# Patient Record
Sex: FEMALE | Race: BLACK OR AFRICAN AMERICAN | NOT HISPANIC OR LATINO | Employment: FULL TIME | URBAN - METROPOLITAN AREA
[De-identification: names, ages, dates, MRNs, and addresses within clinical notes are randomized per-mention and may not be internally consistent; named-entity substitution may affect disease eponyms.]

---

## 2015-07-15 LAB — HPV MRNA E6/E7: NOT DETECTED

## 2020-06-24 ENCOUNTER — LAB VISIT (OUTPATIENT)
Dept: LAB | Facility: OTHER | Age: 45
End: 2020-06-24
Payer: COMMERCIAL

## 2020-06-24 DIAGNOSIS — Z20.822 SUSPECTED COVID-19 VIRUS INFECTION: ICD-10-CM

## 2020-06-24 PROCEDURE — U0003 INFECTIOUS AGENT DETECTION BY NUCLEIC ACID (DNA OR RNA); SEVERE ACUTE RESPIRATORY SYNDROME CORONAVIRUS 2 (SARS-COV-2) (CORONAVIRUS DISEASE [COVID-19]), AMPLIFIED PROBE TECHNIQUE, MAKING USE OF HIGH THROUGHPUT TECHNOLOGIES AS DESCRIBED BY CMS-2020-01-R: HCPCS

## 2020-06-27 LAB — SARS-COV-2 RNA RESP QL NAA+PROBE: NOT DETECTED

## 2020-07-01 ENCOUNTER — LAB VISIT (OUTPATIENT)
Dept: LAB | Facility: OTHER | Age: 45
End: 2020-07-01
Attending: INTERNAL MEDICINE
Payer: COMMERCIAL

## 2020-07-01 DIAGNOSIS — Z20.822 SUSPECTED COVID-19 VIRUS INFECTION: ICD-10-CM

## 2020-07-01 PROCEDURE — U0003 INFECTIOUS AGENT DETECTION BY NUCLEIC ACID (DNA OR RNA); SEVERE ACUTE RESPIRATORY SYNDROME CORONAVIRUS 2 (SARS-COV-2) (CORONAVIRUS DISEASE [COVID-19]), AMPLIFIED PROBE TECHNIQUE, MAKING USE OF HIGH THROUGHPUT TECHNOLOGIES AS DESCRIBED BY CMS-2020-01-R: HCPCS

## 2020-07-05 LAB — SARS-COV-2 RNA RESP QL NAA+PROBE: NOT DETECTED

## 2020-07-08 ENCOUNTER — LAB VISIT (OUTPATIENT)
Dept: LAB | Facility: OTHER | Age: 45
End: 2020-07-08
Payer: COMMERCIAL

## 2020-07-08 DIAGNOSIS — Z20.822 SUSPECTED COVID-19 VIRUS INFECTION: ICD-10-CM

## 2020-07-08 PROCEDURE — U0003 INFECTIOUS AGENT DETECTION BY NUCLEIC ACID (DNA OR RNA); SEVERE ACUTE RESPIRATORY SYNDROME CORONAVIRUS 2 (SARS-COV-2) (CORONAVIRUS DISEASE [COVID-19]), AMPLIFIED PROBE TECHNIQUE, MAKING USE OF HIGH THROUGHPUT TECHNOLOGIES AS DESCRIBED BY CMS-2020-01-R: HCPCS | Mod: ST72

## 2020-07-12 LAB — SARS-COV-2 RNA RESP QL NAA+PROBE: NEGATIVE

## 2020-07-15 ENCOUNTER — LAB VISIT (OUTPATIENT)
Dept: LAB | Facility: OTHER | Age: 45
End: 2020-07-15
Payer: COMMERCIAL

## 2020-07-15 DIAGNOSIS — Z03.818 ENCOUNTER FOR OBSERVATION FOR SUSPECTED EXPOSURE TO OTHER BIOLOGICAL AGENTS RULED OUT: ICD-10-CM

## 2020-07-15 DIAGNOSIS — Z20.822 SUSPECTED COVID-19 VIRUS INFECTION: ICD-10-CM

## 2020-07-15 PROCEDURE — U0003 INFECTIOUS AGENT DETECTION BY NUCLEIC ACID (DNA OR RNA); SEVERE ACUTE RESPIRATORY SYNDROME CORONAVIRUS 2 (SARS-COV-2) (CORONAVIRUS DISEASE [COVID-19]), AMPLIFIED PROBE TECHNIQUE, MAKING USE OF HIGH THROUGHPUT TECHNOLOGIES AS DESCRIBED BY CMS-2020-01-R: HCPCS

## 2020-07-19 LAB — SARS-COV-2 RNA RESP QL NAA+PROBE: NEGATIVE

## 2020-07-22 ENCOUNTER — LAB VISIT (OUTPATIENT)
Dept: LAB | Facility: OTHER | Age: 45
End: 2020-07-22
Payer: COMMERCIAL

## 2020-07-22 DIAGNOSIS — Z20.822 SUSPECTED COVID-19 VIRUS INFECTION: ICD-10-CM

## 2020-07-22 DIAGNOSIS — Z03.818 ENCOUNTER FOR OBSERVATION FOR SUSPECTED EXPOSURE TO OTHER BIOLOGICAL AGENTS RULED OUT: ICD-10-CM

## 2020-07-22 PROCEDURE — U0003 INFECTIOUS AGENT DETECTION BY NUCLEIC ACID (DNA OR RNA); SEVERE ACUTE RESPIRATORY SYNDROME CORONAVIRUS 2 (SARS-COV-2) (CORONAVIRUS DISEASE [COVID-19]), AMPLIFIED PROBE TECHNIQUE, MAKING USE OF HIGH THROUGHPUT TECHNOLOGIES AS DESCRIBED BY CMS-2020-01-R: HCPCS

## 2020-07-22 RX ORDER — OMEPRAZOLE 20 MG/1
TABLET, DELAYED RELEASE ORAL
COMMUNITY
Start: 2019-01-01 | End: 2020-07-27

## 2020-07-22 NOTE — PROGRESS NOTES
[facility] Note    Chief Complaint      Chief Complaint   Patient presents with    [facility]    Annual Exam       History of Present Illness      Ilene Garber is a 44 y.o. AAF with Dysmnenorrhea, RT Rotator cuff tear presents to est PCP and for well visit.    Dysmenorrhea - Takes Ibuprofen prn abd cramping.     RT shoulder pain - She was injured at work. She tore her rotator cuff. Takes Ibuprofen prn.     Mild intermittent Asthma - Allergies are a trigger.  Only needs Proair 5 times/yr.    Obesity - BMI - 30.3 - Rec exercise every other day. Rec low carb diet.    HCM - Flu - ;  Tdap - ?;  PCV 13 - ?;  PVX 23 - ?;  Shingrix - due at 52 y.o.;  MGM - due; gets from OB;  PAP -  - wnl per pt;  HIV Screen - utd - neg per pt; C-scope - none - due at 52 y.o.; Prev PCP - Dr. Rory Cooper; Ob/GYN - Dr. Dey    Past Medical History:  Past Medical History:   Diagnosis Date    Asthma     GERD (gastroesophageal reflux disease)     Hypertension     Ovarian cyst     Rotator cuff tear, right        Past Surgical History:   has a past surgical history that includes  section; LAPAROSCOPY LYSIS OF ADHESIONS (2018); and Bilateral tubal ligation.    Family History:  family history includes COPD in her mother; Coronary artery disease in her mother; Diabetes in her maternal grandmother; Hypertension in her maternal grandmother.     Social History:  Social History     Tobacco Use    Smoking status: Never Smoker    Smokeless tobacco: Never Used   Substance Use Topics    Alcohol use: Yes     Drinks per session: 1 or 2     Comment: on rare occasion    Drug use: Never       Review of Systems   Constitutional: Negative for chills, diaphoresis and fever.   HENT: Positive for sore throat. Negative for hearing loss and tinnitus.    Eyes: Negative for blurred vision and double vision.   Respiratory: Negative for cough and shortness of breath.    Cardiovascular: Negative for chest pain and  palpitations.   Gastrointestinal: Positive for heartburn. Negative for abdominal pain, blood in stool, constipation, diarrhea, nausea and vomiting.   Genitourinary: Negative for dysuria and frequency.   Musculoskeletal: Positive for joint pain.        RT shoulder   Skin: Negative for itching and rash.   Neurological: Positive for headaches. Negative for dizziness.   Endo/Heme/Allergies: Bruises/bleeds easily.   Psychiatric/Behavioral: Negative for depression. The patient is not nervous/anxious.         Medications:  Outpatient Encounter Medications as of 7/23/2020   Medication Sig Dispense Refill    albuterol (PROVENTIL) 5 mg/mL nebulizer solution Inhale 2 puffs into the lungs.      fluticasone propionate (FLONASE) 50 mcg/actuation nasal spray 1 spray by Nasal route.      hydroCHLOROthiazide (HYDRODIURIL) 25 MG tablet Take 1 tablet (25 mg total) by mouth once daily. 90 tablet 3    ibuprofen (ADVIL,MOTRIN) 800 MG tablet Take 800 mg by mouth.      omeprazole (PRILOSEC OTC) 20 MG tablet       [DISCONTINUED] hydroCHLOROthiazide (HYDRODIURIL) 25 MG tablet TK 1 T PO D      [DISCONTINUED] ibuprofen (ADVIL,MOTRIN) 200 MG tablet Take 800 mg by mouth.      albuterol (PROAIR HFA) 90 mcg/actuation inhaler Inhale 2 puffs into the lungs every 6 (six) hours as needed for Wheezing. Rescue 18 g 3     No facility-administered encounter medications on file as of 7/23/2020.        Current Outpatient Medications:     albuterol (PROVENTIL) 5 mg/mL nebulizer solution, Inhale 2 puffs into the lungs., Disp: , Rfl:     fluticasone propionate (FLONASE) 50 mcg/actuation nasal spray, 1 spray by Nasal route., Disp: , Rfl:     hydroCHLOROthiazide (HYDRODIURIL) 25 MG tablet, Take 1 tablet (25 mg total) by mouth once daily., Disp: 90 tablet, Rfl: 3    ibuprofen (ADVIL,MOTRIN) 800 MG tablet, Take 800 mg by mouth., Disp: , Rfl:     omeprazole (PRILOSEC OTC) 20 MG tablet, , Disp: , Rfl:     albuterol (PROAIR HFA) 90 mcg/actuation  "inhaler, Inhale 2 puffs into the lungs every 6 (six) hours as needed for Wheezing. Rescue, Disp: 18 g, Rfl: 3    Allergies:  Review of patient's allergies indicates:  No Known Allergies    Health Maintenance:    There is no immunization history on file for this patient.   Health Maintenance   Topic Date Due    Hepatitis C Screening  1975    Lipid Panel  1975    TETANUS VACCINE  08/22/1995    Mammogram  08/22/2017      Objective:      Vital Signs  Temp: 97.8 °F (36.6 °C)  Pulse: 71  Resp: 19  SpO2: 99 %  BP: 123/74  BP Location: Right arm  Patient Position: Sitting  Pain Score: 0-No pain  Height and Weight  Height: 5' 7" (170.2 cm)  Weight: 87.9 kg (193 lb 12.6 oz)  BSA (Calculated - sq m): 2.04 sq meters  BMI (Calculated): 30.3  Weight in (lb) to have BMI = 25: 159.3]    Laboratory:    Physical Exam  Vitals signs reviewed.   Constitutional:       Appearance: Normal appearance.   HENT:      Head: Normocephalic and atraumatic.      Right Ear: Tympanic membrane normal.      Left Ear: Tympanic membrane normal.   Eyes:      Extraocular Movements: Extraocular movements intact.      Conjunctiva/sclera: Conjunctivae normal.      Pupils: Pupils are equal, round, and reactive to light.   Neck:      Musculoskeletal: Normal range of motion and neck supple.   Cardiovascular:      Rate and Rhythm: Normal rate and regular rhythm.      Pulses: Normal pulses.      Heart sounds: Normal heart sounds.   Pulmonary:      Effort: Pulmonary effort is normal. No respiratory distress.      Breath sounds: Normal breath sounds. No wheezing or rhonchi.   Abdominal:      General: Bowel sounds are normal. There is no distension.      Palpations: Abdomen is soft.      Tenderness: There is no abdominal tenderness. There is no guarding or rebound.   Musculoskeletal:      Comments: Dec ROM of RT shoulder   Skin:     General: Skin is warm and dry.   Neurological:      General: No focal deficit present.      Mental Status: She is alert " and oriented to person, place, and time.   Psychiatric:         Mood and Affect: Mood normal.         Behavior: Behavior normal.             Assessment:       1. Normal physical exam, routine    2. Screening for lipoid disorders    3. Hypertension, unspecified type    4. Gastroesophageal reflux disease, esophagitis presence not specified    5. Mild intermittent asthma without complication    6. Class 1 obesity due to excess calories with serious comorbidity and body mass index (BMI) of 30.0 to 30.9 in adult        Ilene Garber is a 44 y.o.female with:    1. Normal physical exam, routine  - CBC auto differential; Future  - Comprehensive metabolic panel; Future  - T4, free; Future  - TSH; Future  - Performed today.  Will check Basic labs.  RTC in 1 yr for fu or sooner if needed. Will obtain old records for review    2. Screening for lipoid disorders  - Lipid Panel; Future    3. Hypertension, unspecified type  - hydroCHLOROthiazide (HYDRODIURIL) 25 MG tablet; Take 1 tablet (25 mg total) by mouth once daily.  Dispense: 90 tablet; Refill: 3  - Stable.  Cont current regimen.    4. Gastroesophageal reflux disease, esophagitis presence not specified  - Stable.  Cont current regimen.    5. Mild intermittent asthma without complication  - albuterol (PROAIR HFA) 90 mcg/actuation inhaler; Inhale 2 puffs into the lungs every 6 (six) hours as needed for Wheezing. Rescue  Dispense: 18 g; Refill: 3  - Stable.  Cont current regimen.    6. Class 1 obesity due to excess calories with serious comorbidity and body mass index (BMI) of 30.0 to 30.9 in adult  -Rec exercise to goal of 150 min/wk and low carb diet.     Chronic conditions status updated as per HPI.  Other than changes above, cont current medications and maintain follow up with specialists.  Return to clinic in 12 months for well visit or sooner if needed.      Uma Henry MD  [facility]

## 2020-07-23 ENCOUNTER — OFFICE VISIT (OUTPATIENT)
Dept: PRIMARY CARE CLINIC | Facility: CLINIC | Age: 45
End: 2020-07-23
Payer: COMMERCIAL

## 2020-07-23 VITALS
DIASTOLIC BLOOD PRESSURE: 74 MMHG | OXYGEN SATURATION: 99 % | SYSTOLIC BLOOD PRESSURE: 123 MMHG | RESPIRATION RATE: 19 BRPM | TEMPERATURE: 98 F | WEIGHT: 193.81 LBS | BODY MASS INDEX: 30.42 KG/M2 | HEIGHT: 67 IN | HEART RATE: 71 BPM

## 2020-07-23 DIAGNOSIS — K21.9 GASTROESOPHAGEAL REFLUX DISEASE, ESOPHAGITIS PRESENCE NOT SPECIFIED: ICD-10-CM

## 2020-07-23 DIAGNOSIS — E66.09 CLASS 1 OBESITY DUE TO EXCESS CALORIES WITH SERIOUS COMORBIDITY AND BODY MASS INDEX (BMI) OF 30.0 TO 30.9 IN ADULT: ICD-10-CM

## 2020-07-23 DIAGNOSIS — Z13.220 SCREENING FOR LIPOID DISORDERS: ICD-10-CM

## 2020-07-23 DIAGNOSIS — J45.20 MILD INTERMITTENT ASTHMA WITHOUT COMPLICATION: ICD-10-CM

## 2020-07-23 DIAGNOSIS — Z00.00 NORMAL PHYSICAL EXAM, ROUTINE: Primary | ICD-10-CM

## 2020-07-23 DIAGNOSIS — I10 HYPERTENSION, UNSPECIFIED TYPE: ICD-10-CM

## 2020-07-23 PROBLEM — E66.811 CLASS 1 OBESITY DUE TO EXCESS CALORIES WITH SERIOUS COMORBIDITY AND BODY MASS INDEX (BMI) OF 30.0 TO 30.9 IN ADULT: Status: ACTIVE | Noted: 2020-07-23

## 2020-07-23 PROCEDURE — 99999 PR PBB SHADOW E&M-EST. PATIENT-LVL IV: CPT | Mod: PBBFAC,,, | Performed by: INTERNAL MEDICINE

## 2020-07-23 PROCEDURE — 3008F BODY MASS INDEX DOCD: CPT | Mod: CPTII,S$GLB,, | Performed by: INTERNAL MEDICINE

## 2020-07-23 PROCEDURE — 99999 PR PBB SHADOW E&M-EST. PATIENT-LVL IV: ICD-10-PCS | Mod: PBBFAC,,, | Performed by: INTERNAL MEDICINE

## 2020-07-23 PROCEDURE — 99386 PREV VISIT NEW AGE 40-64: CPT | Mod: S$GLB,,, | Performed by: INTERNAL MEDICINE

## 2020-07-23 PROCEDURE — 3008F PR BODY MASS INDEX (BMI) DOCUMENTED: ICD-10-PCS | Mod: CPTII,S$GLB,, | Performed by: INTERNAL MEDICINE

## 2020-07-23 PROCEDURE — 99386 PR PREVENTIVE VISIT,NEW,40-64: ICD-10-PCS | Mod: S$GLB,,, | Performed by: INTERNAL MEDICINE

## 2020-07-23 RX ORDER — FLUTICASONE PROPIONATE 50 MCG
1 SPRAY, SUSPENSION (ML) NASAL
COMMUNITY
End: 2021-11-17 | Stop reason: SDUPTHER

## 2020-07-23 RX ORDER — IBUPROFEN 200 MG
800 TABLET ORAL
COMMUNITY
End: 2020-07-23

## 2020-07-23 RX ORDER — HYDROCHLOROTHIAZIDE 25 MG/1
TABLET ORAL
COMMUNITY
Start: 2020-05-22 | End: 2020-07-23 | Stop reason: SDUPTHER

## 2020-07-23 RX ORDER — HYDROCHLOROTHIAZIDE 25 MG/1
25 TABLET ORAL DAILY
Qty: 90 TABLET | Refills: 3 | Status: SHIPPED | OUTPATIENT
Start: 2020-07-23 | End: 2020-08-24

## 2020-07-23 RX ORDER — ALBUTEROL SULFATE 5 MG/ML
2 SOLUTION RESPIRATORY (INHALATION) EVERY 4 HOURS PRN
COMMUNITY

## 2020-07-23 RX ORDER — ALBUTEROL SULFATE 90 UG/1
2 AEROSOL, METERED RESPIRATORY (INHALATION) EVERY 6 HOURS PRN
Qty: 18 G | Refills: 3 | Status: SHIPPED | OUTPATIENT
Start: 2020-07-23 | End: 2021-07-23

## 2020-07-23 RX ORDER — IBUPROFEN 800 MG/1
800 TABLET ORAL
COMMUNITY
Start: 2018-11-28 | End: 2021-11-05

## 2020-07-24 ENCOUNTER — LAB VISIT (OUTPATIENT)
Dept: LAB | Facility: HOSPITAL | Age: 45
End: 2020-07-24
Attending: INTERNAL MEDICINE
Payer: COMMERCIAL

## 2020-07-24 ENCOUNTER — PATIENT OUTREACH (OUTPATIENT)
Dept: ADMINISTRATIVE | Facility: HOSPITAL | Age: 45
End: 2020-07-24

## 2020-07-24 DIAGNOSIS — Z00.00 NORMAL PHYSICAL EXAM, ROUTINE: ICD-10-CM

## 2020-07-24 DIAGNOSIS — Z13.220 SCREENING FOR LIPOID DISORDERS: ICD-10-CM

## 2020-07-24 LAB
ALBUMIN SERPL BCP-MCNC: 3.8 G/DL (ref 3.5–5.2)
ALP SERPL-CCNC: 55 U/L (ref 55–135)
ALT SERPL W/O P-5'-P-CCNC: 11 U/L (ref 10–44)
ANION GAP SERPL CALC-SCNC: 7 MMOL/L (ref 8–16)
AST SERPL-CCNC: 16 U/L (ref 10–40)
BASOPHILS # BLD AUTO: 0.05 K/UL (ref 0–0.2)
BASOPHILS NFR BLD: 0.9 % (ref 0–1.9)
BILIRUB SERPL-MCNC: 0.4 MG/DL (ref 0.1–1)
BUN SERPL-MCNC: 8 MG/DL (ref 6–20)
CALCIUM SERPL-MCNC: 8.9 MG/DL (ref 8.7–10.5)
CHLORIDE SERPL-SCNC: 107 MMOL/L (ref 95–110)
CHOLEST SERPL-MCNC: 137 MG/DL (ref 120–199)
CHOLEST/HDLC SERPL: 3.5 {RATIO} (ref 2–5)
CO2 SERPL-SCNC: 26 MMOL/L (ref 23–29)
CREAT SERPL-MCNC: 0.9 MG/DL (ref 0.5–1.4)
DIFFERENTIAL METHOD: ABNORMAL
EOSINOPHIL # BLD AUTO: 0.1 K/UL (ref 0–0.5)
EOSINOPHIL NFR BLD: 1.2 % (ref 0–8)
ERYTHROCYTE [DISTWIDTH] IN BLOOD BY AUTOMATED COUNT: 14.6 % (ref 11.5–14.5)
EST. GFR  (AFRICAN AMERICAN): >60 ML/MIN/1.73 M^2
EST. GFR  (NON AFRICAN AMERICAN): >60 ML/MIN/1.73 M^2
GLUCOSE SERPL-MCNC: 86 MG/DL (ref 70–110)
HCT VFR BLD AUTO: 38.8 % (ref 37–48.5)
HDLC SERPL-MCNC: 39 MG/DL (ref 40–75)
HDLC SERPL: 28.5 % (ref 20–50)
HGB BLD-MCNC: 11.7 G/DL (ref 12–16)
IMM GRANULOCYTES # BLD AUTO: 0.01 K/UL (ref 0–0.04)
IMM GRANULOCYTES NFR BLD AUTO: 0.2 % (ref 0–0.5)
LDLC SERPL CALC-MCNC: 81.6 MG/DL (ref 63–159)
LYMPHOCYTES # BLD AUTO: 2.2 K/UL (ref 1–4.8)
LYMPHOCYTES NFR BLD: 39.2 % (ref 18–48)
MCH RBC QN AUTO: 27.8 PG (ref 27–31)
MCHC RBC AUTO-ENTMCNC: 30.2 G/DL (ref 32–36)
MCV RBC AUTO: 92 FL (ref 82–98)
MONOCYTES # BLD AUTO: 0.4 K/UL (ref 0.3–1)
MONOCYTES NFR BLD: 7.7 % (ref 4–15)
NEUTROPHILS # BLD AUTO: 2.9 K/UL (ref 1.8–7.7)
NEUTROPHILS NFR BLD: 50.8 % (ref 38–73)
NONHDLC SERPL-MCNC: 98 MG/DL
NRBC BLD-RTO: 0 /100 WBC
PLATELET # BLD AUTO: 282 K/UL (ref 150–350)
PMV BLD AUTO: 11.6 FL (ref 9.2–12.9)
POTASSIUM SERPL-SCNC: 3.4 MMOL/L (ref 3.5–5.1)
PROT SERPL-MCNC: 7.3 G/DL (ref 6–8.4)
RBC # BLD AUTO: 4.21 M/UL (ref 4–5.4)
SODIUM SERPL-SCNC: 140 MMOL/L (ref 136–145)
T4 FREE SERPL-MCNC: 1.06 NG/DL (ref 0.71–1.51)
TRIGL SERPL-MCNC: 82 MG/DL (ref 30–150)
TSH SERPL DL<=0.005 MIU/L-ACNC: 1.24 UIU/ML (ref 0.4–4)
WBC # BLD AUTO: 5.71 K/UL (ref 3.9–12.7)

## 2020-07-24 PROCEDURE — 85025 COMPLETE CBC W/AUTO DIFF WBC: CPT

## 2020-07-24 PROCEDURE — 84439 ASSAY OF FREE THYROXINE: CPT

## 2020-07-24 PROCEDURE — 84443 ASSAY THYROID STIM HORMONE: CPT

## 2020-07-24 PROCEDURE — 80053 COMPREHEN METABOLIC PANEL: CPT

## 2020-07-24 PROCEDURE — 80061 LIPID PANEL: CPT

## 2020-07-24 PROCEDURE — 36415 COLL VENOUS BLD VENIPUNCTURE: CPT | Mod: PN

## 2020-07-24 NOTE — LETTER
AUTHORIZATION FOR RELEASE OF   CONFIDENTIAL INFORMATION    Dear Dr. Dey,    We are seeing Ilene Garber, date of birth 1975, in the clinic at [facility]. Uma Henry MD is the patient's PCP. Ilene Garber has an outstanding lab/procedure at the time we reviewed her chart. In order to help keep her health information updated, she has authorized us to request the following medical record(s):        ( X )  MAMMOGRAM                                   (  )  COLONOSCOPY      ( X )  PAP SMEAR                                       (  )  OUTSIDE LAB RESULTS     (  )  DEXA SCAN                                          (  )  EYE EXAM            (  )  FOOT EXAM                                          (  )  ENTIRE RECORD     (  )  OUTSIDE IMMUNIZATIONS                 (  )  _______________         Please fax records to [facility], Nicole Giambrone, MD, [phone]     If you have any questions, please contact Ayala at [phone].           Patient Name: Ilene Garber  : 1975  Patient Phone #: [phone]

## 2020-07-27 ENCOUNTER — TELEPHONE (OUTPATIENT)
Dept: PRIMARY CARE CLINIC | Facility: CLINIC | Age: 45
End: 2020-07-27

## 2020-07-27 DIAGNOSIS — E87.6 HYPOKALEMIA: Primary | ICD-10-CM

## 2020-07-27 DIAGNOSIS — K21.9 GASTROESOPHAGEAL REFLUX DISEASE, ESOPHAGITIS PRESENCE NOT SPECIFIED: ICD-10-CM

## 2020-07-27 LAB — SARS-COV-2 RNA RESP QL NAA+PROBE: NEGATIVE

## 2020-07-27 RX ORDER — PANTOPRAZOLE SODIUM 40 MG/1
40 TABLET, DELAYED RELEASE ORAL DAILY
Qty: 30 TABLET | Refills: 11 | Status: SHIPPED | OUTPATIENT
Start: 2020-07-27 | End: 2021-08-16

## 2020-07-27 NOTE — PROGRESS NOTES
I reviewed pt's labs. Kevin inform pt -  Her Thyroid functions are normal.  Her Cholesterol looks ok but her HDL (good Cholesterol) is low.  I recommend exercise to increase this. Her kidney and liver functions look good.  Her potassium is just below normal at 3.4.  I rec she follow a high potassium diet (ie add bananas to diet).  Can repeat in 4 wks.  Delvin eput in order for me to sign. Her blood counts are low normal - is she having heavy cycles?    Dr. CONTRERAS

## 2020-07-27 NOTE — TELEPHONE ENCOUNTER
I went over lab results with pt. She expressed understanding. Pt reports heavy cycles. Please sign pended orders so I can link them to her appt.    Pt c/o Gerd. She is taking Prilosec 40mg  but not helping. She would like to know what you rec she take instead

## 2020-07-27 NOTE — TELEPHONE ENCOUNTER
----- Message from Uma Henry MD sent at 7/27/2020  8:06 AM CDT -----  I reviewed pt's labs. Kevin inform pt -  Her Thyroid functions are normal.  Her Cholesterol looks ok but her HDL (good Cholesterol) is low.  I recommend exercise to increase this. Her kidney and liver functions look good.  Her potassium is just below normal at 3.4.  I rec she follow a high potassium diet (ie add bananas to diet).  Can repeat in 4 wks.  Delvin mix in order for me to sign. Her blood counts are low normal - is she having heavy cycles?    Dr. CONTRERAS

## 2020-07-29 ENCOUNTER — LAB VISIT (OUTPATIENT)
Dept: LAB | Facility: OTHER | Age: 45
End: 2020-07-29
Payer: COMMERCIAL

## 2020-07-29 DIAGNOSIS — Z20.822 SUSPECTED COVID-19 VIRUS INFECTION: ICD-10-CM

## 2020-07-29 DIAGNOSIS — Z03.818 ENCOUNTER FOR OBSERVATION FOR SUSPECTED EXPOSURE TO OTHER BIOLOGICAL AGENTS RULED OUT: ICD-10-CM

## 2020-07-29 PROCEDURE — U0003 INFECTIOUS AGENT DETECTION BY NUCLEIC ACID (DNA OR RNA); SEVERE ACUTE RESPIRATORY SYNDROME CORONAVIRUS 2 (SARS-COV-2) (CORONAVIRUS DISEASE [COVID-19]), AMPLIFIED PROBE TECHNIQUE, MAKING USE OF HIGH THROUGHPUT TECHNOLOGIES AS DESCRIBED BY CMS-2020-01-R: HCPCS

## 2020-07-31 ENCOUNTER — PATIENT OUTREACH (OUTPATIENT)
Dept: PRIMARY CARE CLINIC | Facility: CLINIC | Age: 45
End: 2020-07-31

## 2020-08-03 LAB — SARS-COV-2 RNA RESP QL NAA+PROBE: NEGATIVE

## 2020-08-05 ENCOUNTER — LAB VISIT (OUTPATIENT)
Dept: LAB | Facility: OTHER | Age: 45
End: 2020-08-05
Payer: COMMERCIAL

## 2020-08-05 DIAGNOSIS — Z03.818 ENCOUNTER FOR OBSERVATION FOR SUSPECTED EXPOSURE TO OTHER BIOLOGICAL AGENTS RULED OUT: ICD-10-CM

## 2020-08-05 DIAGNOSIS — Z20.822 SUSPECTED COVID-19 VIRUS INFECTION: ICD-10-CM

## 2020-08-05 PROCEDURE — U0003 INFECTIOUS AGENT DETECTION BY NUCLEIC ACID (DNA OR RNA); SEVERE ACUTE RESPIRATORY SYNDROME CORONAVIRUS 2 (SARS-COV-2) (CORONAVIRUS DISEASE [COVID-19]), AMPLIFIED PROBE TECHNIQUE, MAKING USE OF HIGH THROUGHPUT TECHNOLOGIES AS DESCRIBED BY CMS-2020-01-R: HCPCS

## 2020-08-08 LAB — SARS-COV-2 RNA RESP QL NAA+PROBE: NORMAL

## 2020-08-12 ENCOUNTER — LAB VISIT (OUTPATIENT)
Dept: LAB | Facility: OTHER | Age: 45
End: 2020-08-12
Attending: INTERNAL MEDICINE
Payer: COMMERCIAL

## 2020-08-12 DIAGNOSIS — Z03.818 ENCOUNTER FOR OBSERVATION FOR SUSPECTED EXPOSURE TO OTHER BIOLOGICAL AGENTS RULED OUT: ICD-10-CM

## 2020-08-12 PROCEDURE — U0003 INFECTIOUS AGENT DETECTION BY NUCLEIC ACID (DNA OR RNA); SEVERE ACUTE RESPIRATORY SYNDROME CORONAVIRUS 2 (SARS-COV-2) (CORONAVIRUS DISEASE [COVID-19]), AMPLIFIED PROBE TECHNIQUE, MAKING USE OF HIGH THROUGHPUT TECHNOLOGIES AS DESCRIBED BY CMS-2020-01-R: HCPCS

## 2020-08-13 LAB — SARS-COV-2 RNA RESP QL NAA+PROBE: NOT DETECTED

## 2020-08-19 ENCOUNTER — LAB VISIT (OUTPATIENT)
Dept: LAB | Facility: OTHER | Age: 45
End: 2020-08-19
Payer: COMMERCIAL

## 2020-08-19 DIAGNOSIS — Z03.818 ENCOUNTER FOR OBSERVATION FOR SUSPECTED EXPOSURE TO OTHER BIOLOGICAL AGENTS RULED OUT: ICD-10-CM

## 2020-08-19 PROCEDURE — U0003 INFECTIOUS AGENT DETECTION BY NUCLEIC ACID (DNA OR RNA); SEVERE ACUTE RESPIRATORY SYNDROME CORONAVIRUS 2 (SARS-COV-2) (CORONAVIRUS DISEASE [COVID-19]), AMPLIFIED PROBE TECHNIQUE, MAKING USE OF HIGH THROUGHPUT TECHNOLOGIES AS DESCRIBED BY CMS-2020-01-R: HCPCS

## 2020-08-20 LAB — SARS-COV-2 RNA RESP QL NAA+PROBE: NOT DETECTED

## 2020-08-24 ENCOUNTER — LAB VISIT (OUTPATIENT)
Dept: LAB | Facility: HOSPITAL | Age: 45
End: 2020-08-24
Attending: INTERNAL MEDICINE
Payer: COMMERCIAL

## 2020-08-24 DIAGNOSIS — A60.00 GENITAL HERPES SIMPLEX, UNSPECIFIED SITE: ICD-10-CM

## 2020-08-24 DIAGNOSIS — I10 HYPERTENSION, UNSPECIFIED TYPE: Primary | ICD-10-CM

## 2020-08-24 DIAGNOSIS — E87.6 HYPOKALEMIA: ICD-10-CM

## 2020-08-24 LAB — POTASSIUM SERPL-SCNC: 2.9 MMOL/L (ref 3.5–5.1)

## 2020-08-24 PROCEDURE — 84132 ASSAY OF SERUM POTASSIUM: CPT

## 2020-08-24 PROCEDURE — 36415 COLL VENOUS BLD VENIPUNCTURE: CPT | Mod: PN

## 2020-08-24 RX ORDER — SPIRONOLACTONE 25 MG/1
25 TABLET ORAL DAILY
Qty: 30 TABLET | Refills: 0 | Status: SHIPPED | OUTPATIENT
Start: 2020-08-24 | End: 2020-09-26 | Stop reason: SDUPTHER

## 2020-08-24 RX ORDER — VALACYCLOVIR HYDROCHLORIDE 500 MG/1
TABLET, FILM COATED ORAL
Qty: 60 TABLET | Refills: 0 | Status: SHIPPED | OUTPATIENT
Start: 2020-08-24 | End: 2021-11-05

## 2020-08-24 RX ORDER — POTASSIUM CHLORIDE 20 MEQ/1
TABLET, EXTENDED RELEASE ORAL
Qty: 2 TABLET | Refills: 0 | Status: SHIPPED | OUTPATIENT
Start: 2020-08-24 | End: 2021-11-05

## 2020-08-24 NOTE — PROGRESS NOTES
Id/w pt-  I reviewed your labs and you potassium was low at 2.9.  I will send you Potassium to take x 2 doses.  I would like to change your HCTZ 25 mg/d to spironolactone 25 mg/d. She does reports BLE edema. RTC in 4 wks. Repeat BMP in 2 wks. She also asked for a Valtrex Rx     Dr. CONTRERAS

## 2020-08-25 ENCOUNTER — TELEPHONE (OUTPATIENT)
Dept: PRIMARY CARE CLINIC | Facility: CLINIC | Age: 45
End: 2020-08-25

## 2020-08-25 NOTE — TELEPHONE ENCOUNTER
----- Message from Uma Henry MD sent at 8/24/2020  5:07 PM CDT -----  Id/w pt-  I reviewed your labs and you potassium was low at 2.9.  I will send you Potassium to take x 2 doses.  I would like to change your HCTZ 25 mg/d to spironolactone 25 mg/d. She does reports BLE edema. RTC in 4 wks. Repeat BMP in 2 wks. She also asked for a Valtrex Rx     Dr. CONTRERAS

## 2020-08-26 ENCOUNTER — LAB VISIT (OUTPATIENT)
Dept: LAB | Facility: OTHER | Age: 45
End: 2020-08-26
Payer: COMMERCIAL

## 2020-08-26 DIAGNOSIS — Z03.818 ENCOUNTER FOR OBSERVATION FOR SUSPECTED EXPOSURE TO OTHER BIOLOGICAL AGENTS RULED OUT: ICD-10-CM

## 2020-08-26 LAB — SARS-COV-2 RNA RESP QL NAA+PROBE: NOT DETECTED

## 2020-08-26 PROCEDURE — U0003 INFECTIOUS AGENT DETECTION BY NUCLEIC ACID (DNA OR RNA); SEVERE ACUTE RESPIRATORY SYNDROME CORONAVIRUS 2 (SARS-COV-2) (CORONAVIRUS DISEASE [COVID-19]), AMPLIFIED PROBE TECHNIQUE, MAKING USE OF HIGH THROUGHPUT TECHNOLOGIES AS DESCRIBED BY CMS-2020-01-R: HCPCS

## 2020-09-02 ENCOUNTER — LAB VISIT (OUTPATIENT)
Dept: LAB | Facility: OTHER | Age: 45
End: 2020-09-02
Payer: COMMERCIAL

## 2020-09-02 DIAGNOSIS — Z03.818 ENCOUNTER FOR OBSERVATION FOR SUSPECTED EXPOSURE TO OTHER BIOLOGICAL AGENTS RULED OUT: ICD-10-CM

## 2020-09-02 PROCEDURE — U0003 INFECTIOUS AGENT DETECTION BY NUCLEIC ACID (DNA OR RNA); SEVERE ACUTE RESPIRATORY SYNDROME CORONAVIRUS 2 (SARS-COV-2) (CORONAVIRUS DISEASE [COVID-19]), AMPLIFIED PROBE TECHNIQUE, MAKING USE OF HIGH THROUGHPUT TECHNOLOGIES AS DESCRIBED BY CMS-2020-01-R: HCPCS

## 2020-09-03 LAB — SARS-COV-2 RNA RESP QL NAA+PROBE: NOT DETECTED

## 2020-09-09 ENCOUNTER — PATIENT OUTREACH (OUTPATIENT)
Dept: ADMINISTRATIVE | Facility: HOSPITAL | Age: 45
End: 2020-09-09

## 2020-09-09 NOTE — LETTER
AUTHORIZATION FOR RELEASE OF   CONFIDENTIAL INFORMATION    Dear Dr. Dey,    We are seeing Ilene Garber, date of birth 1975, in the clinic at [facility]. Uma Henry MD is the patient's PCP. Ilene Garber has an outstanding lab/procedure at the time we reviewed her chart. In order to help keep her health information updated, she has authorized us to request the following medical record(s):        ( X )  MAMMOGRAM                                      (  )  COLONOSCOPY      ( X )  PAP SMEAR                                          (  )  OUTSIDE LAB RESULTS     (  )  DEXA SCAN                                          (  )  EYE EXAM            (  )  FOOT EXAM                                          (  )  ENTIRE RECORD     (  )  OUTSIDE IMMUNIZATIONS                 (  )  _______________         Please fax records to [facility], Nicole Giambrone, MD, [phone]     If you have any questions, please contact Ayala at [phone].           Patient Name: Ilene Garber  : 1975  Patient Phone #: [phone]

## 2020-09-09 NOTE — PROGRESS NOTES
Pre-visit chart review completed.  No records in LINKS    Patient due for the following    Hepatitis C Screening     HIV Screening     TETANUS VACCINE     Mammogram     Cervical Cancer Screening     Influenza Vaccine (1)      E-faxed Dr. Dye for most recent mammogram and pap results.

## 2020-09-11 ENCOUNTER — LAB VISIT (OUTPATIENT)
Dept: LAB | Facility: HOSPITAL | Age: 45
End: 2020-09-11
Attending: INTERNAL MEDICINE
Payer: COMMERCIAL

## 2020-09-11 DIAGNOSIS — E87.6 HYPOKALEMIA: ICD-10-CM

## 2020-09-11 DIAGNOSIS — I10 HYPERTENSION, UNSPECIFIED TYPE: ICD-10-CM

## 2020-09-11 LAB
ANION GAP SERPL CALC-SCNC: 12 MMOL/L (ref 8–16)
BUN SERPL-MCNC: 9 MG/DL (ref 6–20)
CALCIUM SERPL-MCNC: 8.5 MG/DL (ref 8.7–10.5)
CHLORIDE SERPL-SCNC: 106 MMOL/L (ref 95–110)
CO2 SERPL-SCNC: 22 MMOL/L (ref 23–29)
CREAT SERPL-MCNC: 0.9 MG/DL (ref 0.5–1.4)
EST. GFR  (AFRICAN AMERICAN): >60 ML/MIN/1.73 M^2
EST. GFR  (NON AFRICAN AMERICAN): >60 ML/MIN/1.73 M^2
GLUCOSE SERPL-MCNC: 73 MG/DL (ref 70–110)
POTASSIUM SERPL-SCNC: 3.4 MMOL/L (ref 3.5–5.1)
SODIUM SERPL-SCNC: 140 MMOL/L (ref 136–145)

## 2020-09-11 PROCEDURE — 80048 BASIC METABOLIC PNL TOTAL CA: CPT

## 2020-09-11 PROCEDURE — 36415 COLL VENOUS BLD VENIPUNCTURE: CPT | Mod: PN

## 2020-09-14 NOTE — PROGRESS NOTES
I sent pt a my chart message -  I reviewed your labs. Your potassium improved from 2.9 to 3.4 which is just below normal. Your calcium is just below normal. Continue a high potassium diet. How's the swelling since we changed you to spironolactone?  I will repeat your potassium and calcium after next visit.     Dr. CONTRERAS

## 2020-09-24 ENCOUNTER — LAB VISIT (OUTPATIENT)
Dept: LAB | Facility: OTHER | Age: 45
End: 2020-09-24
Payer: COMMERCIAL

## 2020-09-24 DIAGNOSIS — Z03.818 ENCOUNTER FOR OBSERVATION FOR SUSPECTED EXPOSURE TO OTHER BIOLOGICAL AGENTS RULED OUT: ICD-10-CM

## 2020-09-24 LAB — SARS-COV-2 RNA RESP QL NAA+PROBE: NOT DETECTED

## 2020-09-24 PROCEDURE — U0003 INFECTIOUS AGENT DETECTION BY NUCLEIC ACID (DNA OR RNA); SEVERE ACUTE RESPIRATORY SYNDROME CORONAVIRUS 2 (SARS-COV-2) (CORONAVIRUS DISEASE [COVID-19]), AMPLIFIED PROBE TECHNIQUE, MAKING USE OF HIGH THROUGHPUT TECHNOLOGIES AS DESCRIBED BY CMS-2020-01-R: HCPCS

## 2020-09-26 ENCOUNTER — PATIENT MESSAGE (OUTPATIENT)
Dept: PRIMARY CARE CLINIC | Facility: CLINIC | Age: 45
End: 2020-09-26

## 2020-09-26 ENCOUNTER — TELEPHONE (OUTPATIENT)
Dept: PRIMARY CARE CLINIC | Facility: CLINIC | Age: 45
End: 2020-09-26

## 2020-09-26 DIAGNOSIS — I10 HYPERTENSION, UNSPECIFIED TYPE: ICD-10-CM

## 2020-09-26 RX ORDER — SPIRONOLACTONE 25 MG/1
25 TABLET ORAL DAILY
Qty: 30 TABLET | Refills: 0 | Status: SHIPPED | OUTPATIENT
Start: 2020-09-26 | End: 2020-11-01

## 2020-09-26 NOTE — TELEPHONE ENCOUNTER
" "Pt is [place] so I was unable to see her virtually - she'll be back on Sun. and will send a message to staff to reschedule. She needs a refill on her Aldactone. I sent it to [facility] at her request.    Dr. CONTRERAS"

## 2020-10-01 ENCOUNTER — LAB VISIT (OUTPATIENT)
Dept: LAB | Facility: OTHER | Age: 45
End: 2020-10-01
Payer: COMMERCIAL

## 2020-10-01 ENCOUNTER — OFFICE VISIT (OUTPATIENT)
Dept: PRIMARY CARE CLINIC | Facility: CLINIC | Age: 45
End: 2020-10-01
Payer: COMMERCIAL

## 2020-10-01 DIAGNOSIS — E83.51 HYPOCALCEMIA: ICD-10-CM

## 2020-10-01 DIAGNOSIS — J45.20 MILD INTERMITTENT ASTHMA WITHOUT COMPLICATION: ICD-10-CM

## 2020-10-01 DIAGNOSIS — Z03.818 ENCOUNTER FOR OBSERVATION FOR SUSPECTED EXPOSURE TO OTHER BIOLOGICAL AGENTS RULED OUT: ICD-10-CM

## 2020-10-01 DIAGNOSIS — D64.9 NORMOCYTIC ANEMIA: ICD-10-CM

## 2020-10-01 DIAGNOSIS — I10 HYPERTENSION, UNSPECIFIED TYPE: Primary | ICD-10-CM

## 2020-10-01 DIAGNOSIS — E87.6 HYPOKALEMIA: ICD-10-CM

## 2020-10-01 PROCEDURE — 99213 PR OFFICE/OUTPT VISIT, EST, LEVL III, 20-29 MIN: ICD-10-PCS | Mod: 95,,, | Performed by: INTERNAL MEDICINE

## 2020-10-01 PROCEDURE — U0003 INFECTIOUS AGENT DETECTION BY NUCLEIC ACID (DNA OR RNA); SEVERE ACUTE RESPIRATORY SYNDROME CORONAVIRUS 2 (SARS-COV-2) (CORONAVIRUS DISEASE [COVID-19]), AMPLIFIED PROBE TECHNIQUE, MAKING USE OF HIGH THROUGHPUT TECHNOLOGIES AS DESCRIBED BY CMS-2020-01-R: HCPCS

## 2020-10-01 PROCEDURE — 99213 OFFICE O/P EST LOW 20 MIN: CPT | Mod: 95,,, | Performed by: INTERNAL MEDICINE

## 2020-10-01 NOTE — PROGRESS NOTES
"[facility] Note    Chief Complaint    No chief complaint on file.      History of Present Illness      Ilene Garber is a 45 y.o. AAF with HTN, Hypokalemia, GERD, Mild Intermittent Asthma, Dysmnenorrhea, RT Rotator cuff tear presents to fu. Last visit - 7/23/20    The patient location is: "work"  The chief complaint leading to consultation is: "fu chronic issues"    Visit type: audiovisual    Face to Face time with patient: 11 minutes  11 minutes of total time spent on the encounter, which includes face to face time and non-face to face time preparing to see the patient (eg, review of tests), Obtaining and/or reviewing separately obtained history, Documenting clinical information in the electronic or other health record, Independently interpreting results (not separately reported) and communicating results to the patient/family/caregiver, or Care coordination (not separately reported).         Each patient to whom he or she provides medical services by telemedicine is:  (1) informed of the relationship between the physician and patient and the respective role of any other health care provider with respect to management of the patient; and (2) notified that he or she may decline to receive medical services by telemedicine and may withdraw from such care at any time.    Notes:     Low HDL - Her Cholesterol looks ok but her HDL (good Cholesterol) is low.  I recommend exercise to increase this.       Hypocalcemia - rec MVI.  Will reccheck level.     HSV genital - Pt n Valtrex prn.     HTN - We recently switched her from HCTZ to Spironolactone.  She is doing well. Her potassium went up from 2.9 to 3.4.  -130/70-71. Edema same and minimal. Cont high potassium diet.     GERD- Pt on Protonix.     Dysmenorrhea - Takes Ibuprofen prn abd cramping. + heavy cycles.  Improved s/p D&C. She takes OTC iron x 2 days around her cycle. Will check iron studies.      RT shoulder pain - She was injured at work. She " "tore her rotator cuff. Takes Ibuprofen prn. "It hasn't bothered me lately".      Mild intermittent Asthma - Allergies are a trigger.  Only needs Proair 5 times/yr. Wearing her K -95 mask all day bothers her. Has had to use it the past couple days. Allergies - trigger.      Obesity - BMI - 30.3 - Rec exercise every other day. Rec low carb diet.     HCM - Flu - 20 - at work;  Tdap - ?;  PCV 13 - ?;  PVX 23 - ?;  Shingrix - due at 52 y.o.;  MGM - due; gets from OB;  PAP -  - wnl per pt;  HIV Screen - utd - neg per pt; C-scope - none - due at 52 y.o.; Prev PCP - Dr. Rory Cooper; Ob/GYN - Dr. Dey    Past Medical History:  Past Medical History:   Diagnosis Date    Asthma     GERD (gastroesophageal reflux disease)     Hypertension     Ovarian cyst     Rotator cuff tear, right        Past Surgical History:   has a past surgical history that includes  section; LAPAROSCOPY LYSIS OF ADHESIONS (2018); and Bilateral tubal ligation.    Family History:  family history includes COPD in her mother; Coronary artery disease in her mother; Diabetes in her maternal grandmother; Hypertension in her maternal grandmother.     Social History:  Social History     Tobacco Use    Smoking status: Never Smoker    Smokeless tobacco: Never Used   Substance Use Topics    Alcohol use: Yes     Drinks per session: 1 or 2     Comment: on rare occasion    Drug use: Never       Review of Systems   Constitutional: Negative for malaise/fatigue.   Eyes: Negative for blurred vision.   Respiratory: Negative for shortness of breath.    Cardiovascular: Negative for chest pain, palpitations, orthopnea and PND.   Musculoskeletal: Negative for neck pain.   Neurological: Negative for headaches.        Medications:  Outpatient Encounter Medications as of 10/1/2020   Medication Sig Dispense Refill    albuterol (PROAIR HFA) 90 mcg/actuation inhaler Inhale 2 puffs into the lungs every 6 (six) hours as needed for Wheezing. Rescue 18 g " 3    albuterol (PROVENTIL) 5 mg/mL nebulizer solution Inhale 2 puffs into the lungs.      fluticasone propionate (FLONASE) 50 mcg/actuation nasal spray 1 spray by Nasal route.      ibuprofen (ADVIL,MOTRIN) 800 MG tablet Take 800 mg by mouth.      pantoprazole (PROTONIX) 40 MG tablet Take 1 tablet (40 mg total) by mouth once daily. 30 tablet 11    potassium chloride SA (K-DUR,KLOR-CON) 20 MEQ tablet 1 po BID x 2 doses 2 tablet 0    spironolactone (ALDACTONE) 25 MG tablet Take 1 tablet (25 mg total) by mouth once daily. 30 tablet 0    valACYclovir (VALTREX) 500 MG tablet 1 po BID x 3 days prn flare 60 tablet 0     No facility-administered encounter medications on file as of 10/1/2020.        Current Outpatient Medications:     albuterol (PROAIR HFA) 90 mcg/actuation inhaler, Inhale 2 puffs into the lungs every 6 (six) hours as needed for Wheezing. Rescue, Disp: 18 g, Rfl: 3    albuterol (PROVENTIL) 5 mg/mL nebulizer solution, Inhale 2 puffs into the lungs., Disp: , Rfl:     fluticasone propionate (FLONASE) 50 mcg/actuation nasal spray, 1 spray by Nasal route., Disp: , Rfl:     ibuprofen (ADVIL,MOTRIN) 800 MG tablet, Take 800 mg by mouth., Disp: , Rfl:     pantoprazole (PROTONIX) 40 MG tablet, Take 1 tablet (40 mg total) by mouth once daily., Disp: 30 tablet, Rfl: 11    potassium chloride SA (K-DUR,KLOR-CON) 20 MEQ tablet, 1 po BID x 2 doses, Disp: 2 tablet, Rfl: 0    spironolactone (ALDACTONE) 25 MG tablet, Take 1 tablet (25 mg total) by mouth once daily., Disp: 30 tablet, Rfl: 0    valACYclovir (VALTREX) 500 MG tablet, 1 po BID x 3 days prn flare, Disp: 60 tablet, Rfl: 0    Allergies:  Review of patient's allergies indicates:  No Known Allergies    Health Maintenance:    There is no immunization history on file for this patient.   Health Maintenance   Topic Date Due    Hepatitis C Screening  1975    TETANUS VACCINE  08/22/1995    Mammogram  08/22/2017    Lipid Panel  Completed      Objective:        ]    Laboratory:  CBC:  Recent Labs   Lab 07/24/20 0724   WBC 5.71   RBC 4.21   Hemoglobin 11.7 L   Hematocrit 38.8   Platelets 282   Mean Corpuscular Volume 92   Mean Corpuscular Hemoglobin 27.8   Mean Corpuscular Hemoglobin Conc 30.2 L       CMP:  Recent Labs   Lab 07/24/20  0724  09/11/20  1135   Glucose 86  --  73   Calcium 8.9  --  8.5 L   Albumin 3.8  --   --    Total Protein 7.3  --   --    Sodium 140  --  140   Potassium 3.4 L   < > 3.4 L   CO2 26  --  22 L   Chloride 107  --  106   BUN, Bld 8  --  9   Creatinine 0.9  --  0.9   eGFR if African American >60.0  --  >60.0   eGFR if non African American >60.0  --  >60.0   Alkaline Phosphatase 55  --   --    ALT 11  --   --    AST 16  --   --    Total Bilirubin 0.4  --   --     < > = values in this interval not displayed.     LIPIDS:  Recent Labs   Lab 07/24/20 0724   TSH 1.243   HDL 39 L   Cholesterol 137   Triglycerides 82   LDL Cholesterol 81.6   Hdl/Cholesterol Ratio 28.5   Non-HDL Cholesterol 98   Total Cholesterol/HDL Ratio 3.5       TSH:  Recent Labs   Lab 07/24/20 0724   TSH 1.243       Physical Exam  Constitutional:       Appearance: Normal appearance.   HENT:      Head: Normocephalic and atraumatic.   Pulmonary:      Effort: No respiratory distress.   Neurological:      General: No focal deficit present.      Mental Status: She is alert and oriented to person, place, and time.   Psychiatric:         Mood and Affect: Mood normal.         Behavior: Behavior normal.             Assessment:       1. Hypertension, unspecified type    2. Hypokalemia    3. Normocytic anemia    4. Hypocalcemia    5. Mild intermittent asthma without complication        Ilene Garber is a 45 y.o.female with:    1. Hypertension, unspecified type  - Controlled.  Cont current. Cont to monitor Potassium.     2. Hypokalemia  - Comprehensive metabolic panel; Future  -Cont current dose of Spironolactone and high potassium diet. Repeat labs in 1 month.     3. Normocytic anemia  -  Ferritin; Future  - Iron and TIBC; Future  -Check iron studies    4. Hypocalcemia  - Comprehensive metabolic panel; Future  Check Albumin and repeat Calcium with next labs in 1 month. Rec take MVI.      5. Mild intermittent asthma without complication  - Stable.  Cont current regimen.       Chronic conditions status updated as per HPI.  Other than changes above, cont current medications and maintain follow up with specialists.  Return to clinic in 3 months.    Uma Henry MD  [facility]                  Answers for HPI/ROS submitted by the patient on 10/1/2020   Hypertension  Chronicity: chronic  Onset: more than 1 year ago  Progression since onset: gradually improving  Condition status: controlled  anxiety: No  peripheral edema: No  sweats: No  Agents associated with hypertension: no associated agents  CAD risks: no known risk factors  Compliance problems: no compliance problems  Past treatments: diuretics  Improvement on treatment: significant

## 2020-10-02 LAB — SARS-COV-2 RNA RESP QL NAA+PROBE: NOT DETECTED

## 2020-10-08 ENCOUNTER — LAB VISIT (OUTPATIENT)
Dept: LAB | Facility: OTHER | Age: 45
End: 2020-10-08
Payer: COMMERCIAL

## 2020-10-08 DIAGNOSIS — Z03.818 ENCOUNTER FOR OBSERVATION FOR SUSPECTED EXPOSURE TO OTHER BIOLOGICAL AGENTS RULED OUT: ICD-10-CM

## 2020-10-08 PROCEDURE — U0003 INFECTIOUS AGENT DETECTION BY NUCLEIC ACID (DNA OR RNA); SEVERE ACUTE RESPIRATORY SYNDROME CORONAVIRUS 2 (SARS-COV-2) (CORONAVIRUS DISEASE [COVID-19]), AMPLIFIED PROBE TECHNIQUE, MAKING USE OF HIGH THROUGHPUT TECHNOLOGIES AS DESCRIBED BY CMS-2020-01-R: HCPCS

## 2020-10-09 LAB — SARS-COV-2 RNA RESP QL NAA+PROBE: NOT DETECTED

## 2020-10-22 ENCOUNTER — LAB VISIT (OUTPATIENT)
Dept: LAB | Facility: OTHER | Age: 45
End: 2020-10-22
Payer: COMMERCIAL

## 2020-10-22 DIAGNOSIS — Z03.818 ENCOUNTER FOR OBSERVATION FOR SUSPECTED EXPOSURE TO OTHER BIOLOGICAL AGENTS RULED OUT: ICD-10-CM

## 2020-10-22 PROCEDURE — U0003 INFECTIOUS AGENT DETECTION BY NUCLEIC ACID (DNA OR RNA); SEVERE ACUTE RESPIRATORY SYNDROME CORONAVIRUS 2 (SARS-COV-2) (CORONAVIRUS DISEASE [COVID-19]), AMPLIFIED PROBE TECHNIQUE, MAKING USE OF HIGH THROUGHPUT TECHNOLOGIES AS DESCRIBED BY CMS-2020-01-R: HCPCS

## 2020-10-23 LAB — SARS-COV-2 RNA RESP QL NAA+PROBE: NOT DETECTED

## 2020-10-26 PROBLEM — Z00.00 NORMAL PHYSICAL EXAM, ROUTINE: Status: RESOLVED | Noted: 2020-07-23 | Resolved: 2020-10-26

## 2020-10-26 PROBLEM — Z13.220 SCREENING FOR LIPOID DISORDERS: Status: RESOLVED | Noted: 2020-07-23 | Resolved: 2020-10-26

## 2020-10-29 ENCOUNTER — LAB VISIT (OUTPATIENT)
Dept: LAB | Facility: OTHER | Age: 45
End: 2020-10-29
Payer: COMMERCIAL

## 2020-10-29 DIAGNOSIS — Z03.818 ENCOUNTER FOR OBSERVATION FOR SUSPECTED EXPOSURE TO OTHER BIOLOGICAL AGENTS RULED OUT: ICD-10-CM

## 2020-10-29 LAB — SARS-COV-2 RNA RESP QL NAA+PROBE: NOT DETECTED

## 2020-10-29 PROCEDURE — U0003 INFECTIOUS AGENT DETECTION BY NUCLEIC ACID (DNA OR RNA); SEVERE ACUTE RESPIRATORY SYNDROME CORONAVIRUS 2 (SARS-COV-2) (CORONAVIRUS DISEASE [COVID-19]), AMPLIFIED PROBE TECHNIQUE, MAKING USE OF HIGH THROUGHPUT TECHNOLOGIES AS DESCRIBED BY CMS-2020-01-R: HCPCS

## 2020-11-03 ENCOUNTER — LAB VISIT (OUTPATIENT)
Dept: LAB | Facility: HOSPITAL | Age: 45
End: 2020-11-03
Attending: INTERNAL MEDICINE
Payer: COMMERCIAL

## 2020-11-03 DIAGNOSIS — E83.51 HYPOCALCEMIA: ICD-10-CM

## 2020-11-03 DIAGNOSIS — E87.6 HYPOKALEMIA: ICD-10-CM

## 2020-11-03 DIAGNOSIS — D64.9 NORMOCYTIC ANEMIA: ICD-10-CM

## 2020-11-03 LAB
ALBUMIN SERPL BCP-MCNC: 4.3 G/DL (ref 3.5–5.2)
ALP SERPL-CCNC: 61 U/L (ref 55–135)
ALT SERPL W/O P-5'-P-CCNC: 9 U/L (ref 10–44)
ANION GAP SERPL CALC-SCNC: 9 MMOL/L (ref 8–16)
AST SERPL-CCNC: 16 U/L (ref 10–40)
BILIRUB SERPL-MCNC: 0.7 MG/DL (ref 0.1–1)
BUN SERPL-MCNC: 10 MG/DL (ref 6–20)
CALCIUM SERPL-MCNC: 9.5 MG/DL (ref 8.7–10.5)
CHLORIDE SERPL-SCNC: 100 MMOL/L (ref 95–110)
CO2 SERPL-SCNC: 30 MMOL/L (ref 23–29)
CREAT SERPL-MCNC: 1.1 MG/DL (ref 0.5–1.4)
EST. GFR  (AFRICAN AMERICAN): >60 ML/MIN/1.73 M^2
EST. GFR  (NON AFRICAN AMERICAN): >60 ML/MIN/1.73 M^2
FERRITIN SERPL-MCNC: 42 NG/ML (ref 20–300)
GLUCOSE SERPL-MCNC: 106 MG/DL (ref 70–110)
IRON SERPL-MCNC: 81 UG/DL (ref 30–160)
POTASSIUM SERPL-SCNC: 3.4 MMOL/L (ref 3.5–5.1)
PROT SERPL-MCNC: 8.3 G/DL (ref 6–8.4)
SATURATED IRON: 18 % (ref 20–50)
SODIUM SERPL-SCNC: 139 MMOL/L (ref 136–145)
TOTAL IRON BINDING CAPACITY: 448 UG/DL (ref 250–450)
TRANSFERRIN SERPL-MCNC: 303 MG/DL (ref 200–375)

## 2020-11-03 PROCEDURE — 80053 COMPREHEN METABOLIC PANEL: CPT

## 2020-11-03 PROCEDURE — 83540 ASSAY OF IRON: CPT

## 2020-11-03 PROCEDURE — 82728 ASSAY OF FERRITIN: CPT

## 2020-11-03 PROCEDURE — 36415 COLL VENOUS BLD VENIPUNCTURE: CPT | Mod: PN

## 2020-11-19 ENCOUNTER — LAB VISIT (OUTPATIENT)
Dept: LAB | Facility: OTHER | Age: 45
End: 2020-11-19
Payer: COMMERCIAL

## 2020-11-19 DIAGNOSIS — Z03.818 ENCOUNTER FOR OBSERVATION FOR SUSPECTED EXPOSURE TO OTHER BIOLOGICAL AGENTS RULED OUT: ICD-10-CM

## 2020-11-19 PROCEDURE — U0003 INFECTIOUS AGENT DETECTION BY NUCLEIC ACID (DNA OR RNA); SEVERE ACUTE RESPIRATORY SYNDROME CORONAVIRUS 2 (SARS-COV-2) (CORONAVIRUS DISEASE [COVID-19]), AMPLIFIED PROBE TECHNIQUE, MAKING USE OF HIGH THROUGHPUT TECHNOLOGIES AS DESCRIBED BY CMS-2020-01-R: HCPCS

## 2020-11-22 LAB — SARS-COV-2 RNA RESP QL NAA+PROBE: NOT DETECTED

## 2020-11-25 ENCOUNTER — LAB VISIT (OUTPATIENT)
Dept: LAB | Facility: OTHER | Age: 45
End: 2020-11-25
Payer: COMMERCIAL

## 2020-11-25 DIAGNOSIS — Z03.818 ENCOUNTER FOR OBSERVATION FOR SUSPECTED EXPOSURE TO OTHER BIOLOGICAL AGENTS RULED OUT: ICD-10-CM

## 2020-11-25 PROCEDURE — U0003 INFECTIOUS AGENT DETECTION BY NUCLEIC ACID (DNA OR RNA); SEVERE ACUTE RESPIRATORY SYNDROME CORONAVIRUS 2 (SARS-COV-2) (CORONAVIRUS DISEASE [COVID-19]), AMPLIFIED PROBE TECHNIQUE, MAKING USE OF HIGH THROUGHPUT TECHNOLOGIES AS DESCRIBED BY CMS-2020-01-R: HCPCS

## 2020-11-28 LAB — SARS-COV-2 RNA RESP QL NAA+PROBE: NOT DETECTED

## 2020-12-02 ENCOUNTER — TELEPHONE (OUTPATIENT)
Dept: PRIMARY CARE CLINIC | Facility: CLINIC | Age: 45
End: 2020-12-02

## 2020-12-02 NOTE — PROGRESS NOTES
"[facility] Note    Chief Complaint    No chief complaint on file.      History of Present Illness      Ilene Garber is a 45 y.o. AAF with HTN, Hypokalemia, GERD, Mild Intermittent Asthma, Dysmnenorrhea, RT Rotator cuff tear presents to fu. Last virtual visit - 10/1/20    The patient location is: "work"  The chief complaint leading to consultation is: "Rib flank pain x 2 days".     Visit type: audiovisual    Face to Face time with patient: 20 minutes (mainly due to technical difficulties)  12 minutes of total time spent on the encounter, which includes face to face time and non-face to face time preparing to see the patient (eg, review of tests), Obtaining and/or reviewing separately obtained history, Documenting clinical information in the electronic or other health record, Independently interpreting results (not separately reported) and communicating results to the patient/family/caregiver, or Care coordination (not separately reported).         Each patient to whom he or she provides medical services by telemedicine is:  (1) informed of the relationship between the physician and patient and the respective role of any other health care provider with respect to management of the patient; and (2) notified that he or she may decline to receive medical services by telemedicine and may withdraw from such care at any time.    Notes:     RT flank pain/over rib cage - Onset - yesterday. 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 She noticed th back pain later that evening.  6-7/10 in severity at present. She is taking Ibuprofen 800 mg BID and this has helped sometimes.  She has also tried Bengay which has helped. She has not tried ice.  Heating pad no help.  +Rt flank pain over her rib cage. No radiation.  No numbness/tingling.  No weakness.  Worse with taking a deep breath and when she turns to reposition herself like if she rotates to her RT > Left.  She feels a pull when she " "bends forward. NTTP when she presses over this area of her rib cage.      Low HDL - Her Cholesterol looks ok but her HDL (good Cholesterol) is low.  I recommend exercise to increase this.        Hypocalcemia - rec MVI.  Will recheck level.      HSV genital - Pt n Valtrex prn.      HTN - We recently switched her from HCTZ to Spironolactone.  She is doing well. Her potassium went up from 2.9 to 3.4.  -130/70-71. Edema same and minimal. Cont high potassium diet.      GERD- Pt on Protonix daily.      Dysmenorrhea - Takes Ibuprofen prn abd cramping. + heavy cycles.  Improved s/p D&C. She takes OTC iron x 2 days around her cycle. Will check iron studies.      RT shoulder pain - She was injured at work. She tore her rotator cuff. Takes Ibuprofen prn. "It hasn't bothered me lately".      Mild intermittent Asthma - Allergies are a trigger.  Only needs Proair 5 times/yr. Wearing her K -95 mask all day bothers her. Has had to use it the past couple days. Allergies - trigger.      Obesity - BMI - 30.3 - Rec exercise every other day. Rec low carb diet.     HCM - Flu - 20 - at work;  Tdap - ?;  PCV 13 - ?;  PVX 23 - ?;  Shingrix - due at 52 y.o.;  MGM - due; gets from OB;  PAP -  - wnl per pt;  HIV Screen - utd - neg per pt; C-scope - none - due at 52 y.o.; Prev PCP - Dr. Rory Cooper; Ob/GYN - Dr. Dey; well visit - 22    Past Medical History:  Past Medical History:   Diagnosis Date    Asthma     GERD (gastroesophageal reflux disease)     Hypertension     Ovarian cyst     Rotator cuff tear, right        Past Surgical History:   has a past surgical history that includes  section; LAPAROSCOPY LYSIS OF ADHESIONS (2018); and Bilateral tubal ligation.    Family History:  family history includes COPD in her mother; Coronary artery disease in her mother; Diabetes in her maternal grandmother; Hypertension in her maternal grandmother.     Social History:  Social History     Tobacco Use    Smoking " status: Never Smoker    Smokeless tobacco: Never Used   Substance Use Topics    Alcohol use: Yes     Drinks per session: 1 or 2     Comment: on rare occasion    Drug use: Never       Review of Systems   Constitutional: Negative for fever and weight loss.   Cardiovascular: Positive for chest pain.        Over RT rib cage/flank.    Gastrointestinal: Negative for abdominal pain.   Genitourinary: Negative for dysuria and hematuria.   Musculoskeletal:        See HPI   Neurological: Negative for tingling, weakness and headaches.        Medications:  Outpatient Encounter Medications as of 12/3/2020   Medication Sig Dispense Refill    albuterol (PROAIR HFA) 90 mcg/actuation inhaler Inhale 2 puffs into the lungs every 6 (six) hours as needed for Wheezing. Rescue 18 g 3    albuterol (PROVENTIL) 5 mg/mL nebulizer solution Inhale 2 puffs into the lungs.      fluticasone propionate (FLONASE) 50 mcg/actuation nasal spray 1 spray by Nasal route.      ibuprofen (ADVIL,MOTRIN) 800 MG tablet Take 800 mg by mouth.      pantoprazole (PROTONIX) 40 MG tablet Take 1 tablet (40 mg total) by mouth once daily. 30 tablet 11    potassium chloride SA (K-DUR,KLOR-CON) 20 MEQ tablet 1 po BID x 2 doses 2 tablet 0    spironolactone (ALDACTONE) 25 MG tablet Take 1 tablet by mouth once daily 30 tablet 1    valACYclovir (VALTREX) 500 MG tablet 1 po BID x 3 days prn flare 60 tablet 0     No facility-administered encounter medications on file as of 12/3/2020.        Current Outpatient Medications:     albuterol (PROAIR HFA) 90 mcg/actuation inhaler, Inhale 2 puffs into the lungs every 6 (six) hours as needed for Wheezing. Rescue, Disp: 18 g, Rfl: 3    albuterol (PROVENTIL) 5 mg/mL nebulizer solution, Inhale 2 puffs into the lungs., Disp: , Rfl:     fluticasone propionate (FLONASE) 50 mcg/actuation nasal spray, 1 spray by Nasal route., Disp: , Rfl:     ibuprofen (ADVIL,MOTRIN) 800 MG tablet, Take 800 mg by mouth., Disp: , Rfl:      pantoprazole (PROTONIX) 40 MG tablet, Take 1 tablet (40 mg total) by mouth once daily., Disp: 30 tablet, Rfl: 11    potassium chloride SA (K-DUR,KLOR-CON) 20 MEQ tablet, 1 po BID x 2 doses, Disp: 2 tablet, Rfl: 0    spironolactone (ALDACTONE) 25 MG tablet, Take 1 tablet by mouth once daily, Disp: 30 tablet, Rfl: 1    valACYclovir (VALTREX) 500 MG tablet, 1 po BID x 3 days prn flare, Disp: 60 tablet, Rfl: 0    Allergies:  Review of patient's allergies indicates:  No Known Allergies    Health Maintenance:    There is no immunization history on file for this patient.   Health Maintenance   Topic Date Due    Hepatitis C Screening  1975    TETANUS VACCINE  08/22/1995    Pneumococcal Vaccine (Medium Risk) (1 of 1 - PPSV23) 08/22/1996    Mammogram  08/22/2017    Lipid Panel  Completed      Objective:       ]    Laboratory:  CBC:  Recent Labs   Lab 07/24/20 0724   WBC 5.71   RBC 4.21   Hemoglobin 11.7 L   Hematocrit 38.8   Platelets 282   MCV 92   MCH 27.8   MCHC 30.2 L       CMP:  Recent Labs   Lab 07/24/20 0724 11/03/20  0710   Glucose 86   < > 106   Calcium 8.9   < > 9.5   Albumin 3.8  --  4.3   Total Protein 7.3  --  8.3   Sodium 140   < > 139   Potassium 3.4 L   < > 3.4 L   CO2 26   < > 30 H   Chloride 107   < > 100   BUN 8   < > 10   Creatinine 0.9   < > 1.1   eGFR if  >60.0   < > >60.0   eGFR if non  >60.0   < > >60.0   Alkaline Phosphatase 55  --  61   ALT 11  --  9 L   AST 16  --  16   Total Bilirubin 0.4  --  0.7    < > = values in this interval not displayed.       LIPIDS:  Recent Labs   Lab 07/24/20 0724   TSH 1.243   HDL 39 L   Cholesterol 137   Triglycerides 82   LDL Cholesterol 81.6   HDL/Cholesterol Ratio 28.5   Non-HDL Cholesterol 98   Total Cholesterol/HDL Ratio 3.5       TSH:  Recent Labs   Lab 07/24/20 0724   TSH 1.243     Other:   Lab Results   Component Value Date    FERRITIN 42 11/03/2020    IRON 81 11/03/2020    TRANSFERRIN 303 11/03/2020    TIBC 448  11/03/2020    FESATURATED 18 (L) 11/03/2020     Physical Exam  Constitutional:       General: She is not in acute distress.     Appearance: Normal appearance. She is not ill-appearing, toxic-appearing or diaphoretic.   HENT:      Head: Normocephalic and atraumatic.   Pulmonary:      Effort: No respiratory distress.   Neurological:      General: No focal deficit present.      Mental Status: She is alert and oriented to person, place, and time.   Psychiatric:         Mood and Affect: Mood normal.         Behavior: Behavior normal.             Assessment:       1. Musculoskeletal chest pain        Ilene Garber is a 45 y.o.female with:    1. Musculoskeletal chest pain  - Rec continue Ibuprofen TID prn to be taken with food.  If this causes any GI issues she will alert me. Can alternate with tylenol if needed. No heavy lifting.  Can try ice and then heat prn 20 min intervals.  Can also try Biofreeze. If persists/worsens she will alert me. Monitor BP while on Ibuprofen.     Chronic conditions status updated as per HPI.  Other than changes above, cont current medications and maintain follow up with specialists.  Return to clinic in a few wks as prev sched or sooner if needed.     Uma Henry MD  [facility]                  Answers for HPI/ROS submitted by the patient on 12/2/2020   Back pain  Chronicity: new  Onset: yesterday  Frequency: constantly  Progression since onset: unchanged  Pain location: costovertebral angle  Pain quality: aching  Radiates to: does not radiate  Pain is: worse during the night  Aggravated by: twisting  bladder incontinence: No  bowel incontinence: No  leg pain: No  numbness: No  paresis: No  paresthesias: No  pelvic pain: No  perianal numbness: No  genital pain: No  Pain severity: moderate  Improvement on treatment: no relief

## 2020-12-03 ENCOUNTER — OFFICE VISIT (OUTPATIENT)
Dept: PRIMARY CARE CLINIC | Facility: CLINIC | Age: 45
End: 2020-12-03
Payer: COMMERCIAL

## 2020-12-03 DIAGNOSIS — R07.89 MUSCULOSKELETAL CHEST PAIN: Primary | ICD-10-CM

## 2020-12-03 PROCEDURE — 99213 PR OFFICE/OUTPT VISIT, EST, LEVL III, 20-29 MIN: ICD-10-PCS | Mod: 95,,, | Performed by: INTERNAL MEDICINE

## 2020-12-03 PROCEDURE — 99213 OFFICE O/P EST LOW 20 MIN: CPT | Mod: 95,,, | Performed by: INTERNAL MEDICINE

## 2020-12-16 ENCOUNTER — PATIENT OUTREACH (OUTPATIENT)
Dept: ADMINISTRATIVE | Facility: HOSPITAL | Age: 45
End: 2020-12-16

## 2020-12-16 NOTE — PROGRESS NOTES
Patient due for the following    Hepatitis C Screening     HIV Screening     TETANUS VACCINE     Pneumococcal Vaccine (Medium Risk) (1 of 1 - PPSV23)    Mammogram     Cervical Cancer Screening         Pre-visit chart review completed.  Immunizations: no records in LINKS  Care Everywhere: triggered  Care Teams: up to date  Outreach: Message left for patient to return my call.

## 2020-12-18 ENCOUNTER — PATIENT OUTREACH (OUTPATIENT)
Dept: ADMINISTRATIVE | Facility: HOSPITAL | Age: 45
End: 2020-12-18

## 2020-12-18 NOTE — PROGRESS NOTES
Patient due for the following    Hepatitis C Screening     HIV Screening     TETANUS VACCINE     Pneumococcal Vaccine (Medium Risk) (1 of 1 - PPSV23)    Pap Smear with HPV Cotest     Mammogram       Recevied mammogram, pap and dexa results.  Scanned to media.   updated    Immunizations: LINKS delayed  Care Everywhere: triggered  Care Teams: up dated  Outreach: Message left for patient to return my call.

## 2021-01-07 DIAGNOSIS — K21.9 GASTROESOPHAGEAL REFLUX DISEASE, ESOPHAGITIS PRESENCE NOT SPECIFIED: ICD-10-CM

## 2021-01-07 RX ORDER — PANTOPRAZOLE SODIUM 40 MG/1
40 TABLET, DELAYED RELEASE ORAL DAILY
Qty: 30 TABLET | Refills: 11 | Status: CANCELLED | OUTPATIENT
Start: 2021-01-07 | End: 2022-01-07

## 2021-01-12 DIAGNOSIS — Z12.31 OTHER SCREENING MAMMOGRAM: ICD-10-CM

## 2021-01-19 ENCOUNTER — PATIENT MESSAGE (OUTPATIENT)
Dept: PRIMARY CARE CLINIC | Facility: CLINIC | Age: 46
End: 2021-01-19

## 2021-01-26 ENCOUNTER — HOSPITAL ENCOUNTER (OUTPATIENT)
Dept: RADIOLOGY | Facility: HOSPITAL | Age: 46
Discharge: HOME | End: 2021-01-26
Attending: INTERNAL MEDICINE
Payer: COMMERCIAL

## 2021-01-26 VITALS — BODY MASS INDEX: 30.23 KG/M2 | WEIGHT: 193 LBS

## 2021-01-26 DIAGNOSIS — Z12.31 OTHER SCREENING MAMMOGRAM: ICD-10-CM

## 2021-01-26 PROCEDURE — 77067 SCR MAMMO BI INCL CAD: CPT | Mod: TC,PN

## 2021-01-26 PROCEDURE — 77063 MAMMO DIGITAL SCREENING BILAT WITH TOMO: ICD-10-PCS | Mod: 26,,, | Performed by: RADIOLOGY

## 2021-01-26 PROCEDURE — 77067 SCR MAMMO BI INCL CAD: CPT | Mod: 26,,, | Performed by: RADIOLOGY

## 2021-01-26 PROCEDURE — 77063 BREAST TOMOSYNTHESIS BI: CPT | Mod: 26,,, | Performed by: RADIOLOGY

## 2021-01-26 PROCEDURE — 77067 MAMMO DIGITAL SCREENING BILAT WITH TOMO: ICD-10-PCS | Mod: 26,,, | Performed by: RADIOLOGY

## 2021-02-12 ENCOUNTER — LAB VISIT (OUTPATIENT)
Dept: LAB | Facility: OTHER | Age: 46
End: 2021-02-12
Attending: INTERNAL MEDICINE
Payer: COMMERCIAL

## 2021-02-12 DIAGNOSIS — Z20.822 ENCOUNTER FOR LABORATORY TESTING FOR COVID-19 VIRUS: ICD-10-CM

## 2021-02-12 DIAGNOSIS — I10 HYPERTENSION, UNSPECIFIED TYPE: ICD-10-CM

## 2021-02-12 PROCEDURE — U0003 INFECTIOUS AGENT DETECTION BY NUCLEIC ACID (DNA OR RNA); SEVERE ACUTE RESPIRATORY SYNDROME CORONAVIRUS 2 (SARS-COV-2) (CORONAVIRUS DISEASE [COVID-19]), AMPLIFIED PROBE TECHNIQUE, MAKING USE OF HIGH THROUGHPUT TECHNOLOGIES AS DESCRIBED BY CMS-2020-01-R: HCPCS

## 2021-02-12 RX ORDER — SPIRONOLACTONE 25 MG/1
25 TABLET ORAL DAILY
Qty: 60 TABLET | Refills: 0 | Status: CANCELLED | OUTPATIENT
Start: 2021-02-12

## 2021-02-13 LAB — SARS-COV-2 RNA RESP QL NAA+PROBE: NOT DETECTED

## 2021-02-19 ENCOUNTER — LAB VISIT (OUTPATIENT)
Dept: LAB | Facility: OTHER | Age: 46
End: 2021-02-19
Attending: INTERNAL MEDICINE
Payer: COMMERCIAL

## 2021-02-19 DIAGNOSIS — Z20.822 ENCOUNTER FOR LABORATORY TESTING FOR COVID-19 VIRUS: ICD-10-CM

## 2021-02-19 LAB — SARS-COV-2 RNA RESP QL NAA+PROBE: NOT DETECTED

## 2021-02-19 PROCEDURE — U0003 INFECTIOUS AGENT DETECTION BY NUCLEIC ACID (DNA OR RNA); SEVERE ACUTE RESPIRATORY SYNDROME CORONAVIRUS 2 (SARS-COV-2) (CORONAVIRUS DISEASE [COVID-19]), AMPLIFIED PROBE TECHNIQUE, MAKING USE OF HIGH THROUGHPUT TECHNOLOGIES AS DESCRIBED BY CMS-2020-01-R: HCPCS

## 2021-03-26 ENCOUNTER — LAB VISIT (OUTPATIENT)
Dept: LAB | Facility: OTHER | Age: 46
End: 2021-03-26
Payer: COMMERCIAL

## 2021-03-26 DIAGNOSIS — Z20.822 ENCOUNTER FOR LABORATORY TESTING FOR COVID-19 VIRUS: ICD-10-CM

## 2021-03-26 PROCEDURE — U0003 INFECTIOUS AGENT DETECTION BY NUCLEIC ACID (DNA OR RNA); SEVERE ACUTE RESPIRATORY SYNDROME CORONAVIRUS 2 (SARS-COV-2) (CORONAVIRUS DISEASE [COVID-19]), AMPLIFIED PROBE TECHNIQUE, MAKING USE OF HIGH THROUGHPUT TECHNOLOGIES AS DESCRIBED BY CMS-2020-01-R: HCPCS | Performed by: NURSE PRACTITIONER

## 2021-03-27 LAB — SARS-COV-2 RNA RESP QL NAA+PROBE: NOT DETECTED

## 2021-04-16 ENCOUNTER — LAB VISIT (OUTPATIENT)
Dept: LAB | Facility: OTHER | Age: 46
End: 2021-04-16
Payer: COMMERCIAL

## 2021-04-16 DIAGNOSIS — Z20.822 ENCOUNTER FOR LABORATORY TESTING FOR COVID-19 VIRUS: ICD-10-CM

## 2021-04-16 PROCEDURE — U0003 INFECTIOUS AGENT DETECTION BY NUCLEIC ACID (DNA OR RNA); SEVERE ACUTE RESPIRATORY SYNDROME CORONAVIRUS 2 (SARS-COV-2) (CORONAVIRUS DISEASE [COVID-19]), AMPLIFIED PROBE TECHNIQUE, MAKING USE OF HIGH THROUGHPUT TECHNOLOGIES AS DESCRIBED BY CMS-2020-01-R: HCPCS | Performed by: NURSE PRACTITIONER

## 2021-04-17 LAB — SARS-COV-2 RNA RESP QL NAA+PROBE: NOT DETECTED

## 2021-07-09 ENCOUNTER — LAB VISIT (OUTPATIENT)
Dept: LAB | Facility: OTHER | Age: 46
End: 2021-07-09
Payer: COMMERCIAL

## 2021-07-09 DIAGNOSIS — Z20.822 ENCOUNTER FOR LABORATORY TESTING FOR COVID-19 VIRUS: ICD-10-CM

## 2021-07-09 PROCEDURE — U0003 INFECTIOUS AGENT DETECTION BY NUCLEIC ACID (DNA OR RNA); SEVERE ACUTE RESPIRATORY SYNDROME CORONAVIRUS 2 (SARS-COV-2) (CORONAVIRUS DISEASE [COVID-19]), AMPLIFIED PROBE TECHNIQUE, MAKING USE OF HIGH THROUGHPUT TECHNOLOGIES AS DESCRIBED BY CMS-2020-01-R: HCPCS | Performed by: NURSE PRACTITIONER

## 2021-07-10 LAB
SARS-COV-2 RNA RESP QL NAA+PROBE: NOT DETECTED
SARS-COV-2- CYCLE NUMBER: -1

## 2021-08-13 ENCOUNTER — LAB VISIT (OUTPATIENT)
Dept: LAB | Facility: OTHER | Age: 46
End: 2021-08-13
Payer: COMMERCIAL

## 2021-08-13 DIAGNOSIS — Z20.822 ENCOUNTER FOR LABORATORY TESTING FOR COVID-19 VIRUS: ICD-10-CM

## 2021-08-13 PROCEDURE — U0003 INFECTIOUS AGENT DETECTION BY NUCLEIC ACID (DNA OR RNA); SEVERE ACUTE RESPIRATORY SYNDROME CORONAVIRUS 2 (SARS-COV-2) (CORONAVIRUS DISEASE [COVID-19]), AMPLIFIED PROBE TECHNIQUE, MAKING USE OF HIGH THROUGHPUT TECHNOLOGIES AS DESCRIBED BY CMS-2020-01-R: HCPCS | Performed by: NURSE PRACTITIONER

## 2021-08-15 DIAGNOSIS — K21.9 GASTROESOPHAGEAL REFLUX DISEASE, ESOPHAGITIS PRESENCE NOT SPECIFIED: ICD-10-CM

## 2021-08-15 DIAGNOSIS — I10 HYPERTENSION, UNSPECIFIED TYPE: ICD-10-CM

## 2021-08-15 DIAGNOSIS — K21.9 GASTROESOPHAGEAL REFLUX DISEASE: ICD-10-CM

## 2021-08-15 RX ORDER — PANTOPRAZOLE SODIUM 40 MG/1
40 TABLET, DELAYED RELEASE ORAL DAILY
Qty: 30 TABLET | Refills: 11 | Status: CANCELLED | OUTPATIENT
Start: 2021-08-15 | End: 2022-08-15

## 2021-08-15 RX ORDER — SPIRONOLACTONE 25 MG/1
25 TABLET ORAL DAILY
Qty: 60 TABLET | Refills: 0 | Status: CANCELLED | OUTPATIENT
Start: 2021-08-15

## 2021-08-16 LAB
SARS-COV-2 RNA RESP QL NAA+PROBE: NORMAL
TEST PERFORMANCE INFO SPEC: NORMAL

## 2021-08-16 RX ORDER — PANTOPRAZOLE SODIUM 40 MG/1
TABLET, DELAYED RELEASE ORAL
Qty: 30 TABLET | Refills: 0 | Status: SHIPPED | OUTPATIENT
Start: 2021-08-16 | End: 2021-11-17 | Stop reason: SDUPTHER

## 2021-08-16 RX ORDER — SPIRONOLACTONE 25 MG/1
TABLET ORAL
Qty: 30 TABLET | Refills: 0 | Status: SHIPPED | OUTPATIENT
Start: 2021-08-16 | End: 2021-10-15

## 2021-08-27 ENCOUNTER — LAB VISIT (OUTPATIENT)
Dept: LAB | Facility: OTHER | Age: 46
End: 2021-08-27
Payer: COMMERCIAL

## 2021-08-27 DIAGNOSIS — Z20.822 ENCOUNTER FOR LABORATORY TESTING FOR COVID-19 VIRUS: ICD-10-CM

## 2021-08-27 PROCEDURE — U0003 INFECTIOUS AGENT DETECTION BY NUCLEIC ACID (DNA OR RNA); SEVERE ACUTE RESPIRATORY SYNDROME CORONAVIRUS 2 (SARS-COV-2) (CORONAVIRUS DISEASE [COVID-19]), AMPLIFIED PROBE TECHNIQUE, MAKING USE OF HIGH THROUGHPUT TECHNOLOGIES AS DESCRIBED BY CMS-2020-01-R: HCPCS | Performed by: NURSE PRACTITIONER

## 2021-08-29 LAB
SARS-COV-2 RNA RESP QL NAA+PROBE: NOT DETECTED
SARS-COV-2- CYCLE NUMBER: NORMAL

## 2021-09-23 ENCOUNTER — TELEPHONE (OUTPATIENT)
Dept: PRIMARY CARE CLINIC | Facility: CLINIC | Age: 46
End: 2021-09-23

## 2021-10-08 ENCOUNTER — LAB VISIT (OUTPATIENT)
Dept: LAB | Facility: OTHER | Age: 46
End: 2021-10-08
Payer: COMMERCIAL

## 2021-10-08 DIAGNOSIS — Z20.822 ENCOUNTER FOR LABORATORY TESTING FOR COVID-19 VIRUS: ICD-10-CM

## 2021-10-08 LAB
SARS-COV-2 RNA RESP QL NAA+PROBE: NOT DETECTED
SARS-COV-2- CYCLE NUMBER: NORMAL

## 2021-10-08 PROCEDURE — U0003 INFECTIOUS AGENT DETECTION BY NUCLEIC ACID (DNA OR RNA); SEVERE ACUTE RESPIRATORY SYNDROME CORONAVIRUS 2 (SARS-COV-2) (CORONAVIRUS DISEASE [COVID-19]), AMPLIFIED PROBE TECHNIQUE, MAKING USE OF HIGH THROUGHPUT TECHNOLOGIES AS DESCRIBED BY CMS-2020-01-R: HCPCS | Performed by: NURSE PRACTITIONER

## 2021-10-22 DIAGNOSIS — Z20.822 ENCOUNTER FOR LABORATORY TESTING FOR COVID-19 VIRUS: ICD-10-CM

## 2021-10-29 ENCOUNTER — LAB VISIT (OUTPATIENT)
Dept: LAB | Facility: OTHER | Age: 46
End: 2021-10-29
Payer: COMMERCIAL

## 2021-10-29 DIAGNOSIS — Z20.822 ENCOUNTER FOR LABORATORY TESTING FOR COVID-19 VIRUS: ICD-10-CM

## 2021-10-29 LAB
SARS-COV-2 RNA RESP QL NAA+PROBE: NOT DETECTED
SARS-COV-2- CYCLE NUMBER: NORMAL

## 2021-10-29 PROCEDURE — U0003 INFECTIOUS AGENT DETECTION BY NUCLEIC ACID (DNA OR RNA); SEVERE ACUTE RESPIRATORY SYNDROME CORONAVIRUS 2 (SARS-COV-2) (CORONAVIRUS DISEASE [COVID-19]), AMPLIFIED PROBE TECHNIQUE, MAKING USE OF HIGH THROUGHPUT TECHNOLOGIES AS DESCRIBED BY CMS-2020-01-R: HCPCS | Performed by: NURSE PRACTITIONER

## 2021-11-01 ENCOUNTER — LAB VISIT (OUTPATIENT)
Dept: LAB | Facility: HOSPITAL | Age: 46
End: 2021-11-01
Attending: NURSE PRACTITIONER
Payer: COMMERCIAL

## 2021-11-01 ENCOUNTER — OFFICE VISIT (OUTPATIENT)
Dept: OBSTETRICS AND GYNECOLOGY | Facility: CLINIC | Age: 46
End: 2021-11-01
Payer: COMMERCIAL

## 2021-11-01 VITALS — WEIGHT: 205 LBS | BODY MASS INDEX: 32.11 KG/M2 | SYSTOLIC BLOOD PRESSURE: 124 MMHG | DIASTOLIC BLOOD PRESSURE: 70 MMHG

## 2021-11-01 DIAGNOSIS — Z11.3 SCREEN FOR STD (SEXUALLY TRANSMITTED DISEASE): ICD-10-CM

## 2021-11-01 DIAGNOSIS — R10.2 PELVIC PAIN: ICD-10-CM

## 2021-11-01 DIAGNOSIS — Z01.419 ENCOUNTER FOR WELL WOMAN EXAM WITH ROUTINE GYNECOLOGICAL EXAM: Primary | ICD-10-CM

## 2021-11-01 DIAGNOSIS — Z12.4 SCREENING FOR MALIGNANT NEOPLASM OF CERVIX: ICD-10-CM

## 2021-11-01 DIAGNOSIS — N89.8 VAGINAL ODOR: ICD-10-CM

## 2021-11-01 PROCEDURE — 3008F BODY MASS INDEX DOCD: CPT | Mod: CPTII,S$GLB,, | Performed by: NURSE PRACTITIONER

## 2021-11-01 PROCEDURE — 3008F PR BODY MASS INDEX (BMI) DOCUMENTED: ICD-10-PCS | Mod: CPTII,S$GLB,, | Performed by: NURSE PRACTITIONER

## 2021-11-01 PROCEDURE — 3074F PR MOST RECENT SYSTOLIC BLOOD PRESSURE < 130 MM HG: ICD-10-PCS | Mod: CPTII,S$GLB,, | Performed by: NURSE PRACTITIONER

## 2021-11-01 PROCEDURE — 3078F PR MOST RECENT DIASTOLIC BLOOD PRESSURE < 80 MM HG: ICD-10-PCS | Mod: CPTII,S$GLB,, | Performed by: NURSE PRACTITIONER

## 2021-11-01 PROCEDURE — 87491 CHLMYD TRACH DNA AMP PROBE: CPT | Performed by: NURSE PRACTITIONER

## 2021-11-01 PROCEDURE — 3078F DIAST BP <80 MM HG: CPT | Mod: CPTII,S$GLB,, | Performed by: NURSE PRACTITIONER

## 2021-11-01 PROCEDURE — 87591 N.GONORRHOEAE DNA AMP PROB: CPT | Mod: 59 | Performed by: NURSE PRACTITIONER

## 2021-11-01 PROCEDURE — 99999 PR PBB SHADOW E&M-EST. PATIENT-LVL III: CPT | Mod: PBBFAC,,, | Performed by: NURSE PRACTITIONER

## 2021-11-01 PROCEDURE — 99386 PREV VISIT NEW AGE 40-64: CPT | Mod: S$GLB,,, | Performed by: NURSE PRACTITIONER

## 2021-11-01 PROCEDURE — 3074F SYST BP LT 130 MM HG: CPT | Mod: CPTII,S$GLB,, | Performed by: NURSE PRACTITIONER

## 2021-11-01 PROCEDURE — 87481 CANDIDA DNA AMP PROBE: CPT | Mod: 59 | Performed by: NURSE PRACTITIONER

## 2021-11-01 PROCEDURE — 87389 HIV-1 AG W/HIV-1&-2 AB AG IA: CPT | Performed by: NURSE PRACTITIONER

## 2021-11-01 PROCEDURE — 36415 COLL VENOUS BLD VENIPUNCTURE: CPT | Mod: PN | Performed by: NURSE PRACTITIONER

## 2021-11-01 PROCEDURE — 99386 PR PREVENTIVE VISIT,NEW,40-64: ICD-10-PCS | Mod: S$GLB,,, | Performed by: NURSE PRACTITIONER

## 2021-11-01 PROCEDURE — 88175 CYTOPATH C/V AUTO FLUID REDO: CPT | Performed by: NURSE PRACTITIONER

## 2021-11-01 PROCEDURE — 87624 HPV HI-RISK TYP POOLED RSLT: CPT | Performed by: NURSE PRACTITIONER

## 2021-11-01 PROCEDURE — 86592 SYPHILIS TEST NON-TREP QUAL: CPT | Performed by: NURSE PRACTITIONER

## 2021-11-01 PROCEDURE — 99999 PR PBB SHADOW E&M-EST. PATIENT-LVL III: ICD-10-PCS | Mod: PBBFAC,,, | Performed by: NURSE PRACTITIONER

## 2021-11-01 PROCEDURE — 80074 ACUTE HEPATITIS PANEL: CPT | Performed by: NURSE PRACTITIONER

## 2021-11-01 RX ORDER — METRONIDAZOLE 7.5 MG/G
1 GEL VAGINAL DAILY
Qty: 70 G | Refills: 0 | Status: SHIPPED | OUTPATIENT
Start: 2021-11-01 | End: 2021-11-05

## 2021-11-02 LAB
HAV IGM SERPL QL IA: NEGATIVE
HBV CORE IGM SERPL QL IA: NEGATIVE
HBV SURFACE AG SERPL QL IA: NEGATIVE
HCV AB SERPL QL IA: NEGATIVE
HIV 1+2 AB+HIV1 P24 AG SERPL QL IA: NEGATIVE
RPR SER QL: NORMAL

## 2021-11-04 ENCOUNTER — PATIENT OUTREACH (OUTPATIENT)
Dept: ADMINISTRATIVE | Facility: OTHER | Age: 46
End: 2021-11-04
Payer: COMMERCIAL

## 2021-11-05 ENCOUNTER — OFFICE VISIT (OUTPATIENT)
Dept: GASTROENTEROLOGY | Facility: CLINIC | Age: 46
End: 2021-11-05
Payer: COMMERCIAL

## 2021-11-05 ENCOUNTER — LAB VISIT (OUTPATIENT)
Dept: LAB | Facility: HOSPITAL | Age: 46
End: 2021-11-05
Attending: INTERNAL MEDICINE
Payer: COMMERCIAL

## 2021-11-05 VITALS
HEIGHT: 66 IN | BODY MASS INDEX: 33.2 KG/M2 | SYSTOLIC BLOOD PRESSURE: 161 MMHG | HEART RATE: 78 BPM | DIASTOLIC BLOOD PRESSURE: 82 MMHG | WEIGHT: 206.56 LBS

## 2021-11-05 DIAGNOSIS — R11.0 NAUSEA: ICD-10-CM

## 2021-11-05 DIAGNOSIS — Z79.899 ENCOUNTER FOR MONITORING LONG-TERM PROTON PUMP INHIBITOR THERAPY: ICD-10-CM

## 2021-11-05 DIAGNOSIS — D50.9 IRON DEFICIENCY ANEMIA, UNSPECIFIED IRON DEFICIENCY ANEMIA TYPE: ICD-10-CM

## 2021-11-05 DIAGNOSIS — Z51.81 ENCOUNTER FOR MONITORING LONG-TERM PROTON PUMP INHIBITOR THERAPY: ICD-10-CM

## 2021-11-05 DIAGNOSIS — K59.09 CONSTIPATION, CHRONIC: ICD-10-CM

## 2021-11-05 DIAGNOSIS — K21.9 GASTROESOPHAGEAL REFLUX DISEASE, UNSPECIFIED WHETHER ESOPHAGITIS PRESENT: ICD-10-CM

## 2021-11-05 DIAGNOSIS — D64.9 LOW HEMOGLOBIN: ICD-10-CM

## 2021-11-05 DIAGNOSIS — R10.13 EPIGASTRIC PAIN: ICD-10-CM

## 2021-11-05 DIAGNOSIS — R11.0 NAUSEA: Primary | ICD-10-CM

## 2021-11-05 LAB
25(OH)D3+25(OH)D2 SERPL-MCNC: 10 NG/ML (ref 30–96)
BASOPHILS # BLD AUTO: 0.06 K/UL (ref 0–0.2)
BASOPHILS NFR BLD: 1 % (ref 0–1.9)
DIFFERENTIAL METHOD: ABNORMAL
EOSINOPHIL # BLD AUTO: 0.1 K/UL (ref 0–0.5)
EOSINOPHIL NFR BLD: 0.8 % (ref 0–8)
ERYTHROCYTE [DISTWIDTH] IN BLOOD BY AUTOMATED COUNT: 13.7 % (ref 11.5–14.5)
FERRITIN SERPL-MCNC: 51 NG/ML (ref 20–300)
HCG INTACT+B SERPL-ACNC: <2.4 MIU/ML
HCT VFR BLD AUTO: 39.5 % (ref 37–48.5)
HGB BLD-MCNC: 12.4 G/DL (ref 12–16)
IGA SERPL-MCNC: 201 MG/DL (ref 40–350)
IMM GRANULOCYTES # BLD AUTO: 0.02 K/UL (ref 0–0.04)
IMM GRANULOCYTES NFR BLD AUTO: 0.3 % (ref 0–0.5)
IRON SERPL-MCNC: 69 UG/DL (ref 30–160)
LIPASE SERPL-CCNC: 13 U/L (ref 4–60)
LYMPHOCYTES # BLD AUTO: 2 K/UL (ref 1–4.8)
LYMPHOCYTES NFR BLD: 33.9 % (ref 18–48)
MCH RBC QN AUTO: 28.5 PG (ref 27–31)
MCHC RBC AUTO-ENTMCNC: 31.4 G/DL (ref 32–36)
MCV RBC AUTO: 91 FL (ref 82–98)
MONOCYTES # BLD AUTO: 0.5 K/UL (ref 0.3–1)
MONOCYTES NFR BLD: 9.1 % (ref 4–15)
NEUTROPHILS # BLD AUTO: 3.3 K/UL (ref 1.8–7.7)
NEUTROPHILS NFR BLD: 54.9 % (ref 38–73)
NRBC BLD-RTO: 0 /100 WBC
PLATELET # BLD AUTO: 299 K/UL (ref 150–450)
PMV BLD AUTO: 11 FL (ref 9.2–12.9)
RBC # BLD AUTO: 4.35 M/UL (ref 4–5.4)
SATURATED IRON: 17 % (ref 20–50)
TOTAL IRON BINDING CAPACITY: 411 UG/DL (ref 250–450)
TRANSFERRIN SERPL-MCNC: 278 MG/DL (ref 200–375)
VIT B12 SERPL-MCNC: 253 PG/ML (ref 210–950)
WBC # BLD AUTO: 5.96 K/UL (ref 3.9–12.7)

## 2021-11-05 PROCEDURE — 99999 PR PBB SHADOW E&M-EST. PATIENT-LVL III: CPT | Mod: PBBFAC,,, | Performed by: INTERNAL MEDICINE

## 2021-11-05 PROCEDURE — 82306 VITAMIN D 25 HYDROXY: CPT | Performed by: INTERNAL MEDICINE

## 2021-11-05 PROCEDURE — 3079F DIAST BP 80-89 MM HG: CPT | Mod: CPTII,S$GLB,, | Performed by: INTERNAL MEDICINE

## 2021-11-05 PROCEDURE — 82607 VITAMIN B-12: CPT | Performed by: INTERNAL MEDICINE

## 2021-11-05 PROCEDURE — 1160F RVW MEDS BY RX/DR IN RCRD: CPT | Mod: CPTII,S$GLB,, | Performed by: INTERNAL MEDICINE

## 2021-11-05 PROCEDURE — 85025 COMPLETE CBC W/AUTO DIFF WBC: CPT | Performed by: INTERNAL MEDICINE

## 2021-11-05 PROCEDURE — 3077F SYST BP >= 140 MM HG: CPT | Mod: CPTII,S$GLB,, | Performed by: INTERNAL MEDICINE

## 2021-11-05 PROCEDURE — 1159F PR MEDICATION LIST DOCUMENTED IN MEDICAL RECORD: ICD-10-PCS | Mod: CPTII,S$GLB,, | Performed by: INTERNAL MEDICINE

## 2021-11-05 PROCEDURE — 82784 ASSAY IGA/IGD/IGG/IGM EACH: CPT | Performed by: INTERNAL MEDICINE

## 2021-11-05 PROCEDURE — 3077F PR MOST RECENT SYSTOLIC BLOOD PRESSURE >= 140 MM HG: ICD-10-PCS | Mod: CPTII,S$GLB,, | Performed by: INTERNAL MEDICINE

## 2021-11-05 PROCEDURE — 82728 ASSAY OF FERRITIN: CPT | Performed by: INTERNAL MEDICINE

## 2021-11-05 PROCEDURE — 99204 OFFICE O/P NEW MOD 45 MIN: CPT | Mod: S$GLB,,, | Performed by: INTERNAL MEDICINE

## 2021-11-05 PROCEDURE — 84702 CHORIONIC GONADOTROPIN TEST: CPT | Performed by: INTERNAL MEDICINE

## 2021-11-05 PROCEDURE — 3079F PR MOST RECENT DIASTOLIC BLOOD PRESSURE 80-89 MM HG: ICD-10-PCS | Mod: CPTII,S$GLB,, | Performed by: INTERNAL MEDICINE

## 2021-11-05 PROCEDURE — 83516 IMMUNOASSAY NONANTIBODY: CPT | Performed by: INTERNAL MEDICINE

## 2021-11-05 PROCEDURE — 1160F PR REVIEW ALL MEDS BY PRESCRIBER/CLIN PHARMACIST DOCUMENTED: ICD-10-PCS | Mod: CPTII,S$GLB,, | Performed by: INTERNAL MEDICINE

## 2021-11-05 PROCEDURE — 99999 PR PBB SHADOW E&M-EST. PATIENT-LVL III: ICD-10-PCS | Mod: PBBFAC,,, | Performed by: INTERNAL MEDICINE

## 2021-11-05 PROCEDURE — 3008F BODY MASS INDEX DOCD: CPT | Mod: CPTII,S$GLB,, | Performed by: INTERNAL MEDICINE

## 2021-11-05 PROCEDURE — 84466 ASSAY OF TRANSFERRIN: CPT | Performed by: INTERNAL MEDICINE

## 2021-11-05 PROCEDURE — 83690 ASSAY OF LIPASE: CPT | Performed by: INTERNAL MEDICINE

## 2021-11-05 PROCEDURE — 99204 PR OFFICE/OUTPT VISIT, NEW, LEVL IV, 45-59 MIN: ICD-10-PCS | Mod: S$GLB,,, | Performed by: INTERNAL MEDICINE

## 2021-11-05 PROCEDURE — 1159F MED LIST DOCD IN RCRD: CPT | Mod: CPTII,S$GLB,, | Performed by: INTERNAL MEDICINE

## 2021-11-05 PROCEDURE — 3008F PR BODY MASS INDEX (BMI) DOCUMENTED: ICD-10-PCS | Mod: CPTII,S$GLB,, | Performed by: INTERNAL MEDICINE

## 2021-11-05 RX ORDER — ONDANSETRON 4 MG/1
4 TABLET, FILM COATED ORAL EVERY 12 HOURS PRN
Qty: 60 TABLET | Refills: 0 | Status: SHIPPED | OUTPATIENT
Start: 2021-11-05 | End: 2021-12-05

## 2021-11-07 DIAGNOSIS — E61.1 IRON DEFICIENCY: ICD-10-CM

## 2021-11-07 DIAGNOSIS — E55.9 VITAMIN D INSUFFICIENCY: Primary | ICD-10-CM

## 2021-11-07 RX ORDER — FERROUS GLUCONATE 324(38)MG
324 TABLET ORAL
Qty: 90 TABLET | Refills: 1 | Status: SHIPPED | OUTPATIENT
Start: 2021-11-07 | End: 2022-05-06

## 2021-11-07 RX ORDER — ACETAMINOPHEN 500 MG
2 TABLET ORAL DAILY
Qty: 180 CAPSULE | Refills: 1 | Status: SHIPPED | OUTPATIENT
Start: 2021-11-07 | End: 2022-05-06

## 2021-11-07 RX ORDER — ERGOCALCIFEROL 1.25 MG/1
50000 CAPSULE ORAL
Qty: 12 CAPSULE | Refills: 0 | Status: SHIPPED | OUTPATIENT
Start: 2021-11-07 | End: 2022-01-24

## 2021-11-08 ENCOUNTER — PATIENT MESSAGE (OUTPATIENT)
Dept: GASTROENTEROLOGY | Facility: CLINIC | Age: 46
End: 2021-11-08
Payer: COMMERCIAL

## 2021-11-08 ENCOUNTER — PATIENT MESSAGE (OUTPATIENT)
Dept: OBSTETRICS AND GYNECOLOGY | Facility: CLINIC | Age: 46
End: 2021-11-08
Payer: COMMERCIAL

## 2021-11-08 LAB
FINAL PATHOLOGIC DIAGNOSIS: NORMAL
Lab: NORMAL

## 2021-11-09 LAB
HPV HR 12 DNA SPEC QL NAA+PROBE: NEGATIVE
HPV16 AG SPEC QL: NEGATIVE
HPV18 DNA SPEC QL NAA+PROBE: NEGATIVE

## 2021-11-11 ENCOUNTER — TELEPHONE (OUTPATIENT)
Dept: GASTROENTEROLOGY | Facility: CLINIC | Age: 46
End: 2021-11-11
Payer: COMMERCIAL

## 2021-11-11 LAB
BACTERIAL VAGINOSIS DNA: NEGATIVE
CANDIDA GLABRATA DNA: NEGATIVE
CANDIDA KRUSEI DNA: NEGATIVE
CANDIDA RRNA VAG QL PROBE: NEGATIVE
T VAGINALIS RRNA GENITAL QL PROBE: NEGATIVE
TTG IGA SER-ACNC: 5 UNITS

## 2021-11-12 ENCOUNTER — LAB VISIT (OUTPATIENT)
Dept: LAB | Facility: OTHER | Age: 46
End: 2021-11-12
Payer: COMMERCIAL

## 2021-11-12 DIAGNOSIS — Z20.822 ENCOUNTER FOR LABORATORY TESTING FOR COVID-19 VIRUS: ICD-10-CM

## 2021-11-12 PROCEDURE — U0003 INFECTIOUS AGENT DETECTION BY NUCLEIC ACID (DNA OR RNA); SEVERE ACUTE RESPIRATORY SYNDROME CORONAVIRUS 2 (SARS-COV-2) (CORONAVIRUS DISEASE [COVID-19]), AMPLIFIED PROBE TECHNIQUE, MAKING USE OF HIGH THROUGHPUT TECHNOLOGIES AS DESCRIBED BY CMS-2020-01-R: HCPCS | Performed by: NURSE PRACTITIONER

## 2021-11-13 LAB
SARS-COV-2 RNA RESP QL NAA+PROBE: NOT DETECTED
SARS-COV-2- CYCLE NUMBER: NORMAL

## 2021-11-17 ENCOUNTER — OFFICE VISIT (OUTPATIENT)
Dept: PRIMARY CARE CLINIC | Facility: CLINIC | Age: 46
End: 2021-11-17
Payer: COMMERCIAL

## 2021-11-17 VITALS
WEIGHT: 207.88 LBS | TEMPERATURE: 98 F | OXYGEN SATURATION: 99 % | HEIGHT: 66 IN | HEART RATE: 93 BPM | RESPIRATION RATE: 19 BRPM | SYSTOLIC BLOOD PRESSURE: 127 MMHG | DIASTOLIC BLOOD PRESSURE: 77 MMHG | BODY MASS INDEX: 33.41 KG/M2

## 2021-11-17 DIAGNOSIS — Z12.31 SCREENING MAMMOGRAM, ENCOUNTER FOR: ICD-10-CM

## 2021-11-17 DIAGNOSIS — E87.6 HYPOKALEMIA: ICD-10-CM

## 2021-11-17 DIAGNOSIS — E61.1 IRON DEFICIENCY: ICD-10-CM

## 2021-11-17 DIAGNOSIS — E55.9 VITAMIN D DEFICIENCY: ICD-10-CM

## 2021-11-17 DIAGNOSIS — Z13.220 SCREENING FOR LIPOID DISORDERS: ICD-10-CM

## 2021-11-17 DIAGNOSIS — K21.9 GASTROESOPHAGEAL REFLUX DISEASE, UNSPECIFIED WHETHER ESOPHAGITIS PRESENT: ICD-10-CM

## 2021-11-17 DIAGNOSIS — Z00.00 NORMAL PHYSICAL EXAM, ROUTINE: Primary | ICD-10-CM

## 2021-11-17 DIAGNOSIS — J30.9 ALLERGIC RHINITIS, UNSPECIFIED SEASONALITY, UNSPECIFIED TRIGGER: ICD-10-CM

## 2021-11-17 DIAGNOSIS — E66.09 CLASS 1 OBESITY DUE TO EXCESS CALORIES WITH SERIOUS COMORBIDITY AND BODY MASS INDEX (BMI) OF 33.0 TO 33.9 IN ADULT: ICD-10-CM

## 2021-11-17 DIAGNOSIS — M54.50 INTERMITTENT LOW BACK PAIN: ICD-10-CM

## 2021-11-17 DIAGNOSIS — I10 HYPERTENSION, UNSPECIFIED TYPE: ICD-10-CM

## 2021-11-17 DIAGNOSIS — J45.20 MILD INTERMITTENT ASTHMA WITHOUT COMPLICATION: ICD-10-CM

## 2021-11-17 PROCEDURE — 3008F PR BODY MASS INDEX (BMI) DOCUMENTED: ICD-10-PCS | Mod: CPTII,S$GLB,, | Performed by: INTERNAL MEDICINE

## 2021-11-17 PROCEDURE — 1159F MED LIST DOCD IN RCRD: CPT | Mod: CPTII,S$GLB,, | Performed by: INTERNAL MEDICINE

## 2021-11-17 PROCEDURE — 3074F PR MOST RECENT SYSTOLIC BLOOD PRESSURE < 130 MM HG: ICD-10-PCS | Mod: CPTII,S$GLB,, | Performed by: INTERNAL MEDICINE

## 2021-11-17 PROCEDURE — 99396 PREV VISIT EST AGE 40-64: CPT | Mod: S$GLB,,, | Performed by: INTERNAL MEDICINE

## 2021-11-17 PROCEDURE — 3078F PR MOST RECENT DIASTOLIC BLOOD PRESSURE < 80 MM HG: ICD-10-PCS | Mod: CPTII,S$GLB,, | Performed by: INTERNAL MEDICINE

## 2021-11-17 PROCEDURE — 3078F DIAST BP <80 MM HG: CPT | Mod: CPTII,S$GLB,, | Performed by: INTERNAL MEDICINE

## 2021-11-17 PROCEDURE — 99999 PR PBB SHADOW E&M-EST. PATIENT-LVL IV: CPT | Mod: PBBFAC,,, | Performed by: INTERNAL MEDICINE

## 2021-11-17 PROCEDURE — 99396 PR PREVENTIVE VISIT,EST,40-64: ICD-10-PCS | Mod: S$GLB,,, | Performed by: INTERNAL MEDICINE

## 2021-11-17 PROCEDURE — 1159F PR MEDICATION LIST DOCUMENTED IN MEDICAL RECORD: ICD-10-PCS | Mod: CPTII,S$GLB,, | Performed by: INTERNAL MEDICINE

## 2021-11-17 PROCEDURE — 3074F SYST BP LT 130 MM HG: CPT | Mod: CPTII,S$GLB,, | Performed by: INTERNAL MEDICINE

## 2021-11-17 PROCEDURE — 1160F RVW MEDS BY RX/DR IN RCRD: CPT | Mod: CPTII,S$GLB,, | Performed by: INTERNAL MEDICINE

## 2021-11-17 PROCEDURE — 1160F PR REVIEW ALL MEDS BY PRESCRIBER/CLIN PHARMACIST DOCUMENTED: ICD-10-PCS | Mod: CPTII,S$GLB,, | Performed by: INTERNAL MEDICINE

## 2021-11-17 PROCEDURE — 3008F BODY MASS INDEX DOCD: CPT | Mod: CPTII,S$GLB,, | Performed by: INTERNAL MEDICINE

## 2021-11-17 PROCEDURE — 99999 PR PBB SHADOW E&M-EST. PATIENT-LVL IV: ICD-10-PCS | Mod: PBBFAC,,, | Performed by: INTERNAL MEDICINE

## 2021-11-17 RX ORDER — SPIRONOLACTONE 25 MG/1
25 TABLET ORAL DAILY
Qty: 90 TABLET | Refills: 3 | Status: SHIPPED | OUTPATIENT
Start: 2021-11-17 | End: 2022-12-09

## 2021-11-17 RX ORDER — FLUTICASONE PROPIONATE 50 MCG
2 SPRAY, SUSPENSION (ML) NASAL DAILY
Qty: 18.2 ML | Refills: 3 | Status: SHIPPED | OUTPATIENT
Start: 2021-11-17 | End: 2022-02-20 | Stop reason: SDUPTHER

## 2021-11-17 RX ORDER — PANTOPRAZOLE SODIUM 40 MG/1
40 TABLET, DELAYED RELEASE ORAL DAILY
Qty: 90 TABLET | Refills: 0 | Status: ON HOLD | OUTPATIENT
Start: 2021-11-17 | End: 2022-01-19 | Stop reason: DRUGHIGH

## 2021-11-17 RX ORDER — TIZANIDINE 4 MG/1
TABLET ORAL
Qty: 30 TABLET | Refills: 0 | Status: ON HOLD | OUTPATIENT
Start: 2021-11-17 | End: 2022-02-05 | Stop reason: SDUPTHER

## 2021-11-18 ENCOUNTER — PATIENT MESSAGE (OUTPATIENT)
Dept: ENDOSCOPY | Facility: HOSPITAL | Age: 46
End: 2021-11-18
Payer: COMMERCIAL

## 2021-11-18 DIAGNOSIS — Z12.2 SCREENING FOR MALIGNANT NEOPLASM OF RESPIRATORY ORGAN: Primary | ICD-10-CM

## 2021-11-18 RX ORDER — SODIUM, POTASSIUM,MAG SULFATES 17.5-3.13G
4 SOLUTION, RECONSTITUTED, ORAL ORAL DAILY
Qty: 2 KIT | Refills: 0 | Status: SHIPPED | OUTPATIENT
Start: 2021-11-18 | End: 2021-11-20

## 2021-11-29 LAB
C TRACH DNA SPEC QL NAA+PROBE: NOT DETECTED
N GONORRHOEA DNA SPEC QL NAA+PROBE: NOT DETECTED

## 2021-11-30 ENCOUNTER — HOSPITAL ENCOUNTER (OUTPATIENT)
Dept: RADIOLOGY | Facility: HOSPITAL | Age: 46
Discharge: HOME | End: 2021-11-30
Attending: INTERNAL MEDICINE
Payer: COMMERCIAL

## 2021-11-30 DIAGNOSIS — K59.09 CONSTIPATION, CHRONIC: ICD-10-CM

## 2021-11-30 DIAGNOSIS — K21.9 GASTROESOPHAGEAL REFLUX DISEASE, UNSPECIFIED WHETHER ESOPHAGITIS PRESENT: ICD-10-CM

## 2021-11-30 DIAGNOSIS — R10.13 EPIGASTRIC PAIN: ICD-10-CM

## 2021-11-30 DIAGNOSIS — R11.0 NAUSEA: ICD-10-CM

## 2021-11-30 DIAGNOSIS — Z79.899 ENCOUNTER FOR MONITORING LONG-TERM PROTON PUMP INHIBITOR THERAPY: ICD-10-CM

## 2021-11-30 DIAGNOSIS — Z51.81 ENCOUNTER FOR MONITORING LONG-TERM PROTON PUMP INHIBITOR THERAPY: ICD-10-CM

## 2021-11-30 DIAGNOSIS — D64.9 LOW HEMOGLOBIN: ICD-10-CM

## 2021-11-30 PROCEDURE — 74160 CT ABDOMEN WITH CONTRAST: ICD-10-PCS | Mod: 26,,, | Performed by: RADIOLOGY

## 2021-11-30 PROCEDURE — 25500020 PHARM REV CODE 255: Performed by: INTERNAL MEDICINE

## 2021-11-30 PROCEDURE — 76705 US ABDOMEN LIMITED: ICD-10-PCS | Mod: 26,,, | Performed by: RADIOLOGY

## 2021-11-30 PROCEDURE — 76705 ECHO EXAM OF ABDOMEN: CPT | Mod: TC

## 2021-11-30 PROCEDURE — 76705 ECHO EXAM OF ABDOMEN: CPT | Mod: 26,,, | Performed by: RADIOLOGY

## 2021-11-30 PROCEDURE — 74160 CT ABDOMEN W/CONTRAST: CPT | Mod: TC

## 2021-11-30 PROCEDURE — 74160 CT ABDOMEN W/CONTRAST: CPT | Mod: 26,,, | Performed by: RADIOLOGY

## 2021-11-30 RX ADMIN — IOHEXOL 100 ML: 350 INJECTION, SOLUTION INTRAVENOUS at 10:11

## 2021-12-06 ENCOUNTER — PATIENT MESSAGE (OUTPATIENT)
Dept: ENDOSCOPY | Facility: HOSPITAL | Age: 46
End: 2021-12-06
Payer: COMMERCIAL

## 2021-12-12 DIAGNOSIS — K80.20 CALCULUS OF GALLBLADDER WITHOUT CHOLECYSTITIS WITHOUT OBSTRUCTION: Primary | ICD-10-CM

## 2021-12-12 DIAGNOSIS — R10.11 POSTPRANDIAL ABDOMINAL PAIN IN RIGHT UPPER QUADRANT: ICD-10-CM

## 2021-12-14 ENCOUNTER — TELEPHONE (OUTPATIENT)
Dept: GASTROENTEROLOGY | Facility: CLINIC | Age: 46
End: 2021-12-14
Payer: COMMERCIAL

## 2021-12-23 ENCOUNTER — PATIENT OUTREACH (OUTPATIENT)
Dept: ADMINISTRATIVE | Facility: OTHER | Age: 46
End: 2021-12-23
Payer: COMMERCIAL

## 2021-12-29 ENCOUNTER — HOSPITAL ENCOUNTER (OUTPATIENT)
Dept: CARDIOLOGY | Facility: CLINIC | Age: 46
Discharge: HOME | End: 2021-12-29
Payer: COMMERCIAL

## 2021-12-29 ENCOUNTER — OFFICE VISIT (OUTPATIENT)
Dept: SURGERY | Facility: CLINIC | Age: 46
End: 2021-12-29
Payer: COMMERCIAL

## 2021-12-29 ENCOUNTER — LAB VISIT (OUTPATIENT)
Dept: LAB | Facility: HOSPITAL | Age: 46
End: 2021-12-29
Attending: SURGERY
Payer: COMMERCIAL

## 2021-12-29 VITALS
WEIGHT: 203.5 LBS | SYSTOLIC BLOOD PRESSURE: 162 MMHG | BODY MASS INDEX: 32.71 KG/M2 | HEART RATE: 77 BPM | DIASTOLIC BLOOD PRESSURE: 90 MMHG | HEIGHT: 66 IN

## 2021-12-29 DIAGNOSIS — K80.20 CALCULUS OF GALLBLADDER WITHOUT CHOLECYSTITIS WITHOUT OBSTRUCTION: ICD-10-CM

## 2021-12-29 DIAGNOSIS — R10.11 POSTPRANDIAL ABDOMINAL PAIN IN RIGHT UPPER QUADRANT: ICD-10-CM

## 2021-12-29 DIAGNOSIS — K21.9 GASTROESOPHAGEAL REFLUX DISEASE, UNSPECIFIED WHETHER ESOPHAGITIS PRESENT: ICD-10-CM

## 2021-12-29 DIAGNOSIS — K80.20 CALCULUS OF GALLBLADDER WITHOUT CHOLECYSTITIS WITHOUT OBSTRUCTION: Primary | ICD-10-CM

## 2021-12-29 LAB
ALBUMIN SERPL BCP-MCNC: 4.3 G/DL (ref 3.5–5.2)
ALP SERPL-CCNC: 66 U/L (ref 55–135)
ALT SERPL W/O P-5'-P-CCNC: 14 U/L (ref 10–44)
ANION GAP SERPL CALC-SCNC: 5 MMOL/L (ref 8–16)
AST SERPL-CCNC: 19 U/L (ref 10–40)
BASOPHILS # BLD AUTO: 0.05 K/UL (ref 0–0.2)
BASOPHILS NFR BLD: 1.2 % (ref 0–1.9)
BILIRUB SERPL-MCNC: 0.6 MG/DL (ref 0.1–1)
BUN SERPL-MCNC: 9 MG/DL (ref 6–20)
CALCIUM SERPL-MCNC: 9.6 MG/DL (ref 8.7–10.5)
CHLORIDE SERPL-SCNC: 105 MMOL/L (ref 95–110)
CO2 SERPL-SCNC: 27 MMOL/L (ref 23–29)
CREAT SERPL-MCNC: 1 MG/DL (ref 0.5–1.4)
DIFFERENTIAL METHOD: ABNORMAL
EOSINOPHIL # BLD AUTO: 0.1 K/UL (ref 0–0.5)
EOSINOPHIL NFR BLD: 1.2 % (ref 0–8)
ERYTHROCYTE [DISTWIDTH] IN BLOOD BY AUTOMATED COUNT: 13.8 % (ref 11.5–14.5)
EST. GFR  (AFRICAN AMERICAN): >60 ML/MIN/1.73 M^2
EST. GFR  (NON AFRICAN AMERICAN): >60 ML/MIN/1.73 M^2
GLUCOSE SERPL-MCNC: 83 MG/DL (ref 70–110)
HCT VFR BLD AUTO: 43.1 % (ref 37–48.5)
HGB BLD-MCNC: 13.4 G/DL (ref 12–16)
IMM GRANULOCYTES # BLD AUTO: 0.01 K/UL (ref 0–0.04)
IMM GRANULOCYTES NFR BLD AUTO: 0.2 % (ref 0–0.5)
LYMPHOCYTES # BLD AUTO: 1.6 K/UL (ref 1–4.8)
LYMPHOCYTES NFR BLD: 37.6 % (ref 18–48)
MCH RBC QN AUTO: 28 PG (ref 27–31)
MCHC RBC AUTO-ENTMCNC: 31.1 G/DL (ref 32–36)
MCV RBC AUTO: 90 FL (ref 82–98)
MONOCYTES # BLD AUTO: 0.4 K/UL (ref 0.3–1)
MONOCYTES NFR BLD: 8.3 % (ref 4–15)
NEUTROPHILS # BLD AUTO: 2.2 K/UL (ref 1.8–7.7)
NEUTROPHILS NFR BLD: 51.5 % (ref 38–73)
NRBC BLD-RTO: 0 /100 WBC
PLATELET # BLD AUTO: 336 K/UL (ref 150–450)
PMV BLD AUTO: 10.8 FL (ref 9.2–12.9)
POTASSIUM SERPL-SCNC: 4.1 MMOL/L (ref 3.5–5.1)
PROT SERPL-MCNC: 8.4 G/DL (ref 6–8.4)
RBC # BLD AUTO: 4.78 M/UL (ref 4–5.4)
SODIUM SERPL-SCNC: 137 MMOL/L (ref 136–145)
WBC # BLD AUTO: 4.2 K/UL (ref 3.9–12.7)

## 2021-12-29 PROCEDURE — 93005 EKG 12-LEAD: ICD-10-PCS | Mod: S$GLB,,, | Performed by: SURGERY

## 2021-12-29 PROCEDURE — 3077F SYST BP >= 140 MM HG: CPT | Mod: CPTII,S$GLB,, | Performed by: SURGERY

## 2021-12-29 PROCEDURE — 3008F BODY MASS INDEX DOCD: CPT | Mod: CPTII,S$GLB,, | Performed by: SURGERY

## 2021-12-29 PROCEDURE — 3077F PR MOST RECENT SYSTOLIC BLOOD PRESSURE >= 140 MM HG: ICD-10-PCS | Mod: CPTII,S$GLB,, | Performed by: SURGERY

## 2021-12-29 PROCEDURE — 1159F PR MEDICATION LIST DOCUMENTED IN MEDICAL RECORD: ICD-10-PCS | Mod: CPTII,S$GLB,, | Performed by: SURGERY

## 2021-12-29 PROCEDURE — 93010 EKG 12-LEAD: ICD-10-PCS | Mod: S$GLB,,, | Performed by: INTERNAL MEDICINE

## 2021-12-29 PROCEDURE — 99204 OFFICE O/P NEW MOD 45 MIN: CPT | Mod: S$GLB,,, | Performed by: SURGERY

## 2021-12-29 PROCEDURE — 1159F MED LIST DOCD IN RCRD: CPT | Mod: CPTII,S$GLB,, | Performed by: SURGERY

## 2021-12-29 PROCEDURE — 1160F PR REVIEW ALL MEDS BY PRESCRIBER/CLIN PHARMACIST DOCUMENTED: ICD-10-PCS | Mod: CPTII,S$GLB,, | Performed by: SURGERY

## 2021-12-29 PROCEDURE — 80053 COMPREHEN METABOLIC PANEL: CPT | Performed by: SURGERY

## 2021-12-29 PROCEDURE — 99999 PR PBB SHADOW E&M-EST. PATIENT-LVL IV: ICD-10-PCS | Mod: PBBFAC,,, | Performed by: SURGERY

## 2021-12-29 PROCEDURE — 36415 COLL VENOUS BLD VENIPUNCTURE: CPT | Performed by: SURGERY

## 2021-12-29 PROCEDURE — 93010 ELECTROCARDIOGRAM REPORT: CPT | Mod: S$GLB,,, | Performed by: INTERNAL MEDICINE

## 2021-12-29 PROCEDURE — 85025 COMPLETE CBC W/AUTO DIFF WBC: CPT | Performed by: SURGERY

## 2021-12-29 PROCEDURE — 1160F RVW MEDS BY RX/DR IN RCRD: CPT | Mod: CPTII,S$GLB,, | Performed by: SURGERY

## 2021-12-29 PROCEDURE — 93005 ELECTROCARDIOGRAM TRACING: CPT | Mod: S$GLB,,, | Performed by: SURGERY

## 2021-12-29 PROCEDURE — 3080F DIAST BP >= 90 MM HG: CPT | Mod: CPTII,S$GLB,, | Performed by: SURGERY

## 2021-12-29 PROCEDURE — 3080F PR MOST RECENT DIASTOLIC BLOOD PRESSURE >= 90 MM HG: ICD-10-PCS | Mod: CPTII,S$GLB,, | Performed by: SURGERY

## 2021-12-29 PROCEDURE — 99999 PR PBB SHADOW E&M-EST. PATIENT-LVL IV: CPT | Mod: PBBFAC,,, | Performed by: SURGERY

## 2021-12-29 PROCEDURE — 99204 PR OFFICE/OUTPT VISIT, NEW, LEVL IV, 45-59 MIN: ICD-10-PCS | Mod: S$GLB,,, | Performed by: SURGERY

## 2021-12-29 PROCEDURE — 3008F PR BODY MASS INDEX (BMI) DOCUMENTED: ICD-10-PCS | Mod: CPTII,S$GLB,, | Performed by: SURGERY

## 2021-12-29 RX ORDER — CEFAZOLIN SODIUM 2 G/50ML
2 SOLUTION INTRAVENOUS
Status: CANCELLED | OUTPATIENT
Start: 2021-12-29

## 2022-01-03 ENCOUNTER — LAB VISIT (OUTPATIENT)
Dept: LAB | Facility: HOSPITAL | Age: 47
End: 2022-01-03
Attending: INTERNAL MEDICINE
Payer: COMMERCIAL

## 2022-01-03 DIAGNOSIS — Z13.220 SCREENING FOR LIPOID DISORDERS: ICD-10-CM

## 2022-01-03 DIAGNOSIS — E55.9 VITAMIN D INSUFFICIENCY: ICD-10-CM

## 2022-01-03 DIAGNOSIS — E61.1 IRON DEFICIENCY: ICD-10-CM

## 2022-01-03 DIAGNOSIS — Z00.00 NORMAL PHYSICAL EXAM, ROUTINE: ICD-10-CM

## 2022-01-03 LAB
ALBUMIN SERPL BCP-MCNC: 4 G/DL (ref 3.5–5.2)
ALP SERPL-CCNC: 57 U/L (ref 55–135)
ALT SERPL W/O P-5'-P-CCNC: 9 U/L (ref 10–44)
ANION GAP SERPL CALC-SCNC: 10 MMOL/L (ref 8–16)
AST SERPL-CCNC: 14 U/L (ref 10–40)
BILIRUB SERPL-MCNC: 0.8 MG/DL (ref 0.1–1)
BUN SERPL-MCNC: 8 MG/DL (ref 6–20)
CALCIUM SERPL-MCNC: 9.8 MG/DL (ref 8.7–10.5)
CHLORIDE SERPL-SCNC: 106 MMOL/L (ref 95–110)
CHOLEST SERPL-MCNC: 151 MG/DL (ref 120–199)
CHOLEST/HDLC SERPL: 4.3 {RATIO} (ref 2–5)
CO2 SERPL-SCNC: 20 MMOL/L (ref 23–29)
CREAT SERPL-MCNC: 1 MG/DL (ref 0.5–1.4)
EST. GFR  (AFRICAN AMERICAN): >60 ML/MIN/1.73 M^2
EST. GFR  (NON AFRICAN AMERICAN): >60 ML/MIN/1.73 M^2
FERRITIN SERPL-MCNC: 72 NG/ML (ref 20–300)
GLUCOSE SERPL-MCNC: 92 MG/DL (ref 70–110)
HDLC SERPL-MCNC: 35 MG/DL (ref 40–75)
HDLC SERPL: 23.2 % (ref 20–50)
IRON SERPL-MCNC: 125 UG/DL (ref 30–160)
LDLC SERPL CALC-MCNC: 97.4 MG/DL (ref 63–159)
MAGNESIUM SERPL-MCNC: 2 MG/DL (ref 1.6–2.6)
NONHDLC SERPL-MCNC: 116 MG/DL
POTASSIUM SERPL-SCNC: 4.1 MMOL/L (ref 3.5–5.1)
PROT SERPL-MCNC: 7.7 G/DL (ref 6–8.4)
SATURATED IRON: 32 % (ref 20–50)
SODIUM SERPL-SCNC: 136 MMOL/L (ref 136–145)
T4 FREE SERPL-MCNC: 0.96 NG/DL (ref 0.71–1.51)
TOTAL IRON BINDING CAPACITY: 389 UG/DL (ref 250–450)
TRANSFERRIN SERPL-MCNC: 263 MG/DL (ref 200–375)
TRIGL SERPL-MCNC: 93 MG/DL (ref 30–150)
TSH SERPL DL<=0.005 MIU/L-ACNC: 1.51 UIU/ML (ref 0.4–4)

## 2022-01-03 PROCEDURE — 36415 COLL VENOUS BLD VENIPUNCTURE: CPT | Mod: PN | Performed by: INTERNAL MEDICINE

## 2022-01-03 PROCEDURE — 84466 ASSAY OF TRANSFERRIN: CPT | Performed by: INTERNAL MEDICINE

## 2022-01-03 PROCEDURE — 80061 LIPID PANEL: CPT | Performed by: INTERNAL MEDICINE

## 2022-01-03 PROCEDURE — 80053 COMPREHEN METABOLIC PANEL: CPT | Performed by: INTERNAL MEDICINE

## 2022-01-03 PROCEDURE — 84443 ASSAY THYROID STIM HORMONE: CPT | Performed by: INTERNAL MEDICINE

## 2022-01-03 PROCEDURE — 84439 ASSAY OF FREE THYROXINE: CPT | Performed by: INTERNAL MEDICINE

## 2022-01-03 PROCEDURE — 82728 ASSAY OF FERRITIN: CPT | Performed by: INTERNAL MEDICINE

## 2022-01-03 PROCEDURE — 85018 HEMOGLOBIN: CPT | Performed by: INTERNAL MEDICINE

## 2022-01-03 PROCEDURE — 83735 ASSAY OF MAGNESIUM: CPT | Performed by: INTERNAL MEDICINE

## 2022-01-04 LAB — HGB BLD-MCNC: 12.5 G/DL (ref 12–16)

## 2022-01-04 NOTE — PROGRESS NOTES
I sent pt a my chart message -  I reviewed your labs.  Your thyroid functions are normal.  Your Total cholesterol looked good.  Your good cholesterol (HDL) was low.  I rec exercise to increase this.  Your Magnesium was normal. Your kidney and liver functions looked good. No further recommendations at this time.    Dr. CONTRERAS

## 2022-01-11 ENCOUNTER — PATIENT MESSAGE (OUTPATIENT)
Dept: ENDOSCOPY | Facility: HOSPITAL | Age: 47
End: 2022-01-11
Payer: COMMERCIAL

## 2022-01-12 DIAGNOSIS — Z01.818 PRE-OP TESTING: Primary | ICD-10-CM

## 2022-01-15 ENCOUNTER — PATIENT MESSAGE (OUTPATIENT)
Dept: ENDOSCOPY | Facility: HOSPITAL | Age: 47
End: 2022-01-15
Payer: COMMERCIAL

## 2022-01-16 ENCOUNTER — LAB VISIT (OUTPATIENT)
Dept: PRIMARY CARE CLINIC | Facility: CLINIC | Age: 47
End: 2022-01-16
Payer: COMMERCIAL

## 2022-01-16 DIAGNOSIS — Z01.818 PRE-OP TESTING: ICD-10-CM

## 2022-01-16 PROCEDURE — U0005 INFEC AGEN DETEC AMPLI PROBE: HCPCS | Performed by: FAMILY MEDICINE

## 2022-01-16 PROCEDURE — U0003 INFECTIOUS AGENT DETECTION BY NUCLEIC ACID (DNA OR RNA); SEVERE ACUTE RESPIRATORY SYNDROME CORONAVIRUS 2 (SARS-COV-2) (CORONAVIRUS DISEASE [COVID-19]), AMPLIFIED PROBE TECHNIQUE, MAKING USE OF HIGH THROUGHPUT TECHNOLOGIES AS DESCRIBED BY CMS-2020-01-R: HCPCS | Performed by: FAMILY MEDICINE

## 2022-01-17 LAB
SARS-COV-2 RNA RESP QL NAA+PROBE: NOT DETECTED
SARS-COV-2- CYCLE NUMBER: NORMAL

## 2022-01-19 ENCOUNTER — ANESTHESIA (OUTPATIENT)
Dept: ENDOSCOPY | Facility: HOSPITAL | Age: 47
End: 2022-01-19
Payer: COMMERCIAL

## 2022-01-19 ENCOUNTER — ANESTHESIA EVENT (OUTPATIENT)
Dept: ENDOSCOPY | Facility: HOSPITAL | Age: 47
End: 2022-01-19
Payer: COMMERCIAL

## 2022-01-19 ENCOUNTER — HOSPITAL ENCOUNTER (OUTPATIENT)
Facility: HOSPITAL | Age: 47
Discharge: HOME | End: 2022-01-19
Attending: INTERNAL MEDICINE | Admitting: INTERNAL MEDICINE
Payer: COMMERCIAL

## 2022-01-19 VITALS
BODY MASS INDEX: 32.95 KG/M2 | HEART RATE: 63 BPM | OXYGEN SATURATION: 98 % | SYSTOLIC BLOOD PRESSURE: 150 MMHG | DIASTOLIC BLOOD PRESSURE: 91 MMHG | RESPIRATION RATE: 18 BRPM | TEMPERATURE: 98 F | WEIGHT: 205 LBS | HEIGHT: 66 IN

## 2022-01-19 DIAGNOSIS — R10.13 EPIGASTRIC ABDOMINAL PAIN: ICD-10-CM

## 2022-01-19 DIAGNOSIS — R11.0 NAUSEA: Primary | ICD-10-CM

## 2022-01-19 DIAGNOSIS — K21.9 GASTROESOPHAGEAL REFLUX DISEASE, UNSPECIFIED WHETHER ESOPHAGITIS PRESENT: ICD-10-CM

## 2022-01-19 DIAGNOSIS — K44.9 HIATAL HERNIA: ICD-10-CM

## 2022-01-19 DIAGNOSIS — K22.10 EROSIVE ESOPHAGITIS: ICD-10-CM

## 2022-01-19 LAB
B-HCG UR QL: NEGATIVE
CTP QC/QA: YES

## 2022-01-19 PROCEDURE — 25000003 PHARM REV CODE 250: Performed by: NURSE ANESTHETIST, CERTIFIED REGISTERED

## 2022-01-19 PROCEDURE — 88305 TISSUE EXAM BY PATHOLOGIST: CPT | Performed by: PATHOLOGY

## 2022-01-19 PROCEDURE — 43239 PR EGD, FLEX, W/BIOPSY, SGL/MULTI: ICD-10-PCS | Mod: 51,,, | Performed by: INTERNAL MEDICINE

## 2022-01-19 PROCEDURE — 43239 EGD BIOPSY SINGLE/MULTIPLE: CPT | Performed by: INTERNAL MEDICINE

## 2022-01-19 PROCEDURE — 45380 COLONOSCOPY AND BIOPSY: CPT | Mod: ,,, | Performed by: INTERNAL MEDICINE

## 2022-01-19 PROCEDURE — 63600175 PHARM REV CODE 636 W HCPCS: Performed by: NURSE ANESTHETIST, CERTIFIED REGISTERED

## 2022-01-19 PROCEDURE — 45380 COLONOSCOPY AND BIOPSY: CPT | Performed by: INTERNAL MEDICINE

## 2022-01-19 PROCEDURE — 45380 PR COLONOSCOPY,BIOPSY: ICD-10-PCS | Mod: ,,, | Performed by: INTERNAL MEDICINE

## 2022-01-19 PROCEDURE — 88305 TISSUE EXAM BY PATHOLOGIST: CPT | Mod: 26,,, | Performed by: PATHOLOGY

## 2022-01-19 PROCEDURE — 43239 EGD BIOPSY SINGLE/MULTIPLE: CPT | Mod: 51,,, | Performed by: INTERNAL MEDICINE

## 2022-01-19 PROCEDURE — 27201012 HC FORCEPS, HOT/COLD, DISP: Performed by: INTERNAL MEDICINE

## 2022-01-19 PROCEDURE — E9220 PRA ENDO ANESTHESIA: ICD-10-PCS | Mod: ,,, | Performed by: NURSE ANESTHETIST, CERTIFIED REGISTERED

## 2022-01-19 PROCEDURE — 81025 URINE PREGNANCY TEST: CPT | Performed by: INTERNAL MEDICINE

## 2022-01-19 PROCEDURE — 88305 TISSUE EXAM BY PATHOLOGIST: ICD-10-PCS | Mod: 26,,, | Performed by: PATHOLOGY

## 2022-01-19 PROCEDURE — 37000009 HC ANESTHESIA EA ADD 15 MINS: Performed by: INTERNAL MEDICINE

## 2022-01-19 PROCEDURE — E9220 PRA ENDO ANESTHESIA: HCPCS | Mod: ,,, | Performed by: NURSE ANESTHETIST, CERTIFIED REGISTERED

## 2022-01-19 PROCEDURE — 37000008 HC ANESTHESIA 1ST 15 MINUTES: Performed by: INTERNAL MEDICINE

## 2022-01-19 RX ORDER — LIDOCAINE HYDROCHLORIDE 20 MG/ML
INJECTION INTRAVENOUS
Status: DISCONTINUED | OUTPATIENT
Start: 2022-01-19 | End: 2022-01-19

## 2022-01-19 RX ORDER — PROPOFOL 10 MG/ML
VIAL (ML) INTRAVENOUS CONTINUOUS PRN
Status: DISCONTINUED | OUTPATIENT
Start: 2022-01-19 | End: 2022-01-19

## 2022-01-19 RX ORDER — PANTOPRAZOLE SODIUM 40 MG/1
40 TABLET, DELAYED RELEASE ORAL EVERY 12 HOURS
Qty: 180 TABLET | Refills: 0 | Status: SHIPPED | OUTPATIENT
Start: 2022-01-19 | End: 2022-02-20 | Stop reason: SDUPTHER

## 2022-01-19 RX ORDER — SODIUM CHLORIDE 9 MG/ML
INJECTION, SOLUTION INTRAVENOUS CONTINUOUS
Status: DISCONTINUED | OUTPATIENT
Start: 2022-01-19 | End: 2022-01-19 | Stop reason: HOSPADM

## 2022-01-19 RX ADMIN — PROPOFOL 185 MCG/KG/MIN: 10 INJECTION, EMULSION INTRAVENOUS at 03:01

## 2022-01-19 RX ADMIN — GLYCOPYRROLATE 0.2 MG: 0.2 INJECTION, SOLUTION INTRAMUSCULAR; INTRAVITREAL at 03:01

## 2022-01-19 RX ADMIN — SODIUM CHLORIDE: 0.9 INJECTION, SOLUTION INTRAVENOUS at 03:01

## 2022-01-19 RX ADMIN — LIDOCAINE HYDROCHLORIDE 75 MG: 20 INJECTION, SOLUTION INTRAVENOUS at 03:01

## 2022-01-19 NOTE — DISCHARGE INSTRUCTIONS
"Patient Education       Diverticulosis Discharge Instructions   About this topic   Diverticulosis is a problem of the large bowel or colon. The wall of the bowel becomes weak and pushes outward. They form balloon-like pouches called diverticula or "tics." When you have hard stool, you strain to have a bowel movement. This raises the pressure in the bowel and causes pouches or bulges to form. Most often, they do not cause a problem. If they become infected, you have diverticulitis. If you have both bleeding and infection it is diverticular disease.     What care is needed at home?   · Ask your doctor what you need to do when you go home. Make sure you ask questions if you do not understand what the doctor says.  · Eat more whole grains, vegetables, and fruits.  · Do not wait to have a bowel movement. Go as soon as you have the urge.  · Drink 8 to 10 glasses of water each day. Talk to your doctor if you are drinking less fluids due to a health problem.  · Be active. Walk, garden, or do something active for 30 minutes or more on most days of the week.  What follow-up care is needed?   Your doctor may ask you to make visits to the office to check on your progress. Be sure to keep these visits.  What drugs may be needed?   Most often with diverticulosis you will not need to take any drugs.  Will physical activity be limited?   When you are in pain, you may need to rest in bed. To ease the pain, use a heat compress on your belly. This should last only for a few days.  What changes to diet are needed?   Talk to your doctor about any changes you need to make to your diet.  · You do not need to avoid seeds, nuts, corn, or other similar foods.   · You will need to eat food rich in fiber and drink more water.  ? Eat 5 or more servings of fresh fruits and vegetables every day.  ? Eat 6 or more servings of whole-wheat grain breads and cereals.  ? Try to get 25 to 30 grams of fiber every day. Read the labels to learn how much " fiber is in foods.   · Do not drink coffee, tea, or beer, wine, and mixed drinks (alcohol).  What problems could happen?   You may develop diverticulitis, which may cause:  · Pockets or pouches in your bowel may be infected or filled with pus.  · Hole or tear in your bowel  · Part of your bowel to become narrow  · You to need surgery  What can be done to prevent this health problem?   The best way to keep from having diverticulosis is to keep your bowel movements soft and normal. To keep more pouches from forming:  · Talk with your doctor about adding an over-the-counter (OTC) fiber product to keep your stools soft.  · Limit how much pain drugs you take. Overuse of some pain drugs can cause hard stools; talk with your doctor.  When do I need to call the doctor?   · Signs of infection. These include a fever of 100.4°F (38°C) or higher, chills.  · Mild pain or cramping in the lower part of the belly  · A feeling of bloating in the belly  · Belly pain that gets worse  · Blood in your stool  · Upset stomach or throwing up  · Stools get too loose or too hard  · Long-term hard stools  Teach Back: Helping You Understand   The Teach Back Method helps you understand the information we are giving you. After you talk with the staff, tell them in your own words what you learned. This helps to make sure the staff has described each thing clearly. It also helps to explain things that may have been confusing. Before going home, make sure you are able to do these:  · I can tell you about my condition.  · I can tell you what changes I need to make with my diet or drugs.  · I can tell you what I will do if I have pain or cramping in my lower belly or I have more belly pain.  Where can I learn more?   FamilyDoctor.org  http://familydoctor.org/familydoctor/en/diseases-conditions/diverticular-disease.html   [facility]  https://www.nhs.[place]/conditions/diverticular-disease-and-diverticulitis/   Last Reviewed Date   2021-04-13  Consumer Information  Use and Disclaimer   This information is not specific medical advice and does not replace information you receive from your health care provider. This is only a brief summary of general information. It does NOT include all information about conditions, illnesses, injuries, tests, procedures, treatments, therapies, discharge instructions or life-style choices that may apply to you. You must talk with your health care provider for complete information about your health and treatment options. This information should not be used to decide whether or not to accept your health care providers advice, instructions or recommendations. Only your health care provider has the knowledge and training to provide advice that is right for you.  Copyright   Copyright © 2021 [employer] and its affiliates and/or licensors. All rights reserved.  Patient Education       Hiatal Hernia Discharge Instructions   About this topic   Hiatal hernia is when part of the stomach is up in the chest. Normally, the stomach sits below the diaphragm, the muscle that divides the chest and abdomen. The diaphragm has a small opening in it to let the esophagus, or swallowing tube, pass through. With a hiatal hernia, the stomach pushes up through that hole too.     What care is needed at home?   · Ask your doctor what you need to do when you go home. Make sure you ask questions if you do not understand what the doctor says. This way you will know what you need to do.  · Do not wear tight clothing over your belly. Wear clothes and belts that are loose around your waist.  · Raise the head of the bed up 6 to 8 inches (15 to 20 cm) or use a wedge pillow. This position may keep stomach acid from getting into the esophagus.  What follow-up care is needed?   Your doctor may ask you to make visits to the office to check on your progress. Be sure to keep these visits.   What drugs may be needed?   The doctor may order drugs to:  · Lower stomach acid  · Stop  heartburn  · Help with pain  · Soften stools  Will physical activity be limited?   · You may have to limit your activity. Talk to the doctor about the right amount of activity for you. Avoid sports that involve lifting heavy things and bending. This can cause stress on your belly.  · Avoid straining. Having trouble passing stool or hard stools may make your hernia worse.  What changes to diet are needed?   · Avoid large, heavy meals. Eat a few small meals throughout the day.  · Avoid drinking too much with meals.  · Wait at least 2 to 3 hours after eating before lying down or bending over.  · Avoid foods that may upset the stomach. Some people have an upset belly after:  ? Coffee  ? Citrus fruits and juices  ? Tomato products  ? Hot peppers  ? Fizzy drinks  ? Chocolate  ? Peppermint  ? Fatty foods  ? Beer, wine, and mixed drinks (alcohol)  What problems could happen?   · Lower blood levels of iron  · No blood flow to hernia  What can be done to prevent this health problem?   · Keep a healthy weight.  · Lose weight if you are overweight.  · Avoid smoking. Ask for help if it's hard to quit.  · Avoid foods that may upset the stomach.  · Dont overeat. Eat smaller meals.  · Try to eat at least 2 to 3 hours before laying down. Avoid eating before sleeping at night.  When do I need to call the doctor?   · Heartburn that is worse when bending over or lying down  · Swallowing problems  Teach Back: Helping You Understand   The Teach Back Method helps you understand the information we are giving you. After you talk with the staff, tell them in your own words what you learned. This helps to make sure the staff has described each thing clearly. It also helps to explain things that may have been confusing. Before going home, make sure you can do these:  · I can tell you about my condition.  · I can tell you what changes I need to make with my diet or drugs.  · I can tell you what I will do if I have more heartburn when I am  bending over or lying down.  Where can I learn more?   [facility] Choices  http://www.nhs.uk/conditions/hernia-hiatus/pages/introduction.aspx   Last Reviewed Date   2021-09-28  Consumer Information Use and Disclaimer   This information is not specific medical advice and does not replace information you receive from your health care provider. This is only a brief summary of general information. It does NOT include all information about conditions, illnesses, injuries, tests, procedures, treatments, therapies, discharge instructions or life-style choices that may apply to you. You must talk with your health care provider for complete information about your health and treatment options. This information should not be used to decide whether or not to accept your health care providers advice, instructions or recommendations. Only your health care provider has the knowledge and training to provide advice that is right for you.  Copyright   Copyright © 2021 [employer], [employer] and its affiliates and/or licensors. All rights reserved.  Patient Education       Upper GI Endoscopy Discharge Instructions   About this topic   This procedure is done to view your upper gastrointestinal (GI) tract. This includes your throat and food pipe (esophagus). It also includes your stomach and the first part of the small bowel. Some people have this test for problems like coughing or throwing up blood. Other people may be having bad belly pain or blood in their stool. You may be having trouble swallowing or problems with acid reflux.  Doctors often use this test to look for problems like:  · Ulcers  · Cancer or tumor growths  · Internal bleeding  · Swelling  · Inflammation  · Infection  · Irby's esophagus  · Gastroesophageal reflux disease or GERD  · Swallowing problems     What care is needed at home?   · Ask your doctor what you need to do when you go home. Make sure you ask questions if you do not understand what the doctor says. This way you  will know what you need to do.  · Your throat may feel sore. Your belly may also feel bloated. Your doctor may give you drugs to help with pain.  · Talk to your doctor about when it is safe for you to go back to eating your normal diet and taking your drugs. You can drink fluid once the numbing drugs in your throat wear off.  What follow-up care is needed?   · Your doctor may ask you to make visits to the office to check on your progress. Be sure to keep these visits.  · The results of this test may help your doctor understand what kind of problem you have with your upper GI tract. Together you can make a plan for more care.  What drugs may be needed?   The doctor may order drugs to:  · Help with pain  · Decrease the acid in your stomach  Will physical activity be limited?   You may need to limit your activity for the rest of the day. After that, you will likely be able to go back to your normal activities.  What changes to diet are needed?   Soft foods like pudding or soups may be easier to eat at first. Ask your doctor if you need to make any changes to your diet.  What problems could happen?   · Painful swallowing  · Upset stomach  · Injury to food pipe   · Throwing up  · Tear in the esophagus  When do I need to call the doctor?   · Signs of infection. These include a fever of 100.4°F (38°C) or higher, chills.  · Very bad belly pain  · Throwing up blood  · Your belly is hard and swollen  · Trouble swallowing or breathing  · Upset stomach and throwing up  · Bloody or black tarry stools  · Cough, shortness of breath, or chest pain  · A crunching feeling under the skin in your neck  · You are not feeling better in 2 to 3 days or you are feeling worse  Teach Back: Helping You Understand   The Teach Back Method helps you understand the information we are giving you. After you talk with the staff, tell them in your own words what you learned. This helps to make sure the staff has described each thing clearly. It also  helps to explain things that may have been confusing. Before going home, make sure you can do these:  · I can tell you about my procedure.  · I can tell you what changes I need to make with my diet or drugs.  · I can tell you what I will do if I have very bad belly pain, throwing up blood, or my belly is hard and swollen.  Where can I learn more?   American [employer] Association  https://www.gastro.org/practice-guidance/gi-patient-center/topic/upper-gi-endoscopy   Last Reviewed Date   2021-10-05  Consumer Information Use and Disclaimer   This information is not specific medical advice and does not replace information you receive from your health care provider. This is only a brief summary of general information. It does NOT include all information about conditions, illnesses, injuries, tests, procedures, treatments, therapies, discharge instructions or life-style choices that may apply to you. You must talk with your health care provider for complete information about your health and treatment options. This information should not be used to decide whether or not to accept your health care providers advice, instructions or recommendations. Only your health care provider has the knowledge and training to provide advice that is right for you.  Copyright   Copyright © 2021 [employer], [employer]. and its affiliates and/or licensors. All rights reserved.  Patient Education       Colonoscopy Discharge Instructions   About this topic   Colonoscopy is done so your doctor can see the inside of your large intestines, also called your colon, and your rectum. It uses a lighted tube called a scope, which has a tiny camera that can be moved through the large intestine. This may be done to:  · Screen for colon cancer or polyps  · Look for the source of rectal bleeding  · Find the cause of changes in your bowel movement  · Find the cause of belly or rectal pain  · Check results from other tests  · Check your response to treatment  for other diseases     What care is needed at home?   · Ask your doctor what you need to do when you go home. Make sure you ask questions if you do not understand what the doctor says. This way you will know what you need to do.  · Rest. Do not drive the rest of the day. Do not sign any important papers or make any important decisions for the next 24 hours.  · You may feel groggy. Take extra care when moving about.  · You may have gas or mild cramping. This is normal.  · A small amount of bleeding may happen during the first few days after your procedure.  · Start back on your normal diet unless you need some changes in your diet.  What follow-up care is needed?   · Your doctor will talk to you about your test results. Your doctor may ask you to make visits to the office to check on your progress. Be sure to keep these visits.  · Ask your doctor when you need to have another colonoscopy. The amount of time between tests is based on what was found during this test. People with no polyps may be able to wait 10 years to have another colonoscopy. Other people may need to have this test repeated in 1 year because of the kind of polyps that were found in their colon. Ask your doctor when you need to come back.  What drugs may be needed?   Ask your doctor what drugs you will need to take. Take your drugs as told by your doctor.  Will physical activity be limited?   Physical activities may be limited for a short time. Rest after the procedure. You may go back to your normal activities within a day or so.  What changes to diet are needed?   · Drink 6 to 8 glasses of water each day.  · Eat food rich in fiber like fresh fruits and vegetables.  What problems could happen?   · Tear inside your colon  · Bleeding can happen up to a few days afterwards  When do I need to call the doctor?   · Fever of 100.4°F (38°C) or higher, chills  · Bleeding during bowel movements (1 teaspoon (5 mL) or more) or maroon stool  · Throwing up more  than 3 times in the next 48 hours  · Feeling dizzy  · Feeling weak  · Belly pain that is getting worse  · Not able to have a bowel movement for more than 2 days  · Hard swollen belly  Teach Back: Helping You Understand   The Teach Back Method helps you understand the information we are giving you. After you talk with the staff, tell them in your own words what you learned. This helps to make sure the staff has described each thing clearly. It also helps to explain things that may have been confusing. Before going home, make sure you can do these:  · I can tell you about my procedure.  · I can tell you what signs are normal after my procedure.  · I can tell you what I will do if I throw up more than 3 times in the next 48 hours or I have more belly pain.  Where can I learn more?   American Cancer Society  https://www.cancer.org/cancer/colon-rectal-cancer/fvzyjzkod-olqfiznok-ilntegq/ditcvoezz-npzeq-frks.html   American Society of Clinical Oncology  https://www.cancer.net/navigating-cancer-care/diagnosing-cancer/tests-and-procedures/colonoscopy   Last Reviewed Date   2021-03-10  Consumer Information Use and Disclaimer   This information is not specific medical advice and does not replace information you receive from your health care provider. This is only a brief summary of general information. It does NOT include all information about conditions, illnesses, injuries, tests, procedures, treatments, therapies, discharge instructions or life-style choices that may apply to you. You must talk with your health care provider for complete information about your health and treatment options. This information should not be used to decide whether or not to accept your health care providers advice, instructions or recommendations. Only your health care provider has the knowledge and training to provide advice that is right for you.  Copyright   Copyright © 2021 [employer], [employer]. and its affiliates and/or licensors. All rights  reserved.

## 2022-01-19 NOTE — [employer] PATIENT INSTRUCTIONS
Discharge Summary/Instructions after an Endoscopic Procedure  Patient Name: Ilene Garber  Patient MRN: [id number]  Patient YOB: 1975 Wednesday, January 19, 2022  Cirilo Tate MD  Dear patient,  As a result of recent federal legislation (The Federal Cures Act), you may   receive lab or pathology results from your procedure in your [facility]   account before your physician is able to contact you. Your physician or   their representative will relay the results to you with their   recommendations at their soonest availability.  Thank you,  RESTRICTIONS:  During your procedure today, you received medications for sedation.  These   medications may affect your judgment, balance and coordination.  Therefore,   for 24 hours, you have the following restrictions:   - DO NOT drive a car, operate machinery, make legal/financial decisions,   sign important papers or drink alcohol.    ACTIVITY:  Today: no heavy lifting, straining or running due to procedural   sedation/anesthesia.  The following day: return to full activity including work.  DIET:  Eat and drink normally unless instructed otherwise.     TREATMENT FOR COMMON SIDE EFFECTS:  - Mild abdominal pain, nausea, belching, bloating or excessive gas:  rest,   eat lightly and use a heating pad.  - Sore Throat: treat with throat lozenges and/or gargle with warm salt   water.  - Because air was used during the procedure, expelling large amounts of air   from your rectum or belching is normal.  - If a bowel prep was taken, you may not have a bowel movement for 1-3 days.    This is normal.  SYMPTOMS TO WATCH FOR AND REPORT TO YOUR PHYSICIAN:  1. Abdominal pain or bloating, other than gas cramps.  2. Chest pain.  3. Back pain.  4. Signs of infection such as: chills or fever occurring within 24 hours   after the procedure.  5. Rectal bleeding, which would show as bright red, maroon, or black stools.   (A tablespoon of blood from the rectum is not serious, especially  if   hemorrhoids are present.)  6. Vomiting.  7. Weakness or dizziness.  GO DIRECTLY TO THE NEAREST EMERGENCY ROOM IF YOU HAVE ANY OF THE FOLLOWING:      Difficulty breathing              Chills and/or fever over 101 F   Persistent vomiting and/or vomiting blood   Severe abdominal pain   Severe chest pain   Black, tarry stools   Bleeding- more than one tablespoon   Any other symptom or condition that you feel may need urgent attention  Your doctor recommends these additional instructions:  If any biopsies were taken, your doctors clinic will contact you in 1 to 2   weeks with any results.  - Discharge patient to home.   - Await pathology results.   - Telephone endoscopist for pathology results in 2 weeks.   - Use Protonix (pantoprazole) 40 mg by mouth every 12 hours (Best taken 45   minutes before your first protein meal of the day Breakfast and 45 minutes   before dinner).   - Repeat upper endoscopy in 8 weeks to check healing.   - Return to GI clinic.   - The findings and recommendations were discussed with the patient.  For questions, problems or results please call your physician - Cirilo Tate MD at Work:  [phone].  [facility] [place], EMERGENCY ROOM PHONE NUMBER: [phone]  IF A COMPLICATION OR EMERGENCY SITUATION ARISES AND YOU ARE UNABLE TO REACH   YOUR PHYSICIAN - GO DIRECTLY TO THE EMERGENCY ROOM.  Cirilo Tate MD  1/19/2022 4:46:10 PM  This report has been verified and signed electronically.  Dear patient,  As a result of recent federal legislation (The Federal Cures Act), you may   receive lab or pathology results from your procedure in your [facility]   account before your physician is able to contact you. Your physician or   their representative will relay the results to you with their   recommendations at their soonest availability.  Thank you,  [employer]

## 2022-01-19 NOTE — H&P
[facility]  History & Physical    Subjective:      Chief Complaint/Reason for Admission:     EGD and colonoscopy    Ilene Garber is a 46 y.o. female.    Past Medical History:   Diagnosis Date    Asthma     GERD (gastroesophageal reflux disease)     Hypertension     Ovarian cyst     Rotator cuff tear, right      Past Surgical History:   Procedure Laterality Date    BILATERAL TUBAL LIGATION       SECTION      x 3    LAPAROSCOPY LYSIS OF ADHESIONS  2018     Family History   Problem Relation Age of Onset    COPD Mother     Coronary artery disease Mother         s/p CABG    Hypertension Maternal Grandmother     Diabetes Maternal Grandmother     Breast cancer Neg Hx     Colon cancer Neg Hx     Ovarian cancer Neg Hx     Celiac disease Neg Hx     Cirrhosis Neg Hx     Crohn's disease Neg Hx     Esophageal cancer Neg Hx     Inflammatory bowel disease Neg Hx     Liver cancer Neg Hx     Liver disease Neg Hx     Rectal cancer Neg Hx     Stomach cancer Neg Hx     Ulcerative colitis Neg Hx     Pancreatic cancer Neg Hx     Kidney cancer Neg Hx     Bladder Cancer Neg Hx     Uterine cancer Neg Hx      Social History     Tobacco Use    Smoking status: Never Smoker    Smokeless tobacco: Never Used   Substance Use Topics    Alcohol use: Yes     Comment: Just Friday two [place] Ice teas    Drug use: Never       PTA Medications   Medication Sig    albuterol (PROVENTIL) 5 mg/mL nebulizer solution Inhale 2 puffs into the lungs.    cholecalciferol, vitamin D3, (VITAMIN D3) 50 mcg (2,000 unit) Cap Take 2 capsules (4,000 Units total) by mouth once daily.    ergocalciferol (ERGOCALCIFEROL) 50,000 unit Cap Take 1 capsule (50,000 Units total) by mouth every 7 days. for 12 doses    ferrous gluconate (FERGON) 324 MG tablet Take 1 tablet (324 mg total) by mouth daily with breakfast.    fluticasone propionate (FLONASE) 50 mcg/actuation nasal spray 2 sprays (100 mcg total)  by Each Nostril route once daily.    pantoprazole (PROTONIX) 40 MG tablet Take 1 tablet (40 mg total) by mouth once daily.    spironolactone (ALDACTONE) 25 MG tablet Take 1 tablet (25 mg total) by mouth once daily.    tiZANidine (ZANAFLEX) 4 MG tablet 1 po QHS prn back pain     Review of patient's allergies indicates:  No Known Allergies     Review of Systems   Constitutional: Negative for chills, fever and weight loss.   Respiratory: Negative for shortness of breath.    Cardiovascular: Negative for chest pain.   Gastrointestinal: Positive for constipation and nausea. Negative for abdominal pain.       Objective:      Vital Signs (Most Recent)  Temp: 98.1 °F (36.7 °C) (01/19/22 1415)  Pulse: 85 (01/19/22 1415)  Resp: 14 (01/19/22 1415)  BP: (!) 164/93 (01/19/22 1415)  SpO2: 100 % (01/19/22 1415)    Vital Signs Range (Last 24H):  Temp:  [98.1 °F (36.7 °C)]   Pulse:  [85]   Resp:  [14]   BP: (164)/(93)   SpO2:  [100 %]     Physical Exam  Constitutional:       Appearance: Normal appearance.   Cardiovascular:      Rate and Rhythm: Normal rate.   Pulmonary:      Effort: Pulmonary effort is normal.   Neurological:      Mental Status: She is alert and oriented to person, place, and time.              Assessment:      There are no hospital problems to display for this patient.      Plan:    EGD and colonoscopy.  1. Nausea    2. Gastroesophageal reflux disease, unspecified whether esophagitis present    3. Encounter for monitoring long-term proton pump inhibitor therapy    4. Iron deficiency anemia, unspecified iron deficiency anemia type    5. Epigastric pain    6. Constipation, chronic    7. Low hemoglobin

## 2022-01-19 NOTE — TRANSFER OF CARE
"Anesthesia Transfer of Care Note    Patient: Ilene Garber    Procedure(s) Performed: Procedure(s) (LRB):  EGD (ESOPHAGOGASTRODUODENOSCOPY) (N/A)  COLONOSCOPY (N/A)    Patient location: PACU    Anesthesia Type: general    Transport from OR: Transported from OR on 2-3 L/min O2 by NC with adequate spontaneous ventilation    Post pain: adequate analgesia    Post assessment: no apparent anesthetic complications    Post vital signs: stable    Level of consciousness: awake    Nausea/Vomiting: no nausea/vomiting    Complications: none    Transfer of care protocol was followed      Last vitals:   Visit Vitals  BP (!) 164/93 (BP Location: Left arm, Patient Position: Lying)   Pulse 85   Temp 36.7 °C (98.1 °F) (Temporal)   Resp 14   Ht 5' 6" (1.676 m)   Wt 93 kg (205 lb)   SpO2 100%   Breastfeeding No   BMI 33.09 kg/m²     "

## 2022-01-19 NOTE — [employer] PATIENT INSTRUCTIONS
Discharge Summary/Instructions after an Endoscopic Procedure  Patient Name: Ilene Garber  Patient MRN: [id number]  Patient YOB: 1975 Wednesday, January 19, 2022  Cirilo Tate MD  Dear patient,  As a result of recent federal legislation (The Federal Cures Act), you may   receive lab or pathology results from your procedure in your [facility]   account before your physician is able to contact you. Your physician or   their representative will relay the results to you with their   recommendations at their soonest availability.  Thank you,  RESTRICTIONS:  During your procedure today, you received medications for sedation.  These   medications may affect your judgment, balance and coordination.  Therefore,   for 24 hours, you have the following restrictions:   - DO NOT drive a car, operate machinery, make legal/financial decisions,   sign important papers or drink alcohol.    ACTIVITY:  Today: no heavy lifting, straining or running due to procedural   sedation/anesthesia.  The following day: return to full activity including work.  DIET:  Eat and drink normally unless instructed otherwise.     TREATMENT FOR COMMON SIDE EFFECTS:  - Mild abdominal pain, nausea, belching, bloating or excessive gas:  rest,   eat lightly and use a heating pad.  - Sore Throat: treat with throat lozenges and/or gargle with warm salt   water.  - Because air was used during the procedure, expelling large amounts of air   from your rectum or belching is normal.  - If a bowel prep was taken, you may not have a bowel movement for 1-3 days.    This is normal.  SYMPTOMS TO WATCH FOR AND REPORT TO YOUR PHYSICIAN:  1. Abdominal pain or bloating, other than gas cramps.  2. Chest pain.  3. Back pain.  4. Signs of infection such as: chills or fever occurring within 24 hours   after the procedure.  5. Rectal bleeding, which would show as bright red, maroon, or black stools.   (A tablespoon of blood from the rectum is not serious, especially  if   hemorrhoids are present.)  6. Vomiting.  7. Weakness or dizziness.  GO DIRECTLY TO THE NEAREST EMERGENCY ROOM IF YOU HAVE ANY OF THE FOLLOWING:      Difficulty breathing              Chills and/or fever over 101 F   Persistent vomiting and/or vomiting blood   Severe abdominal pain   Severe chest pain   Black, tarry stools   Bleeding- more than one tablespoon   Any other symptom or condition that you feel may need urgent attention  Your doctor recommends these additional instructions:  If any biopsies were taken, your doctors clinic will contact you in 1 to 2   weeks with any results.  - Discharge patient to home.   - Repeat colonoscopy in 10 years for screening purposes. Sooner is   colonoscopy pathology is abnormal.  - THIS RECOMMENDATION of 10-Years FOR NEXT SCREENING COLONOSCOPY ASSUMES   THAT YOU WILL NOT HAVE HAD OR NOT HAD A FIRST OR SECOND DEGREE RELATIVE/S   WITH COLORECTAL CANCER OR AN ADVANCE COLON ADENOMAS BEFORE THE AGE OF 60   YEARS OF AGE OF THOSE INDIVIDUALS BY THE TIME OF YOUR NEXT SCREENING   COLONOSCOPY.        - Await pathology results.   - Telephone endoscopist for pathology results in 2 weeks.   - Return to GI clinic.   - The findings and recommendations were discussed with the patient.  For questions, problems or results please call your physician - Cirilo Tate MD at Work:  [phone].  [facility], EMERGENCY ROOM PHONE NUMBER: [phone]  IF A COMPLICATION OR EMERGENCY SITUATION ARISES AND YOU ARE UNABLE TO REACH   YOUR PHYSICIAN - GO DIRECTLY TO THE EMERGENCY ROOM.  Cirilo Tate MD  1/19/2022 4:45:04 PM  This report has been verified and signed electronically.  Dear patient,  As a result of recent federal legislation (The Federal Cures Act), you may   receive lab or pathology results from your procedure in your [facility]   account before your physician is able to contact you. Your physician or   their representative will relay the results to you with their    recommendations at their soonest availability.  Thank you,  [employer]ATION

## 2022-01-19 NOTE — ANESTHESIA PREPROCEDURE EVALUATION
Past Medical History:   Diagnosis Date    Asthma     GERD (gastroesophageal reflux disease)     Hypertension     Ovarian cyst     Rotator cuff tear, right                    Past Surgical History:   Procedure Laterality Date    BILATERAL TUBAL LIGATION       SECTION      x 3    LAPAROSCOPY LYSIS OF ADHESIONS  2018  Ilene Garber is a 46 y.o., female.    Anesthesia Evaluation    I have reviewed the Patient Summary Reports.    I have reviewed the Nursing Notes.    I have reviewed the Medications.     Review of Systems      Physical Exam  General:  Well nourished    Airway/Jaw/Neck:  Airway Findings: Mouth Opening: Normal Tongue: Normal  General Airway Assessment: Adult  Mallampati: II  TM Distance: Normal, at least 6 cm     Eyes/Ears/Nose:  Eyes/Ears/Nose Findings:    Dental:  Dental Findings: In tact   Chest/Lungs:  Chest/Lungs Findings: Clear to auscultation         Mental Status:  Mental Status Findings:  Cooperative         Anesthesia Plan  Type of Anesthesia, risks & benefits discussed:  Anesthesia Type:  general    Patient's Preference:   Plan Factors:          Intra-op Monitoring Plan:   Intra-op Monitoring Plan Comments:   Post Op Pain Control Plan: multimodal analgesia  Post Op Pain Control Plan Comments:     Induction:   IV  Beta Blocker:         Informed Consent: Patient representative understands risks and agrees with Anesthesia plan.  Questions answered. Anesthesia consent signed with patient.  ASA Score: 2     Day of Surgery Review of History & Physical: I have interviewed and examined the patient. I have reviewed the patient's H&P dated:  There are no significant changes.  H&P update referred to the surgeon.         Ready For Surgery From Anesthesia Perspective.

## 2022-01-20 NOTE — ANESTHESIA POSTPROCEDURE EVALUATION
Anesthesia Post Evaluation    Patient: Ilene Garber    Procedure(s) Performed: Procedure(s) (LRB):  EGD (ESOPHAGOGASTRODUODENOSCOPY) (N/A)  COLONOSCOPY (N/A)    Final Anesthesia Type: general      Patient location during evaluation: PACU  Patient participation: Yes- Able to Participate  Level of consciousness: awake and alert and oriented  Post-procedure vital signs: reviewed and stable  Pain management: adequate  Airway patency: patent  JEZ mitigation strategies: Intraoperative administration of CPAP, nasopharyngeal airway, or oral appliance during sedation and Multimodal analgesia  PONV status at discharge: No PONV  Anesthetic complications: no      Cardiovascular status: hemodynamically stable  Respiratory status: unassisted  Hydration status: euvolemic  Follow-up not needed.          Vitals Value Taken Time   /91 01/19/22 1705   Temp 36.8 °C (98.2 °F) 01/19/22 1637   Pulse 63 01/19/22 1705   Resp 18 01/19/22 1705   SpO2 98 % 01/19/22 1705         Event Time   Out of Recovery 17:10:29         Pain/Onofre Score: Onofre Score: 10 (1/19/2022  5:05 PM)

## 2022-01-26 ENCOUNTER — TELEPHONE (OUTPATIENT)
Dept: GASTROENTEROLOGY | Facility: CLINIC | Age: 47
End: 2022-01-26
Payer: COMMERCIAL

## 2022-01-26 DIAGNOSIS — K29.70 HELICOBACTER PYLORI GASTRITIS: Primary | ICD-10-CM

## 2022-01-26 DIAGNOSIS — B96.81 HELICOBACTER PYLORI GASTRITIS: Primary | ICD-10-CM

## 2022-01-26 LAB
FINAL PATHOLOGIC DIAGNOSIS: NORMAL
GROSS: NORMAL
Lab: NORMAL

## 2022-01-26 RX ORDER — OMEPRAZOLE 20 MG/1
20 CAPSULE, DELAYED RELEASE ORAL EVERY 12 HOURS
Qty: 20 CAPSULE | Refills: 0 | Status: SHIPPED | OUTPATIENT
Start: 2022-01-26 | End: 2022-02-21

## 2022-01-26 RX ORDER — DOXYCYCLINE HYCLATE 100 MG
100 TABLET ORAL EVERY 12 HOURS
Qty: 20 TABLET | Refills: 0 | Status: SHIPPED | OUTPATIENT
Start: 2022-01-26 | End: 2022-02-05

## 2022-01-26 RX ORDER — METRONIDAZOLE 250 MG/1
250 TABLET ORAL EVERY 6 HOURS
Qty: 40 TABLET | Refills: 0 | Status: SHIPPED | OUTPATIENT
Start: 2022-01-26 | End: 2022-02-05

## 2022-01-26 NOTE — PROGRESS NOTES
Saad - please schedule patient follow up GI clinic video visit to discuss mild proctitis.    Saad- Please tell patient that their EGD pathology is positive for and H. Pylori infection and recommend Quadruple therapy treatment for H. Pylori with:    Omeprazole 20mg 12 hours for 10 days  Doxycycline 100mg every 12 hours for 10 days  Metronidazole 250mg every 6 hours for 10 days  Bismuth subsalicylate 524mg every 6 hours for 10days.     Don't drink alcohol on these medicines. Take after meals and drink plenty of water to make sure all pills clear esophagus.      Please mail patient UpToDate patient information on H. Pylori and have patient read this prior to beginning treatment.    Saad - please order stool for H. Pylori Antigen in 12 weeks.    Please tell patient to check for H. Pylori eradiation we would like to check stool sample in 12 weeks which tells us if H. Pylori has been successfully eradicated with the prior treatment medications.      H. Pylori antigen test which tells us if H. Pylori has been successfully eradicated with the prior treatment medications, and please tell patient that Stool H. Pylori antigen needs to be done off the following medications for the following amount of time:    1. Off all Antibiotics for 4 (Four) weeks before stool collection.      2. Off all Proton Pump Inhibitors medications for 2 (Two) weeks before collecting stool for H. Pylori Antigen:  :  Nexium (esomeprazole)  Prilosec (omeprazole)   Protonix (pantoprazole)  Prevacid (lansoprazole)  Aciphex (rabeprazole)  Dexilant (dexlansoprazole)    Zegerid     3. Off all H2 blockers medications for 2 (Two) weeks before collecting stool for H. Pylori Antigen:    Zantac (ranitidine)  Tagamet (cimetadine)  Axid (nizatidine)   Pepcid (famotidine)    4. Off Pepto-Bismol for 4 (four) weeks prior to collecting stool for H. Pylori Antigen.    Saad - please mail this to patient so they can follow it for their stool for H. pylori antigen  collection.        1. Stomach (biopsy):   Moderate chronic antral and oxyntic gastritis with mild activity   Helicobacter organisms present   2. Rectum (biopsy):   Mild active proctitis   No evidence of chronic injury    Comment: Interp By Katia Ho M.D., Signed on 01/26/2022

## 2022-01-26 NOTE — TELEPHONE ENCOUNTER
----- Message from Cirilo Tate MD sent at 1/26/2022 12:43 PM CST -----  Saad - please schedule patient follow up GI clinic video visit to discuss mild proctitis.    Saad- Please tell patient that their EGD pathology is positive for and H. Pylori infection and recommend Quadruple therapy treatment for H. Pylori with:    Omeprazole 20mg 12 hours for 10 days  Doxycycline 100mg every 12 hours for 10 days  Metronidazole 250mg every 6 hours for 10 days  Bismuth subsalicylate 524mg every 6 hours for 10days.     Don't drink alcohol on these medicines. Take after meals and drink plenty of water to make sure all pills clear esophagus.      Please mail patient UpToDate patient information on H. Pylori and have patient read this prior to beginning treatment.    Saad - please order stool for H. Pylori Antigen in 12 weeks.    Please tell patient to check for H. Pylori eradiation we would like to check stool sample in 12 weeks which tells us if H. Pylori has been successfully eradicated with the prior treatment medications.      H. Pylori antigen test which tells us if H. Pylori has been successfully eradicated with the prior treatment medications, and please tell patient that Stool H. Pylori antigen needs to be done off the following medications for the following amount of time:    1. Off all Antibiotics for 4 (Four) weeks before stool collection.      2. Off all Proton Pump Inhibitors medications for 2 (Two) weeks before collecting stool for H. Pylori Antigen:  :  Nexium (esomeprazole)  Prilosec (omeprazole)   Protonix (pantoprazole)  Prevacid (lansoprazole)  Aciphex (rabeprazole)  Dexilant (dexlansoprazole)    Zegerid     3. Off all H2 blockers medications for 2 (Two) weeks before collecting stool for H. Pylori Antigen:    Zantac (ranitidine)  Tagamet (cimetadine)  Axid (nizatidine)   Pepcid (famotidine)    4. Off Pepto-Bismol for 4 (four) weeks prior to collecting stool for H. Pylori Antigen.    Saad - please  mail this to patient so they can follow it for their stool for H. pylori antigen collection.        1. Stomach (biopsy):   Moderate chronic antral and oxyntic gastritis with mild activity   Helicobacter organisms present   2. Rectum (biopsy):   Mild active proctitis   No evidence of chronic injury    Comment: Interp By Katia Ho M.D., Signed on 01/26/2022

## 2022-01-26 NOTE — TELEPHONE ENCOUNTER
Contact patient per Saad Hastings - please schedule patient follow up GI clinic video visit to discuss mild proctitis.     Saad- Please tell patient that their EGD pathology is positive for and H. Pylori infection and recommend Quadruple therapy treatment for H. Pylori with:     Omeprazole 20mg 12 hours for 10 days   Doxycycline 100mg every 12 hours for 10 days   Metronidazole 250mg every 6 hours for 10 days   Bismuth subsalicylate 524mg every 6 hours for 10days.     Don't drink alcohol on these medicines. Take after meals and drink plenty of water to make sure all pills clear esophagus.       Please mail patient UpToDate patient information on H. Pylori and have patient read this prior to beginning treatment.     Saad - please order stool for H. Pylori Antigen in 12 weeks.     Please tell patient to check for H. Pylori eradiation we would like to check stool sample in 12 weeks which tells us if H. Pylori has been successfully eradicated with the prior treatment medications.       H. Pylori antigen test which tells us if H. Pylori has been successfully eradicated with the prior treatment medications, and please tell patient that Stool H. Pylori antigen needs to be done off the following medications for the following amount of time:     1. Off all Antibiotics for 4 (Four) weeks before stool collection.       2. Off all Proton Pump Inhibitors medications for 2 (Two) weeks before collecting stool for H. Pylori Antigen:   :   Nexium (esomeprazole)   Prilosec (omeprazole)   Protonix (pantoprazole)   Prevacid (lansoprazole)   Aciphex (rabeprazole)   Dexilant (dexlansoprazole)     Zegerid     3. Off all H2 blockers medications for 2 (Two) weeks before collecting stool for H. Pylori Antigen:     Zantac (ranitidine)   Tagamet (cimetadine)   Axid (nizatidine)   Pepcid (famotidine)     4. Off Pepto-Bismol for 4 (four) weeks prior to collecting stool for H. Pylori Antigen.     Saad - please mail this to patient so  they can follow it for their stool for H. pylori antigen collection.         1. Stomach (biopsy):   Moderate chronic antral and oxyntic gastritis with mild activity   Helicobacter organisms present   2. Rectum (biopsy):   Mild active proctitis   No evidence of chronic injury   Comment: Interp By Katia Ho M.D., Signed on 01/26/2022     Patient verbalized understanding.

## 2022-02-04 ENCOUNTER — TELEPHONE (OUTPATIENT)
Dept: SURGERY | Facility: CLINIC | Age: 47
End: 2022-02-04
Payer: COMMERCIAL

## 2022-02-04 ENCOUNTER — ANESTHESIA EVENT (OUTPATIENT)
Dept: SURGERY | Facility: HOSPITAL | Age: 47
End: 2022-02-04
Payer: COMMERCIAL

## 2022-02-04 NOTE — PRE-PROCEDURE INSTRUCTIONS
PREOP INSTRUCTIONS:Nothing to eat or drink for 8 hours before surgery.Instructed to follow the surgeon's instructions if they differ from these.Shower instructions as well as directions to the Surgery Center were given.Encouraged to wear loose fitting,comfortable clothing.Medication instructions for pm prior to and am of procedure reviewed.Instructed to avoid taking vitamins,supplements,aspirin and ibuprofen the morning of surgery.      Patient denies any side effects or issues with anesthesia or sedation other than PONV     Patient does not know arrival time.Explained that this information comes from the surgeon's office and if they haven't heard from them by 2 or 3 pm to call the office.Patient stated an understanding.

## 2022-02-04 NOTE — ANESTHESIA PREPROCEDURE EVALUATION
[facility]  Anesthesia Pre-Operative Evaluation         Patient Name: Ilene Garber  YOB: 1975  MRN: [id number]    SUBJECTIVE:     2022    Procedure(s) (LRB):  CHOLECYSTECTOMY, LAPAROSCOPIC (N/A)    Ilene Garber is a 46 y.o. female here for above procedure        Patient Active Problem List   Diagnosis    Hypertension    GERD (gastroesophageal reflux disease)    Mild intermittent asthma without complication    Class 1 obesity due to excess calories with serious comorbidity and body mass index (BMI) of 30.0 to 30.9 in adult    Musculoskeletal chest pain       Review of patient's allergies indicates:  No Known Allergies    No current facility-administered medications on file prior to encounter.     Current Outpatient Medications on File Prior to Encounter   Medication Sig Dispense Refill    cholecalciferol, vitamin D3, (VITAMIN D3) 50 mcg (2,000 unit) Cap Take 2 capsules (4,000 Units total) by mouth once daily. 180 capsule 1    ferrous gluconate (FERGON) 324 MG tablet Take 1 tablet (324 mg total) by mouth daily with breakfast. 90 tablet 1    fluticasone propionate (FLONASE) 50 mcg/actuation nasal spray 2 sprays (100 mcg total) by Each Nostril route once daily. (Patient taking differently: 2 sprays by Each Nostril route every evening.) 18.2 mL 3    spironolactone (ALDACTONE) 25 MG tablet Take 1 tablet (25 mg total) by mouth once daily. 90 tablet 3    albuterol (PROVENTIL) 5 mg/mL nebulizer solution Inhale 2 puffs into the lungs.      tiZANidine (ZANAFLEX) 4 MG tablet 1 po QHS prn back pain 30 tablet 0       Past Surgical History:   Procedure Laterality Date    BILATERAL TUBAL LIGATION       SECTION      x 3    COLONOSCOPY N/A 2022    Procedure: COLONOSCOPY;  Surgeon: Cirilo Ttae MD;  Location: [facility] ([address] [place] [address]);  Service: Endoscopy;  Laterality: N/A;  Constipation bowel prep    ESOPHAGOGASTRODUODENOSCOPY N/A 2022    Procedure: EGD  (ESOPHAGOGASTRODUODENOSCOPY);  Surgeon: Cirilo Tate MD;  Location: [facility] ([address] [place] [address]);  Service: Endoscopy;  Laterality: N/A;  COVID test at [place] on 1/16-GT    LAPAROSCOPY LYSIS OF ADHESIONS  11/28/2018       Social History     Socioeconomic History    Marital status:    Tobacco Use    Smoking status: Never Smoker    Smokeless tobacco: Never Used   Substance and Sexual Activity    Alcohol use: Yes     Comment: Just Friday two [place] Ice teas    Drug use: Never    Sexual activity: Yes     Partners: Male     Birth control/protection: See Surgical Hx     Comment: Bilateral to ligation   Social History Narrative    She is     She works as a Nurse         OBJECTIVE:     Vital Signs Range (Last 24H):       Significant Labs:  Lab Results   Component Value Date    WBC 4.20 12/29/2021    HGB 12.5 01/03/2022    HCT 43.1 12/29/2021     12/29/2021    CHOL 151 01/03/2022    TRIG 93 01/03/2022    HDL 35 (L) 01/03/2022    ALT 9 (L) 01/03/2022    AST 14 01/03/2022     01/03/2022    K 4.1 01/03/2022     01/03/2022    CREATININE 1.0 01/03/2022    BUN 8 01/03/2022    CO2 20 (L) 01/03/2022    TSH 1.510 01/03/2022           EKG:   Results for orders placed or performed during the hospital encounter of 12/29/21   SCHEDULED EKG 12-LEAD (to Muse)    Collection Time: 12/29/21 10:48 AM    Narrative    Test Reason : K80.20,    Vent. Rate : 071 BPM     Atrial Rate : 071 BPM     P-R Int : 162 ms          QRS Dur : 076 ms      QT Int : 380 ms       P-R-T Axes : 056 037 038 degrees     QTc Int : 412 ms    Normal sinus rhythm  Normal ECG  No previous ECGs available  Confirmed by Mitesh PIKE, Mt MEDINA ([id number]) on 12/29/2021 2:43:35 PM    Referred By: HENRIQUE MENDOZA           Confirmed By:Mt Sagastume MD       Prior airway:   11/28/18; 1128; Direct laryngoscopy, Stylet; Oral Standard; No; 6.5 mm; Cuffed; Auscultation, Capnometry, Symmetrical chest wall movement; Paper tape; Momo; 3;      Anesthesia Evaluation    I have reviewed the Patient Summary Reports.    I have reviewed the Nursing Notes. I have reviewed the NPO Status.      Review of Systems  Anesthesia Hx:  Hx of Anesthetic complications Nausea with some anesthetics, but not all past surgeries  History of prior surgery of interest to airway management or planning: Personal Hx of Anesthesia complications, Post-Operative Nausea/Vomiting   Social:  Non-Smoker, Alcohol Use    Hematology/Oncology:     Oncology Normal    -- Anemia (Iron supplementation):   EENT/Dental:   chronic allergic rhinitis   Cardiovascular:   Exercise tolerance: good Hypertension Denies MI.      Pulmonary:   Asthma (asccoiated with allergies)    Renal/:  Renal/ Normal     Hepatic/GI:   GERD Gallstones with associated pain, nausea, and vomiting. Has pain morning of surgery.  Nausea controlled.  Last episode of vomiting on 2/4.    Musculoskeletal:  Musculoskeletal Normal    Neurological:  Neurology Normal Denies TIA.  Denies CVA. Denies Seizures.    Endocrine:   Obesity, BMI 32.6  Vit D deficiency    Dermatological:  Skin Normal        Physical Exam  General:  Well nourished    Airway/Jaw/Neck:  Airway Findings: Mouth Opening: Normal General Airway Assessment: Adult  Mallampati: II  TM Distance: Normal, at least 6 cm  Jaw/Neck Findings:  Neck ROM: Normal ROM      Dental:  Dental Findings: In tact   Chest/Lungs:  Chest/Lungs Findings: Clear to auscultation, Normal Respiratory Rate     Heart/Vascular:  Heart Findings: Rate: Normal  Rhythm: Regular Rhythm  Sounds: Normal        Mental Status:  Mental Status Findings:  Cooperative, Alert and Oriented, Anxious         Anesthesia Plan  Type of Anesthesia, risks & benefits discussed:  Anesthesia Type:  general    Patient's Preference:   Plan Factors:          Intra-op Monitoring Plan: standard ASA monitors  Intra-op Monitoring Plan Comments:   Post Op Pain Control Plan: IV/PO Opioids PRN, per primary service following  discharge from PACU and multimodal analgesia  Post Op Pain Control Plan Comments:     Induction:   IV  Beta Blocker:  Patient is not currently on a Beta-Blocker (No further documentation required).       Informed Consent: Patient understands risks and agrees with Anesthesia plan.  Questions answered. Anesthesia consent signed with patient.  ASA Score: 2     Day of Surgery Review of History & Physical:    H&P update referred to the surgeon.     Anesthesia Plan Notes:   NPO: Last ate 14:00 on 2/6/2022.     Meds: None on day of surgery.     Plan for anti-emetic prophylaxis with scopolamine and IV agents intra-op.           Ready For Surgery From Anesthesia Perspective.

## 2022-02-07 ENCOUNTER — ANESTHESIA (OUTPATIENT)
Dept: SURGERY | Facility: HOSPITAL | Age: 47
End: 2022-02-07
Payer: COMMERCIAL

## 2022-02-07 ENCOUNTER — HOSPITAL ENCOUNTER (OUTPATIENT)
Facility: HOSPITAL | Age: 47
Discharge: HOME | End: 2022-02-07
Attending: SURGERY | Admitting: SURGERY
Payer: COMMERCIAL

## 2022-02-07 VITALS
TEMPERATURE: 98 F | RESPIRATION RATE: 18 BRPM | SYSTOLIC BLOOD PRESSURE: 150 MMHG | HEIGHT: 67 IN | BODY MASS INDEX: 32.18 KG/M2 | OXYGEN SATURATION: 100 % | DIASTOLIC BLOOD PRESSURE: 93 MMHG | HEART RATE: 69 BPM | WEIGHT: 205 LBS

## 2022-02-07 DIAGNOSIS — K80.20 CALCULUS OF GALLBLADDER WITHOUT CHOLECYSTITIS WITHOUT OBSTRUCTION: ICD-10-CM

## 2022-02-07 DIAGNOSIS — K21.9 GASTROESOPHAGEAL REFLUX DISEASE, UNSPECIFIED WHETHER ESOPHAGITIS PRESENT: ICD-10-CM

## 2022-02-07 LAB
B-HCG UR QL: NEGATIVE
CTP QC/QA: YES
CTP QC/QA: YES
SARS-COV-2 AG RESP QL IA.RAPID: NEGATIVE

## 2022-02-07 PROCEDURE — 25000003 PHARM REV CODE 250: Performed by: SURGERY

## 2022-02-07 PROCEDURE — D9220A PRA ANESTHESIA: Mod: ANES,,, | Performed by: STUDENT IN AN ORGANIZED HEALTH CARE EDUCATION/TRAINING PROGRAM

## 2022-02-07 PROCEDURE — 47562 PR LAP,CHOLECYSTECTOMY: ICD-10-PCS | Mod: ,,, | Performed by: SURGERY

## 2022-02-07 PROCEDURE — 63600175 PHARM REV CODE 636 W HCPCS: Performed by: STUDENT IN AN ORGANIZED HEALTH CARE EDUCATION/TRAINING PROGRAM

## 2022-02-07 PROCEDURE — 25000003 PHARM REV CODE 250: Performed by: STUDENT IN AN ORGANIZED HEALTH CARE EDUCATION/TRAINING PROGRAM

## 2022-02-07 PROCEDURE — 27201423 OPTIME MED/SURG SUP & DEVICES STERILE SUPPLY: Performed by: SURGERY

## 2022-02-07 PROCEDURE — 71000015 HC POSTOP RECOV 1ST HR: Performed by: SURGERY

## 2022-02-07 PROCEDURE — 81025 URINE PREGNANCY TEST: CPT | Performed by: SURGERY

## 2022-02-07 PROCEDURE — 47562 LAPAROSCOPIC CHOLECYSTECTOMY: CPT | Mod: ,,, | Performed by: SURGERY

## 2022-02-07 PROCEDURE — 71000044 HC DOSC ROUTINE RECOVERY FIRST HOUR: Performed by: SURGERY

## 2022-02-07 PROCEDURE — 88304 PR  SURG PATH,LEVEL III: ICD-10-PCS | Mod: 26,,, | Performed by: STUDENT IN AN ORGANIZED HEALTH CARE EDUCATION/TRAINING PROGRAM

## 2022-02-07 PROCEDURE — 36000708 HC OR TIME LEV III 1ST 15 MIN: Performed by: SURGERY

## 2022-02-07 PROCEDURE — D9220A PRA ANESTHESIA: Mod: CRNA,,, | Performed by: STUDENT IN AN ORGANIZED HEALTH CARE EDUCATION/TRAINING PROGRAM

## 2022-02-07 PROCEDURE — 88304 TISSUE EXAM BY PATHOLOGIST: CPT | Performed by: STUDENT IN AN ORGANIZED HEALTH CARE EDUCATION/TRAINING PROGRAM

## 2022-02-07 PROCEDURE — 71000016 HC POSTOP RECOV ADDL HR: Performed by: SURGERY

## 2022-02-07 PROCEDURE — 88304 TISSUE EXAM BY PATHOLOGIST: CPT | Mod: 26,,, | Performed by: STUDENT IN AN ORGANIZED HEALTH CARE EDUCATION/TRAINING PROGRAM

## 2022-02-07 PROCEDURE — D9220A PRA ANESTHESIA: ICD-10-PCS | Mod: ANES,,, | Performed by: STUDENT IN AN ORGANIZED HEALTH CARE EDUCATION/TRAINING PROGRAM

## 2022-02-07 PROCEDURE — D9220A PRA ANESTHESIA: ICD-10-PCS | Mod: CRNA,,, | Performed by: STUDENT IN AN ORGANIZED HEALTH CARE EDUCATION/TRAINING PROGRAM

## 2022-02-07 PROCEDURE — 36000709 HC OR TIME LEV III EA ADD 15 MIN: Performed by: SURGERY

## 2022-02-07 PROCEDURE — 37000009 HC ANESTHESIA EA ADD 15 MINS: Performed by: SURGERY

## 2022-02-07 PROCEDURE — 63600175 PHARM REV CODE 636 W HCPCS: Performed by: SURGERY

## 2022-02-07 PROCEDURE — 37000008 HC ANESTHESIA 1ST 15 MINUTES: Performed by: SURGERY

## 2022-02-07 RX ORDER — FENTANYL CITRATE 50 UG/ML
25 INJECTION, SOLUTION INTRAMUSCULAR; INTRAVENOUS EVERY 5 MIN PRN
Status: DISCONTINUED | OUTPATIENT
Start: 2022-02-07 | End: 2022-02-07 | Stop reason: HOSPADM

## 2022-02-07 RX ORDER — ONDANSETRON 2 MG/ML
INJECTION INTRAMUSCULAR; INTRAVENOUS
Status: DISCONTINUED | OUTPATIENT
Start: 2022-02-07 | End: 2022-02-07

## 2022-02-07 RX ORDER — PROCHLORPERAZINE EDISYLATE 5 MG/ML
5 INJECTION INTRAMUSCULAR; INTRAVENOUS EVERY 30 MIN PRN
Status: DISCONTINUED | OUTPATIENT
Start: 2022-02-07 | End: 2022-02-07 | Stop reason: HOSPADM

## 2022-02-07 RX ORDER — HYDROMORPHONE HYDROCHLORIDE 2 MG/ML
INJECTION, SOLUTION INTRAMUSCULAR; INTRAVENOUS; SUBCUTANEOUS
Status: DISCONTINUED | OUTPATIENT
Start: 2022-02-07 | End: 2022-02-07

## 2022-02-07 RX ORDER — FENTANYL CITRATE 50 UG/ML
INJECTION, SOLUTION INTRAMUSCULAR; INTRAVENOUS
Status: DISCONTINUED | OUTPATIENT
Start: 2022-02-07 | End: 2022-02-07

## 2022-02-07 RX ORDER — LIDOCAINE HCL/PF 100 MG/5ML
SYRINGE (ML) INTRAVENOUS
Status: DISCONTINUED | OUTPATIENT
Start: 2022-02-07 | End: 2022-02-07

## 2022-02-07 RX ORDER — PHENYLEPHRINE HYDROCHLORIDE 10 MG/ML
INJECTION INTRAVENOUS
Status: DISCONTINUED | OUTPATIENT
Start: 2022-02-07 | End: 2022-02-07

## 2022-02-07 RX ORDER — PROPOFOL 10 MG/ML
VIAL (ML) INTRAVENOUS
Status: DISCONTINUED | OUTPATIENT
Start: 2022-02-07 | End: 2022-02-07

## 2022-02-07 RX ORDER — SCOLOPAMINE TRANSDERMAL SYSTEM 1 MG/1
1 PATCH, EXTENDED RELEASE TRANSDERMAL
Status: DISCONTINUED | OUTPATIENT
Start: 2022-02-07 | End: 2022-02-07 | Stop reason: HOSPADM

## 2022-02-07 RX ORDER — HYDROMORPHONE HYDROCHLORIDE 1 MG/ML
0.3 INJECTION, SOLUTION INTRAMUSCULAR; INTRAVENOUS; SUBCUTANEOUS EVERY 10 MIN PRN
Status: DISCONTINUED | OUTPATIENT
Start: 2022-02-07 | End: 2022-02-07 | Stop reason: HOSPADM

## 2022-02-07 RX ORDER — MIDAZOLAM HYDROCHLORIDE 1 MG/ML
INJECTION INTRAMUSCULAR; INTRAVENOUS
Status: DISCONTINUED | OUTPATIENT
Start: 2022-02-07 | End: 2022-02-07

## 2022-02-07 RX ORDER — DEXMEDETOMIDINE HYDROCHLORIDE 100 UG/ML
INJECTION, SOLUTION INTRAVENOUS
Status: DISCONTINUED | OUTPATIENT
Start: 2022-02-07 | End: 2022-02-07

## 2022-02-07 RX ORDER — OXYCODONE AND ACETAMINOPHEN 5; 325 MG/1; MG/1
1 TABLET ORAL EVERY 6 HOURS PRN
Qty: 15 TABLET | Refills: 0 | Status: SHIPPED | OUTPATIENT
Start: 2022-02-07 | End: 2022-02-21

## 2022-02-07 RX ORDER — BUPIVACAINE HYDROCHLORIDE 2.5 MG/ML
INJECTION, SOLUTION EPIDURAL; INFILTRATION; INTRACAUDAL
Status: DISCONTINUED | OUTPATIENT
Start: 2022-02-07 | End: 2022-02-07 | Stop reason: HOSPADM

## 2022-02-07 RX ORDER — DEXAMETHASONE SODIUM PHOSPHATE 4 MG/ML
INJECTION, SOLUTION INTRA-ARTICULAR; INTRALESIONAL; INTRAMUSCULAR; INTRAVENOUS; SOFT TISSUE
Status: DISCONTINUED | OUTPATIENT
Start: 2022-02-07 | End: 2022-02-07

## 2022-02-07 RX ORDER — ROCURONIUM BROMIDE 10 MG/ML
INJECTION, SOLUTION INTRAVENOUS
Status: DISCONTINUED | OUTPATIENT
Start: 2022-02-07 | End: 2022-02-07

## 2022-02-07 RX ORDER — SODIUM CHLORIDE 9 MG/ML
INJECTION, SOLUTION INTRAVENOUS CONTINUOUS PRN
Status: DISCONTINUED | OUTPATIENT
Start: 2022-02-07 | End: 2022-02-07

## 2022-02-07 RX ORDER — FAMOTIDINE 10 MG/ML
INJECTION INTRAVENOUS
Status: DISCONTINUED | OUTPATIENT
Start: 2022-02-07 | End: 2022-02-07

## 2022-02-07 RX ORDER — CEFAZOLIN SODIUM/WATER 2 G/20 ML
2 SYRINGE (ML) INTRAVENOUS
Status: COMPLETED | OUTPATIENT
Start: 2022-02-07 | End: 2022-02-07

## 2022-02-07 RX ORDER — NEOSTIGMINE METHYLSULFATE 0.5 MG/ML
INJECTION, SOLUTION INTRAVENOUS
Status: DISCONTINUED | OUTPATIENT
Start: 2022-02-07 | End: 2022-02-07

## 2022-02-07 RX ORDER — LIDOCAINE HYDROCHLORIDE AND EPINEPHRINE 10; 10 MG/ML; UG/ML
INJECTION, SOLUTION INFILTRATION; PERINEURAL
Status: DISCONTINUED | OUTPATIENT
Start: 2022-02-07 | End: 2022-02-07 | Stop reason: HOSPADM

## 2022-02-07 RX ORDER — ACETAMINOPHEN 10 MG/ML
INJECTION, SOLUTION INTRAVENOUS
Status: DISCONTINUED | OUTPATIENT
Start: 2022-02-07 | End: 2022-02-07

## 2022-02-07 RX ADMIN — PROPOFOL 40 MG: 10 INJECTION, EMULSION INTRAVENOUS at 10:02

## 2022-02-07 RX ADMIN — PHENYLEPHRINE HYDROCHLORIDE 100 MCG: 10 INJECTION INTRAVENOUS at 10:02

## 2022-02-07 RX ADMIN — HYDROMORPHONE HYDROCHLORIDE 0.2 MG: 2 INJECTION INTRAMUSCULAR; INTRAVENOUS; SUBCUTANEOUS at 11:02

## 2022-02-07 RX ADMIN — Medication 2 G: at 10:02

## 2022-02-07 RX ADMIN — SODIUM CHLORIDE: 0.9 INJECTION, SOLUTION INTRAVENOUS at 09:02

## 2022-02-07 RX ADMIN — NEOSTIGMINE METHYLSULFATE 5 MG: 0.5 INJECTION INTRAVENOUS at 11:02

## 2022-02-07 RX ADMIN — MIDAZOLAM HYDROCHLORIDE 2 MG: 1 INJECTION, SOLUTION INTRAMUSCULAR; INTRAVENOUS at 09:02

## 2022-02-07 RX ADMIN — SODIUM CHLORIDE, SODIUM GLUCONATE, SODIUM ACETATE, POTASSIUM CHLORIDE, MAGNESIUM CHLORIDE, SODIUM PHOSPHATE, DIBASIC, AND POTASSIUM PHOSPHATE: .53; .5; .37; .037; .03; .012; .00082 INJECTION, SOLUTION INTRAVENOUS at 10:02

## 2022-02-07 RX ADMIN — DEXMEDETOMIDINE HYDROCHLORIDE 4 MCG: 100 INJECTION, SOLUTION INTRAVENOUS at 10:02

## 2022-02-07 RX ADMIN — FENTANYL CITRATE 100 MCG: 50 INJECTION, SOLUTION INTRAMUSCULAR; INTRAVENOUS at 10:02

## 2022-02-07 RX ADMIN — DEXAMETHASONE SODIUM PHOSPHATE 8 MG: 4 INJECTION, SOLUTION INTRAMUSCULAR; INTRAVENOUS at 10:02

## 2022-02-07 RX ADMIN — FENTANYL CITRATE 25 MCG: 50 INJECTION INTRAMUSCULAR; INTRAVENOUS at 11:02

## 2022-02-07 RX ADMIN — ROCURONIUM BROMIDE 50 MG: 10 INJECTION, SOLUTION INTRAVENOUS at 10:02

## 2022-02-07 RX ADMIN — ACETAMINOPHEN 1000 MG: 10 INJECTION, SOLUTION INTRAVENOUS at 10:02

## 2022-02-07 RX ADMIN — FAMOTIDINE 20 MG: 10 INJECTION INTRAVENOUS at 10:02

## 2022-02-07 RX ADMIN — FENTANYL CITRATE 25 MCG: 50 INJECTION INTRAMUSCULAR; INTRAVENOUS at 01:02

## 2022-02-07 RX ADMIN — ONDANSETRON HYDROCHLORIDE 4 MG: 2 INJECTION INTRAMUSCULAR; INTRAVENOUS at 11:02

## 2022-02-07 RX ADMIN — GLYCOPYRROLATE 0.2 MG: 0.2 INJECTION INTRAMUSCULAR; INTRAVENOUS at 10:02

## 2022-02-07 RX ADMIN — LIDOCAINE HYDROCHLORIDE 80 MG: 20 INJECTION, SOLUTION INTRAVENOUS at 10:02

## 2022-02-07 RX ADMIN — SCOPALAMINE 1 PATCH: 1 PATCH, EXTENDED RELEASE TRANSDERMAL at 08:02

## 2022-02-07 RX ADMIN — GLYCOPYRROLATE 0.6 MG: 0.2 INJECTION INTRAMUSCULAR; INTRAVENOUS at 11:02

## 2022-02-07 RX ADMIN — PROPOFOL 130 MG: 10 INJECTION, EMULSION INTRAVENOUS at 10:02

## 2022-02-07 NOTE — TRANSFER OF CARE
"Anesthesia Transfer of Care Note    Patient: Ilene Garber    Procedure(s) Performed: Procedure(s) (LRB):  CHOLECYSTECTOMY, LAPAROSCOPIC (N/A)    Patient location: [facility]    Anesthesia Type: general    Transport from OR: Transported from OR on 2-3 L/min O2 by NC with adequate spontaneous ventilation    Post pain: adequate analgesia    Post assessment: no apparent anesthetic complications    Post vital signs: stable    Level of consciousness: awake    Nausea/Vomiting: no nausea/vomiting    Complications: none    Transfer of care protocol was followed      Last vitals:   Visit Vitals  BP (!) 142/83 (BP Location: Right arm, Patient Position: Lying)   Pulse (!) 18   Temp 36.7 °C (98.1 °F) (Oral)   Resp (!) 74   Ht 5' 6.5" (1.689 m)   Wt 93 kg (205 lb)   LMP 01/12/2022   SpO2 99%   Breastfeeding No   BMI 32.59 kg/m²     "

## 2022-02-07 NOTE — BRIEF OP NOTE
[facility] - Surgery ([facility])  Brief Operative Note    Surgery Date: 2/7/2022     Surgeon(s) and Role:     * Chau Thomason Jr., MD - Primary     * Felecia Castro MD - Resident - Assisting    Pre-op Diagnosis:  Calculus of gallbladder without cholecystitis without obstruction [K80.20]  Postprandial abdominal pain in right upper quadrant [R10.11]    Post-op Diagnosis:  Post-Op Diagnosis Codes:     * Calculus of gallbladder without cholecystitis without obstruction [K80.20]     * Postprandial abdominal pain in right upper quadrant [R10.11]    Procedure(s) (LRB):  CHOLECYSTECTOMY, LAPAROSCOPIC (N/A)    Anesthesia: General    Operative Findings: Laparoscopic cholecystectomy - gallbladder with sludge and stones    Estimated Blood Loss: * No values recorded between 2/7/2022 10:33 AM and 2/7/2022 11:33 AM *         Specimens:   Specimen (24h ago, onward)             Start     Ordered    02/07/22 1117  Specimen to Pathology, Surgery General Surgery  Once        Comments: Pre-op Diagnosis: Calculus of gallbladder without cholecystitis without obstruction [K80.20]  Postprandial abdominal pain in right upper quadrant [R10.11]      Procedure(s):  CHOLECYSTECTOMY, LAPAROSCOPIC     Number of specimens: 1    Name of specimens:   1.) Gallbladder (permanent)   References:    Click here for ordering Quick Tip   Question:  Procedure Type:  Answer:  General Surgery    02/07/22 1116                  Discharge Note    OUTCOME: Patient tolerated treatment/procedure well without complication and is now ready for discharge.    DISPOSITION: Home or Self Care    FINAL DIAGNOSIS:  Calculus of gallbladder without cholecystitis without obstruction    FOLLOWUP: In clinic    DISCHARGE INSTRUCTIONS:    Discharge Procedure Orders   Lifting restrictions   Order Comments: There are skin tapes on your skin (steri strips) leave those in place until they fall off on their own.   May shower in 48 hours. No submerging the incision in the water. May  wash softly with water and soap.  Call with any redness, hotness, or drainage from the incision. Pain that is not well controlled with the prescribed medications or any fevers greater than 101.  No heavy lifting over 10 lbs for 6 weeks.    Please keep your follow up appointment  You may resume all mediations that you were taking at home.     Notify your health care provider if you experience any of the following:  increased confusion or weakness     Notify your health care provider if you experience any of the following:  persistent dizziness, light-headedness, or visual disturbances     Notify your health care provider if you experience any of the following:  worsening rash     Notify your health care provider if you experience any of the following:  severe persistent headache     Notify your health care provider if you experience any of the following:  difficulty breathing or increased cough     Notify your health care provider if you experience any of the following:  redness, tenderness, or signs of infection (pain, swelling, redness, odor or green/yellow discharge around incision site)     Notify your health care provider if you experience any of the following:  severe uncontrolled pain     Notify your health care provider if you experience any of the following:  persistent nausea and vomiting or diarrhea     Notify your health care provider if you experience any of the following:  temperature >100.4     No dressing needed

## 2022-02-07 NOTE — H&P
Consult  Surgery        SUBJECTIVE:      Reason for Consult: Gallstones     History of Present Illness: Ilene Garber is a 46 y.o. female with HTN, Asthma, GERD. Started on protonix with some relief in heartburn symptoms but has had intermittent RUQ pain and nausea after eating for the last year. Has been worsening recently, particularly over the last 2mo. Tends to last for 1hr or so and resolve with PRN Zofran and time. Symptoms are generally associated with eating, worse with fatty foods but also occur in the night. Scheduled to undergo EGD with Dr. Tate in 2022     SxHx: C/S x 3, Lap PEDRO for pelvic adhesions in 2018          Current Outpatient Medications on File Prior to Visit   Medication Sig    albuterol (PROVENTIL) 5 mg/mL nebulizer solution Inhale 2 puffs into the lungs.    cholecalciferol, vitamin D3, (VITAMIN D3) 50 mcg (2,000 unit) Cap Take 2 capsules (4,000 Units total) by mouth once daily.    ergocalciferol (ERGOCALCIFEROL) 50,000 unit Cap Take 1 capsule (50,000 Units total) by mouth every 7 days. for 12 doses    ferrous gluconate (FERGON) 324 MG tablet Take 1 tablet (324 mg total) by mouth daily with breakfast.    fluticasone propionate (FLONASE) 50 mcg/actuation nasal spray 2 sprays (100 mcg total) by Each Nostril route once daily.    pantoprazole (PROTONIX) 40 MG tablet Take 1 tablet (40 mg total) by mouth once daily.    spironolactone (ALDACTONE) 25 MG tablet Take 1 tablet (25 mg total) by mouth once daily.    tiZANidine (ZANAFLEX) 4 MG tablet 1 po QHS prn back pain      No current facility-administered medications on file prior to visit.         Review of patient's allergies indicates:  No Known Allergies          Past Medical History:   Diagnosis Date    Asthma      GERD (gastroesophageal reflux disease)      Hypertension      Ovarian cyst      Rotator cuff tear, right              Past Surgical History:   Procedure Laterality Date    BILATERAL TUBAL LIGATION          "SECTION         x 3    LAPAROSCOPY LYSIS OF ADHESIONS   11/28/2018            Family History   Problem Relation Age of Onset    COPD Mother      Coronary artery disease Mother           s/p CABG    Hypertension Maternal Grandmother      Diabetes Maternal Grandmother      Breast cancer Neg Hx      Colon cancer Neg Hx      Ovarian cancer Neg Hx      Celiac disease Neg Hx      Cirrhosis Neg Hx      Crohn's disease Neg Hx      Esophageal cancer Neg Hx      Inflammatory bowel disease Neg Hx      Liver cancer Neg Hx      Liver disease Neg Hx      Rectal cancer Neg Hx      Stomach cancer Neg Hx      Ulcerative colitis Neg Hx      Pancreatic cancer Neg Hx      Kidney cancer Neg Hx      Bladder Cancer Neg Hx      Uterine cancer Neg Hx        Social History            Tobacco Use    Smoking status: Never Smoker    Smokeless tobacco: Never Used   Substance Use Topics    Alcohol use: Yes       Comment: Just Friday two [place] Ice teas    Drug use: Never         Review of Systems   Constitutional: Negative for activity change, appetite change, fatigue, fever and unexpected weight change.   Respiratory: Negative for apnea, cough, choking and shortness of breath.    Cardiovascular: Negative for chest pain and palpitations.   Gastrointestinal: Positive for abdominal pain and nausea. Negative for abdominal distention, blood in stool, constipation, diarrhea and vomiting.   Skin: Negative for color change, pallor, rash and wound.      Otherwise negative except as listed above     OBJECTIVE:      Vitals:    02/07/22 0842   BP: (!) 142/83   BP Location: Right arm   Patient Position: Lying   Pulse: (!) 18   Resp: (!) 74   Temp: 98.1 °F (36.7 °C)   TempSrc: Oral   SpO2: 99%   Weight: 93 kg (205 lb)   Height: 5' 6.5" (1.689 m)        Physical Exam  Constitutional:       Appearance: She is obese.   HENT:      Head: Normocephalic and atraumatic.   Cardiovascular:      Rate and Rhythm: Normal rate and regular " rhythm.      Pulses: Normal pulses.   Pulmonary:      Effort: Pulmonary effort is normal. No respiratory distress.      Breath sounds: No stridor. No wheezing.   Abdominal:      General: Abdomen is flat. There is no distension.      Palpations: There is no mass.      Tenderness: There is no abdominal tenderness. There is no guarding or rebound.      Hernia: No hernia is present.   Musculoskeletal:         General: No deformity. Normal range of motion.   Skin:     General: Skin is warm and dry.      Capillary Refill: Capillary refill takes less than 2 seconds.      Coloration: Skin is not jaundiced.   Neurological:      General: No focal deficit present.      Mental Status: She is alert and oriented to person, place, and time. Mental status is at baseline.   Psychiatric:         Mood and Affect: Mood normal.         Behavior: Behavior normal.         Thought Content: Thought content normal.         Judgment: Judgment normal.               Laboratory  none     Diagnostic Results:  U/S  FINDINGS:  Liver: Normal in size, measuring 13.3 cm. Homogeneous echotexture. No focal hepatic lesions.     Gallbladder: Biliary sludge and multiple stones measuring up to 1.3 cm.  No wall thickening or pericholecystic fluid.  No sonographic Caballero's sign.  No hyperemia.     Biliary system: The common duct is not dilated, measuring 3 mm.  No intrahepatic ductal dilatation.     Spleen: Normal in size with a homogeneous echotexture, measuring 8.4 x 2.4 cm.     Pancreas: The visualized portions of pancreas appear normal.     Miscellaneous: No ascites.     Impression:     Stones and sludge in the gallbladder without findings of acute cholecystitis.     CT ABDOMEN:     Liver: The liver is normal in size and attenuation.  No focal hepatic abnormality.     Gallbladder/Bile ducts: Multiple nitrogen containing gallstones are present within the lumen of the gallbladder.  Nonspecific pericholecystic fluid.  No evidence of significant wall  thickening or pericholecystic fat stranding.  No intrahepatic or extrahepatic biliary ductal dilatation.     Spleen:No focal splenic mass.  Accessory splenule noted.     Stomach: Unremarkable.     Pancreas: No focal pancreatic mass or peripancreatic inflammatory change.  Pancreatic duct is within normal limits.     Adrenals: Unremarkable.     Renal/Ureters: The kidneys are normal in size and location. No hydronephrosis. No solid enhancing mass or renal stones.  Visualized ureters are normal.     Bowel: The visualized loops of small and large bowel show no evidence of obstruction or inflammation.  Ingested medications are present within the transverse and descending colons.  Moderate burden of stool throughout the colon.     Peritoneum: no ascites, free fluid, or intraperitoneal free air.     Lymph Nodes: Few scattered mesenteric lymph nodes without evidence of pathologic enlargement.     Aorta: The abdominal aorta is normal in course and caliber.  No  atherosclerotic calcifications.     Bones: Degenerative changes of the spine.  No acute fractures or osseous destructive lesions.     Soft Tissues: the extraperitoneal soft tissues are unremarkable.     Impression:     Nitrogen containing cholelithiasis with small volume of nonspecific pericholecystic fluid.  No evidence of wall thickening or pericholecystic fat stranding.  If there is clinical concern for cholecystitis, recommend HIDA scan.     Few scattered prominent mesenteric lymph nodes without evidence of pathologic lymphadenopathy.     ASSESSMENT/PLAN:      A/P:  Ilene Garber is a 46 y.o. female with symptomatic cholelithiasis, I explained the rationale for Lap CCY and she agreed to proceed. Wrt her symptoms we did discuss that she may still have persistent symptoms attributable to her GERD. If these are not relieved she will still need an EGD     - To OR for Lap Kailey  - Risks, benefits, and alternatives were discussed with the patient, and full informed  consent was obtained in clinic.      Amy Castro MD  PGY- 5 General Surgery   [phone]

## 2022-02-07 NOTE — ANESTHESIA POSTPROCEDURE EVALUATION
Anesthesia Post Evaluation    Patient: Ilene Garber    Procedure(s) Performed: Procedure(s) (LRB):  CHOLECYSTECTOMY, LAPAROSCOPIC (N/A)    Final Anesthesia Type: general      Patient location during evaluation: PACU  Patient participation: Yes- Able to Participate  Level of consciousness: awake (awakens to name)  Post-procedure vital signs: reviewed and stable  Pain management: adequate  Airway patency: patent    PONV status at discharge: No PONV  Anesthetic complications: no      Cardiovascular status: blood pressure returned to baseline  Respiratory status: unassisted  Hydration status: euvolemic  Follow-up not needed.  Comments: Pt somnolent after fentanyl, but reported better control of pain.           Vitals Value Taken Time   /91 02/07/22 1347   Temp 36.5 °C (97.7 °F) 02/07/22 1138   Pulse 68 02/07/22 1359   Resp 16 02/07/22 1359   SpO2 100 % 02/07/22 1359   Vitals shown include unvalidated device data.      No case tracking events are documented in the log.      Pain/Onofre Score: Pain Rating Prior to Med Admin: 8 (2/7/2022  1:05 PM)  Onofre Score: 9 (2/7/2022  1:47 PM)

## 2022-02-07 NOTE — ANESTHESIA PROCEDURE NOTES
Intubation    Date/Time: 2/7/2022 10:13 AM  Performed by: Margot Vasquez CRNA  Authorized by: Raul Romo MD     Intubation:     Induction:  Intravenous    Intubated:  Postinduction    Mask Ventilation:  Easy mask    Attempts:  2    Attempted By:  Student    Method of Intubation:  Direct    Blade:  Ding 2    Laryngeal View Grade: Grade IV - neither epiglottis nor glottis seen      Attempted By (2nd Attempt):  Student    Method of Intubation (2nd Attempt):  Video laryngoscopy    Blade (2nd Attempt):  Cortés 3    Laryngeal View Grade (2nd Attempt): Grade I - full view of cords      Difficult Airway Encountered?: No      Complications:  None    Airway Device:  Oral endotracheal tube    Airway Device Size:  7.0    Style/Cuff Inflation:  Cuffed (inflated to minimal occlusive pressure)    Tube secured:  21    Secured at:  The lips    Placement Verified By:  Capnometry    Complicating Factors:  Anterior larynx    Findings Post-Intubation:  BS equal bilateral and atraumatic/condition of teeth unchanged

## 2022-02-08 NOTE — OP NOTE
DATE OF PROCEDURE: 02/07/2022  SERVICE: General Surgery.   Surgeon(s) and Role:     * Chau Thomason Jr., MD - Primary     * Felecia Castro MD - Resident - Assisting  PREOPERATIVE DIAGNOSIS: Symptomatic Cholelithiasis  POSTOPERATIVE DIAGNOSIS: Same.   PROCEDURE: Lap delilah.   ANESTHESIA: General endotracheal and local.   DESCRIPTION OF PROCEDURE: The patient was taken to the Operating Room, placed   under general anesthesia, prepped and draped in sterile fashion. At this time,   incision was made supraumbilically after infiltrating with local anesthetic.   This was carried down with electrocautery and blunt dissection to the linea   alba. Each side of the linea alba was grasped with Kocher clamp and elevated.   This was opened sharply in the midline and intraabdominal access was obtained.   At this time, an 0 Vicryl suture was placed in the fascia of this incision;   however, it was not tied down, and a 12-mm trocar was placed into the abdominal   cavity. At this time, pneumoperitoneum was obtained. Further ports were then   placed including an epigastric port, a midclavicular subcostal port on the right   and an anterior axillary subcostal port on the right as well. The patient was   then placed in reverse Trendelenburg and tilted to the left. The gallbladder   was grasped and elevated cephalad and laterally. Similarly, the base was   grasped and pulled inferiorly and laterally. At this time, using blunt   instrumentation, the peritoneum around the gallbladder was opened, exposing the   base of the gallbladder. We continued to take down attachments exposing the   cystic artery and cystic duct until the critical view was obtained showing the   artery entering the gallbladder, the duct entering the gallbladder, the base of   the gallbladder with liver behind it. Once these structures were skeletonized   and a critical view was obtained, each were clipped with two clips proximally   and one distally and these  structures were transected. We then took the   gallbladder off the liver bed using electrocautery.  It was then placed in an   EndoCatch bag and passed off the table. The gallbladder was then inspected.   There were no signs of any abnormalities. Any bleeding was stopped with   electrocautery. The area was irrigated copiously. The clips were inspected and   in good position on the cystic artery and duct. The fluid was suctioned.   There was no further bleeding from the liver bed. The ports were then removed   under direct visualization. The suture had been placed in the fascia. The   incision supraumbilically was tied down closing the fascia of this incision and   the skin of all four ports was closed with 4-0 Monocryl suture in subcuticular   fashion. Mastisol and Steri-Strips were placed. The patient was allowed to   awake from general anesthesia and transferred to bed for transfer to Recovery.   COMPLICATIONS: None.   SPONGE COUNT: Correct.   BLOOD LOSS: 15 mL   FLUIDS: Per Anesthesia.   BLOOD GIVEN: None.   DRAINS: None.   SPECIMENS: Gallbladder.   CONDITION OF PATIENT: Good.   I was present for the entire procedure.

## 2022-02-09 LAB
FINAL PATHOLOGIC DIAGNOSIS: NORMAL
GROSS: NORMAL
Lab: NORMAL

## 2022-02-21 ENCOUNTER — OFFICE VISIT (OUTPATIENT)
Dept: SURGERY | Facility: CLINIC | Age: 47
End: 2022-02-21
Payer: COMMERCIAL

## 2022-02-21 VITALS
DIASTOLIC BLOOD PRESSURE: 88 MMHG | HEART RATE: 78 BPM | HEIGHT: 66 IN | BODY MASS INDEX: 31.36 KG/M2 | WEIGHT: 195.13 LBS | SYSTOLIC BLOOD PRESSURE: 137 MMHG

## 2022-02-21 DIAGNOSIS — Z09 POSTOP CHECK: Primary | ICD-10-CM

## 2022-02-21 PROBLEM — K80.20 CALCULUS OF GALLBLADDER WITHOUT CHOLECYSTITIS WITHOUT OBSTRUCTION: Status: RESOLVED | Noted: 2022-02-07 | Resolved: 2022-02-21

## 2022-02-21 PROCEDURE — 3008F PR BODY MASS INDEX (BMI) DOCUMENTED: ICD-10-PCS | Mod: CPTII,S$GLB,, | Performed by: SURGERY

## 2022-02-21 PROCEDURE — 1159F PR MEDICATION LIST DOCUMENTED IN MEDICAL RECORD: ICD-10-PCS | Mod: CPTII,S$GLB,, | Performed by: SURGERY

## 2022-02-21 PROCEDURE — 3075F SYST BP GE 130 - 139MM HG: CPT | Mod: CPTII,S$GLB,, | Performed by: SURGERY

## 2022-02-21 PROCEDURE — 3008F BODY MASS INDEX DOCD: CPT | Mod: CPTII,S$GLB,, | Performed by: SURGERY

## 2022-02-21 PROCEDURE — 99024 POSTOP FOLLOW-UP VISIT: CPT | Mod: S$GLB,,, | Performed by: SURGERY

## 2022-02-21 PROCEDURE — 1160F PR REVIEW ALL MEDS BY PRESCRIBER/CLIN PHARMACIST DOCUMENTED: ICD-10-PCS | Mod: CPTII,S$GLB,, | Performed by: SURGERY

## 2022-02-21 PROCEDURE — 1160F RVW MEDS BY RX/DR IN RCRD: CPT | Mod: CPTII,S$GLB,, | Performed by: SURGERY

## 2022-02-21 PROCEDURE — 3075F PR MOST RECENT SYSTOLIC BLOOD PRESS GE 130-139MM HG: ICD-10-PCS | Mod: CPTII,S$GLB,, | Performed by: SURGERY

## 2022-02-21 PROCEDURE — 99024 PR POST-OP FOLLOW-UP VISIT: ICD-10-PCS | Mod: S$GLB,,, | Performed by: SURGERY

## 2022-02-21 PROCEDURE — 3079F DIAST BP 80-89 MM HG: CPT | Mod: CPTII,S$GLB,, | Performed by: SURGERY

## 2022-02-21 PROCEDURE — 3079F PR MOST RECENT DIASTOLIC BLOOD PRESSURE 80-89 MM HG: ICD-10-PCS | Mod: CPTII,S$GLB,, | Performed by: SURGERY

## 2022-02-21 PROCEDURE — 99999 PR PBB SHADOW E&M-EST. PATIENT-LVL III: ICD-10-PCS | Mod: PBBFAC,,, | Performed by: SURGERY

## 2022-02-21 PROCEDURE — 1159F MED LIST DOCD IN RCRD: CPT | Mod: CPTII,S$GLB,, | Performed by: SURGERY

## 2022-02-21 PROCEDURE — 99999 PR PBB SHADOW E&M-EST. PATIENT-LVL III: CPT | Mod: PBBFAC,,, | Performed by: SURGERY

## 2022-03-02 ENCOUNTER — HOSPITAL ENCOUNTER (OUTPATIENT)
Dept: RADIOLOGY | Facility: HOSPITAL | Age: 47
Discharge: HOME | End: 2022-03-02
Attending: INTERNAL MEDICINE
Payer: COMMERCIAL

## 2022-03-02 VITALS — HEIGHT: 67 IN | WEIGHT: 194 LBS | BODY MASS INDEX: 30.45 KG/M2

## 2022-03-02 DIAGNOSIS — Z12.31 SCREENING MAMMOGRAM, ENCOUNTER FOR: ICD-10-CM

## 2022-03-02 PROCEDURE — 77067 SCR MAMMO BI INCL CAD: CPT | Mod: TC,PN

## 2022-03-02 PROCEDURE — 77067 SCR MAMMO BI INCL CAD: CPT | Mod: 26,,, | Performed by: RADIOLOGY

## 2022-03-02 PROCEDURE — 77063 MAMMO DIGITAL SCREENING BILAT WITH TOMO: ICD-10-PCS | Mod: 26,,, | Performed by: RADIOLOGY

## 2022-03-02 PROCEDURE — 77063 BREAST TOMOSYNTHESIS BI: CPT | Mod: TC,PN

## 2022-03-02 PROCEDURE — 77063 BREAST TOMOSYNTHESIS BI: CPT | Mod: 26,,, | Performed by: RADIOLOGY

## 2022-03-02 PROCEDURE — 77067 MAMMO DIGITAL SCREENING BILAT WITH TOMO: ICD-10-PCS | Mod: 26,,, | Performed by: RADIOLOGY

## 2022-03-03 NOTE — PROGRESS NOTES
I sent pt a my chart message -  I reviewed your Mammogram -   The breasts have scattered areas of fibroglandular density. There is no evidence of suspicious masses, microcalcifications or architectural distortion.  Impression:   No mammographic evidence of malignancy  Routine screening mammogram in 1 year is recommended.    Dr. CONTRERAS

## 2022-03-18 ENCOUNTER — LAB VISIT (OUTPATIENT)
Dept: LAB | Facility: OTHER | Age: 47
End: 2022-03-18
Payer: COMMERCIAL

## 2022-03-18 DIAGNOSIS — Z20.822 ENCOUNTER FOR LABORATORY TESTING FOR COVID-19 VIRUS: ICD-10-CM

## 2022-03-18 LAB
SARS-COV-2 RNA RESP QL NAA+PROBE: NOT DETECTED
SARS-COV-2- CYCLE NUMBER: NORMAL

## 2022-03-18 PROCEDURE — U0003 INFECTIOUS AGENT DETECTION BY NUCLEIC ACID (DNA OR RNA); SEVERE ACUTE RESPIRATORY SYNDROME CORONAVIRUS 2 (SARS-COV-2) (CORONAVIRUS DISEASE [COVID-19]), AMPLIFIED PROBE TECHNIQUE, MAKING USE OF HIGH THROUGHPUT TECHNOLOGIES AS DESCRIBED BY CMS-2020-01-R: HCPCS | Performed by: EMERGENCY MEDICINE

## 2022-04-03 ENCOUNTER — PATIENT MESSAGE (OUTPATIENT)
Dept: GASTROENTEROLOGY | Facility: CLINIC | Age: 47
End: 2022-04-03
Payer: COMMERCIAL

## 2022-04-07 ENCOUNTER — LAB VISIT (OUTPATIENT)
Dept: LAB | Facility: HOSPITAL | Age: 47
End: 2022-04-07
Attending: INTERNAL MEDICINE
Payer: COMMERCIAL

## 2022-04-07 DIAGNOSIS — K29.70 HELICOBACTER PYLORI GASTRITIS: ICD-10-CM

## 2022-04-07 DIAGNOSIS — B96.81 HELICOBACTER PYLORI GASTRITIS: ICD-10-CM

## 2022-04-07 PROCEDURE — 87338 HPYLORI STOOL AG IA: CPT | Performed by: INTERNAL MEDICINE

## 2022-04-15 LAB
H PYLORI AG STL QL IA: NOT DETECTED
SPECIMEN SOURCE: NORMAL

## 2022-05-06 ENCOUNTER — LAB VISIT (OUTPATIENT)
Dept: LAB | Facility: OTHER | Age: 47
End: 2022-05-06
Payer: COMMERCIAL

## 2022-05-06 DIAGNOSIS — Z20.822 ENCOUNTER FOR LABORATORY TESTING FOR COVID-19 VIRUS: ICD-10-CM

## 2022-05-06 LAB
SARS-COV-2 RNA RESP QL NAA+PROBE: NOT DETECTED
SARS-COV-2- CYCLE NUMBER: NORMAL

## 2022-05-06 PROCEDURE — U0003 INFECTIOUS AGENT DETECTION BY NUCLEIC ACID (DNA OR RNA); SEVERE ACUTE RESPIRATORY SYNDROME CORONAVIRUS 2 (SARS-COV-2) (CORONAVIRUS DISEASE [COVID-19]), AMPLIFIED PROBE TECHNIQUE, MAKING USE OF HIGH THROUGHPUT TECHNOLOGIES AS DESCRIBED BY CMS-2020-01-R: HCPCS | Performed by: EMERGENCY MEDICINE

## 2022-05-20 ENCOUNTER — LAB VISIT (OUTPATIENT)
Dept: LAB | Facility: HOSPITAL | Age: 47
End: 2022-05-20
Attending: INTERNAL MEDICINE
Payer: COMMERCIAL

## 2022-05-20 DIAGNOSIS — E61.1 IRON DEFICIENCY: ICD-10-CM

## 2022-05-20 DIAGNOSIS — E55.9 VITAMIN D INSUFFICIENCY: ICD-10-CM

## 2022-05-20 LAB — 25(OH)D3+25(OH)D2 SERPL-MCNC: 24 NG/ML (ref 30–96)

## 2022-05-20 PROCEDURE — 36415 COLL VENOUS BLD VENIPUNCTURE: CPT | Mod: PN | Performed by: INTERNAL MEDICINE

## 2022-05-20 PROCEDURE — 82306 VITAMIN D 25 HYDROXY: CPT | Performed by: INTERNAL MEDICINE

## 2022-05-21 DIAGNOSIS — E55.9 VITAMIN D INSUFFICIENCY: Primary | ICD-10-CM

## 2022-05-21 RX ORDER — ACETAMINOPHEN 500 MG
4000 TABLET ORAL DAILY
Qty: 180 CAPSULE | Refills: 3 | Status: SHIPPED | OUTPATIENT
Start: 2022-05-21 | End: 2023-05-21

## 2022-05-21 RX ORDER — ERGOCALCIFEROL 1.25 MG/1
50000 CAPSULE ORAL
Qty: 12 CAPSULE | Refills: 0 | Status: SHIPPED | OUTPATIENT
Start: 2022-05-21 | End: 2022-08-07

## 2022-05-21 NOTE — PROGRESS NOTES
Saad - Please tell patient that their Vitamin D level is low and recommend that they take Vitamin D 50,000 units ONCE A WEEK (every 7-days) for 12 weeks AND THEN start Vitamin D3 (4,000 units) over-the-counter ONCE DAILY.     Saad - please recheck their vitamin D level in 6 months - Orders placed.

## 2022-05-25 ENCOUNTER — TELEPHONE (OUTPATIENT)
Dept: GASTROENTEROLOGY | Facility: CLINIC | Age: 47
End: 2022-05-25
Payer: COMMERCIAL

## 2022-05-25 NOTE — TELEPHONE ENCOUNTER
Contact and spoke with patient per Saad Hastings - Please tell patient that their Vitamin D level is low and recommend that they take Vitamin D 50,000 units ONCE A WEEK (every 7-days) for 12 weeks AND THEN start Vitamin D3 (4,000 units) over-the-counter ONCE DAILY.     Saad - please recheck their vitamin D level in 6 months - Orders placed.         Patient verbalized understanding.

## 2022-05-25 NOTE — TELEPHONE ENCOUNTER
----- Message from Cirilo Tate MD sent at 5/21/2022 10:11 AM CDT -----  Saad - Please tell patient that their Vitamin D level is low and recommend that they take Vitamin D 50,000 units ONCE A WEEK (every 7-days) for 12 weeks AND THEN start Vitamin D3 (4,000 units) over-the-counter ONCE DAILY.     Saad - please recheck their vitamin D level in 6 months - Orders placed.

## 2022-06-09 ENCOUNTER — TELEPHONE (OUTPATIENT)
Dept: GASTROENTEROLOGY | Facility: CLINIC | Age: 47
End: 2022-06-09
Payer: COMMERCIAL

## 2022-06-09 ENCOUNTER — PATIENT MESSAGE (OUTPATIENT)
Dept: GASTROENTEROLOGY | Facility: CLINIC | Age: 47
End: 2022-06-09
Payer: COMMERCIAL

## 2022-06-09 NOTE — TELEPHONE ENCOUNTER
Contact patient by phone to inform that we will have to cancel appointment with Dr. Tate due to he will not be in clinic on that date. No answer left message on [facility]. Sent a message to the patient portal.

## 2022-06-24 ENCOUNTER — PATIENT MESSAGE (OUTPATIENT)
Dept: GASTROENTEROLOGY | Facility: CLINIC | Age: 47
End: 2022-06-24
Payer: COMMERCIAL

## 2022-06-28 ENCOUNTER — TELEPHONE (OUTPATIENT)
Dept: GASTROENTEROLOGY | Facility: CLINIC | Age: 47
End: 2022-06-28
Payer: COMMERCIAL

## 2022-06-28 NOTE — TELEPHONE ENCOUNTER
----- Message from Dede Yost MA sent at 6/27/2022 10:59 AM CDT -----    ----- Message -----  From: Venkat Posey  Sent: 6/27/2022   7:32 AM CDT  To: [facility] Staff    Name of Who is Calling: ABBIE SAPP         What is the request in detail: The patient is calling to schedule an appointment. Please advise          Can the clinic reply by [facility]: Yes         What Number to Call Back if not in [facility][phone]

## 2022-06-28 NOTE — TELEPHONE ENCOUNTER
Return call and spoke with patient. Inform patient that there are no appointments at this time will call back to schedule next available also encourage patient to check the patient portal for any appointment.      Patient verbalized understanding.

## 2022-07-12 ENCOUNTER — ANESTHESIA (OUTPATIENT)
Dept: ENDOSCOPY | Facility: HOSPITAL | Age: 47
End: 2022-07-12
Payer: COMMERCIAL

## 2022-07-12 ENCOUNTER — ANESTHESIA EVENT (OUTPATIENT)
Dept: ENDOSCOPY | Facility: HOSPITAL | Age: 47
End: 2022-07-12
Payer: COMMERCIAL

## 2022-07-12 ENCOUNTER — HOSPITAL ENCOUNTER (OUTPATIENT)
Facility: HOSPITAL | Age: 47
Discharge: HOME | End: 2022-07-12
Attending: INTERNAL MEDICINE | Admitting: INTERNAL MEDICINE
Payer: COMMERCIAL

## 2022-07-12 VITALS
HEART RATE: 74 BPM | RESPIRATION RATE: 16 BRPM | BODY MASS INDEX: 30.61 KG/M2 | TEMPERATURE: 98 F | OXYGEN SATURATION: 100 % | HEIGHT: 67 IN | WEIGHT: 195 LBS | DIASTOLIC BLOOD PRESSURE: 95 MMHG | SYSTOLIC BLOOD PRESSURE: 152 MMHG

## 2022-07-12 DIAGNOSIS — R10.33 PERIUMBILICAL PAIN: Primary | ICD-10-CM

## 2022-07-12 DIAGNOSIS — K22.10 EROSIVE ESOPHAGITIS: ICD-10-CM

## 2022-07-12 DIAGNOSIS — K22.10 EROSIVE ESOPHAGITIS: Primary | ICD-10-CM

## 2022-07-12 DIAGNOSIS — R19.00 REDUCIBLE BULGE OF ABDOMINAL WALL: ICD-10-CM

## 2022-07-12 LAB
B-HCG UR QL: NEGATIVE
CTP QC/QA: YES

## 2022-07-12 PROCEDURE — 25000003 PHARM REV CODE 250: Performed by: NURSE ANESTHETIST, CERTIFIED REGISTERED

## 2022-07-12 PROCEDURE — E9220 PRA ENDO ANESTHESIA: ICD-10-PCS | Mod: ,,, | Performed by: NURSE ANESTHETIST, CERTIFIED REGISTERED

## 2022-07-12 PROCEDURE — 43235 EGD DIAGNOSTIC BRUSH WASH: CPT | Performed by: INTERNAL MEDICINE

## 2022-07-12 PROCEDURE — 37000008 HC ANESTHESIA 1ST 15 MINUTES: Performed by: INTERNAL MEDICINE

## 2022-07-12 PROCEDURE — 63600175 PHARM REV CODE 636 W HCPCS: Performed by: NURSE ANESTHETIST, CERTIFIED REGISTERED

## 2022-07-12 PROCEDURE — 37000009 HC ANESTHESIA EA ADD 15 MINS: Performed by: INTERNAL MEDICINE

## 2022-07-12 PROCEDURE — E9220 PRA ENDO ANESTHESIA: HCPCS | Mod: ,,, | Performed by: NURSE ANESTHETIST, CERTIFIED REGISTERED

## 2022-07-12 PROCEDURE — 81025 URINE PREGNANCY TEST: CPT | Performed by: INTERNAL MEDICINE

## 2022-07-12 PROCEDURE — 43235 EGD DIAGNOSTIC BRUSH WASH: CPT | Mod: ,,, | Performed by: INTERNAL MEDICINE

## 2022-07-12 PROCEDURE — 25000003 PHARM REV CODE 250: Performed by: INTERNAL MEDICINE

## 2022-07-12 PROCEDURE — 43235 PR EGD, FLEX, DIAGNOSTIC: ICD-10-PCS | Mod: ,,, | Performed by: INTERNAL MEDICINE

## 2022-07-12 RX ORDER — SODIUM CHLORIDE 9 MG/ML
INJECTION, SOLUTION INTRAVENOUS CONTINUOUS
Status: DISCONTINUED | OUTPATIENT
Start: 2022-07-12 | End: 2022-07-12 | Stop reason: HOSPADM

## 2022-07-12 RX ORDER — LIDOCAINE HYDROCHLORIDE 20 MG/ML
INJECTION INTRAVENOUS
Status: DISCONTINUED | OUTPATIENT
Start: 2022-07-12 | End: 2022-07-12

## 2022-07-12 RX ORDER — PROPOFOL 10 MG/ML
VIAL (ML) INTRAVENOUS
Status: DISCONTINUED | OUTPATIENT
Start: 2022-07-12 | End: 2022-07-12

## 2022-07-12 RX ADMIN — LIDOCAINE HYDROCHLORIDE 100 MG: 20 INJECTION, SOLUTION INTRAVENOUS at 11:07

## 2022-07-12 RX ADMIN — SODIUM CHLORIDE: 0.9 INJECTION, SOLUTION INTRAVENOUS at 10:07

## 2022-07-12 RX ADMIN — PROPOFOL 50 MG: 10 INJECTION, EMULSION INTRAVENOUS at 11:07

## 2022-07-12 RX ADMIN — SODIUM CHLORIDE: 9 INJECTION, SOLUTION INTRAVENOUS at 10:07

## 2022-07-12 RX ADMIN — PROPOFOL 100 MG: 10 INJECTION, EMULSION INTRAVENOUS at 11:07

## 2022-07-12 NOTE — PROVATION PATIENT INSTRUCTIONS
Discharge Summary/Instructions after an Endoscopic Procedure  Patient Name: Ilene Garber  Patient MRN: [id number]  Patient YOB: 1975 Tuesday, July 12, 2022  Cirilo Tate MD  Dear patient,  As a result of recent federal legislation (The Federal Cures Act), you may   receive lab or pathology results from your procedure in your [facility]   account before your physician is able to contact you. Your physician or   their representative will relay the results to you with their   recommendations at their soonest availability.  Thank you,  RESTRICTIONS:  During your procedure today, you received medications for sedation.  These   medications may affect your judgment, balance and coordination.  Therefore,   for 24 hours, you have the following restrictions:   - DO NOT drive a car, operate machinery, make legal/financial decisions,   sign important papers or drink alcohol.    ACTIVITY:  Today: no heavy lifting, straining or running due to procedural   sedation/anesthesia.  The following day: return to full activity including work.  DIET:  Eat and drink normally unless instructed otherwise.     TREATMENT FOR COMMON SIDE EFFECTS:  - Mild abdominal pain, nausea, belching, bloating or excessive gas:  rest,   eat lightly and use a heating pad.  - Sore Throat: treat with throat lozenges and/or gargle with warm salt   water.  - Because air was used during the procedure, expelling large amounts of air   from your rectum or belching is normal.  - If a bowel prep was taken, you may not have a bowel movement for 1-3 days.    This is normal.  SYMPTOMS TO WATCH FOR AND REPORT TO YOUR PHYSICIAN:  1. Abdominal pain or bloating, other than gas cramps.  2. Chest pain.  3. Back pain.  4. Signs of infection such as: chills or fever occurring within 24 hours   after the procedure.  5. Rectal bleeding, which would show as bright red, maroon, or black stools.   (A tablespoon of blood from the rectum is not serious, especially if    hemorrhoids are present.)  6. Vomiting.  7. Weakness or dizziness.  GO DIRECTLY TO THE NEAREST EMERGENCY ROOM IF YOU HAVE ANY OF THE FOLLOWING:      Difficulty breathing              Chills and/or fever over 101 F   Persistent vomiting and/or vomiting blood   Severe abdominal pain   Severe chest pain   Black, tarry stools   Bleeding- more than one tablespoon   Any other symptom or condition that you feel may need urgent attention  Your doctor recommends these additional instructions:  If any biopsies were taken, your doctors clinic will contact you in 1 to 2   weeks with any results.  - Discharge patient to home.   - Follow an antireflux regimen indefinitely.   - Continue present medications.   - Return to primary care physician.   - Return to GI clinic at the next available appointment.   - The findings and recommendations were discussed with the patient.  For questions, problems or results please call your physician - Cirilo Tate MD at Work:  [phone].  [facility] [place], EMERGENCY ROOM PHONE NUMBER: [phone]  IF A COMPLICATION OR EMERGENCY SITUATION ARISES AND YOU ARE UNABLE TO REACH   YOUR PHYSICIAN - GO DIRECTLY TO THE EMERGENCY ROOM.  Cirilo Tate MD  7/12/2022 11:20:20 AM  This report has been verified and signed electronically.  Dear patient,  As a result of recent federal legislation (The Federal Cures Act), you may   receive lab or pathology results from your procedure in your [facility]   account before your physician is able to contact you. Your physician or   their representative will relay the results to you with their   recommendations at their soonest availability.  Thank you,  PROVATION

## 2022-07-12 NOTE — ANESTHESIA PREPROCEDURE EVALUATION
[facility]  Anesthesia Pre-Operative Evaluation         Patient Name: Ilene Garber  YOB: 1975  MRN: [id number]    SUBJECTIVE:     07/12/2022    Procedure(s) (LRB):  CHOLECYSTECTOMY, LAPAROSCOPIC (N/A)    Ilene Garber is a 46 y.o. female here for above procedure        Patient Active Problem List   Diagnosis    Hypertension    GERD (gastroesophageal reflux disease)    Mild intermittent asthma without complication    Class 1 obesity due to excess calories with serious comorbidity and body mass index (BMI) of 30.0 to 30.9 in adult    Musculoskeletal chest pain       Review of patient's allergies indicates:  No Known Allergies    Current Facility-Administered Medications on File Prior to Visit   Medication Dose Route Frequency Provider Last Rate Last Admin    0.9%  NaCl infusion   Intravenous Continuous Cirilo Tate MD 10 mL/hr at 07/12/22 1003 New Bag at 07/12/22 1003     Current Outpatient Medications on File Prior to Visit   Medication Sig Dispense Refill    albuterol (PROVENTIL) 5 mg/mL nebulizer solution Inhale 2 puffs into the lungs.      cholecalciferol, vitamin D3, (VITAMIN D3) 50 mcg (2,000 unit) Cap capsule Take 2 capsules (4,000 Units total) by mouth once daily. 180 capsule 3    ergocalciferol (ERGOCALCIFEROL) 50,000 unit Cap Take 1 capsule (50,000 Units total) by mouth every 7 days. for 12 doses 12 capsule 0    fluticasone propionate (FLONASE) 50 mcg/actuation nasal spray 2 sprays (100 mcg total) by Each Nostril route once daily. 18.2 mL 8    pantoprazole (PROTONIX) 40 MG tablet TAKE 1 TABLET BY MOUTH EVERY 12 HOURS 180 tablet 0    spironolactone (ALDACTONE) 25 MG tablet Take 1 tablet (25 mg total) by mouth once daily. 90 tablet 3    tiZANidine (ZANAFLEX) 4 MG tablet 1 po QHS prn back pain 30 tablet 0    valACYclovir (VALTREX) 500 MG tablet TAKE 1 TABLET BY MOUTH TWICE DAILY FOR 3 DAYS AS NEEDED FOR  FLARE 60 tablet 0       Past Surgical History:    Procedure Laterality Date    BILATERAL TUBAL LIGATION      BREAST BIOPSY Left     benign     SECTION      x 3    COLONOSCOPY N/A 2022    Procedure: COLONOSCOPY;  Surgeon: Cirilo Tate MD;  [facility]  Service: Endoscopy;  Laterality: N/A;  Constipation bowel prep    ESOPHAGOGASTRODUODENOSCOPY N/A 2022    Procedure: EGD (ESOPHAGOGASTRODUODENOSCOPY);  Surgeon: Cirilo Tate MD;  [facility]  Service: Endoscopy;  Laterality: N/A;  COVID test at [place] on -    LAPAROSCOPIC CHOLECYSTECTOMY N/A 2022    Procedure: CHOLECYSTECTOMY, LAPAROSCOPIC;  Surgeon: Chau Thomason Jr., MD;  Location: [facility] OR [address] [place] [address];  Service: General;  Laterality: N/A;    LAPAROSCOPY LYSIS OF ADHESIONS  2018       Social History     Socioeconomic History    Marital status:    Tobacco Use    Smoking status: Never Smoker    Smokeless tobacco: Never Used   Substance and Sexual Activity    Alcohol use: Yes     Comment: Just Friday two [place] Ice teas    Drug use: Never    Sexual activity: Yes     Partners: Male     Birth control/protection: See Surgical Hx     Comment: Bilateral to ligation   Social History Narrative    She is     She works as a Nurse         OBJECTIVE:     Vital Signs Range (Last 24H):       Significant Labs:  Lab Results   Component Value Date    WBC 4.20 2021    HGB 12.5 2022    HCT 43.1 2021     2021    CHOL 151 2022    TRIG 93 2022    HDL 35 (L) 2022    ALT 9 (L) 2022    AST 14 2022     2022    K 4.1 2022     2022    CREATININE 1.0 2022    BUN 8 2022    CO2 20 (L) 2022    TSH 1.510 2022           EKG:   Results for orders placed or performed during the hospital encounter of 21   SCHEDULED EKG 12-LEAD (to Muse)    Collection Time: 21 10:48 AM    Narrative    Test Reason :  K80.20,    Vent. Rate : 071 BPM     Atrial Rate : 071 BPM     P-R Int : 162 ms          QRS Dur : 076 ms      QT Int : 380 ms       P-R-T Axes : 056 037 038 degrees     QTc Int : 412 ms    Normal sinus rhythm  Normal ECG  No previous ECGs available  Confirmed by Mt Sagastume MD ([id number]) on 12/29/2021 2:43:35 PM    Referred By: HENRIQUE MENDOZA           Confirmed By:Mt Sagastume MD       Prior airway:   11/28/18; 1128; Direct laryngoscopy, Stylet; Oral Standard; No; 6.5 mm; Cuffed; Auscultation, Capnometry, Symmetrical chest wall movement; Paper tape; Momo; 3;     Pre-op Assessment    I have reviewed the Patient Summary Reports.    I have reviewed the Nursing Notes. I have reviewed the NPO Status.      Review of Systems  Anesthesia Hx:  Hx of Anesthetic complications Nausea with some anesthetics, but not all past surgeries  History of prior surgery of interest to airway management or planning: Personal Hx of Anesthesia complications, Post-Operative Nausea/Vomiting   Social:  Non-Smoker, Alcohol Use    Hematology/Oncology:     Oncology Normal    -- Anemia (Iron supplementation):   EENT/Dental:   chronic allergic rhinitis   Cardiovascular:   Exercise tolerance: good Hypertension Denies MI.      Pulmonary:   Asthma (asccoiated with allergies)    Renal/:  Renal/ Normal     Hepatic/GI:   GERD Gallstones with associated pain, nausea, and vomiting. Has pain morning of surgery.  Nausea controlled.  Last episode of vomiting on 2/4.    Musculoskeletal:  Musculoskeletal Normal    Neurological:  Neurology Normal Denies TIA.  Denies CVA. Denies Seizures.    Endocrine:   Obesity, BMI 32.6  Vit D deficiency    Dermatological:  Skin Normal        Physical Exam  General:  Well nourished, Cooperative and Alert      Airway/Jaw/Neck:  Airway Findings: Mouth Opening: Normal   General Airway Assessment: Adult Mallampati: II  TM Distance: Normal, at least 6 cm   Jaw/Neck Findings:  Neck ROM: Normal ROM       Dental:  Dental  Findings: In tact     Chest/Lungs:  Chest/Lungs Findings: Clear to auscultation, Normal Respiratory Rate      Heart/Vascular:  Heart Findings: Rate: Normal  Rhythm: Regular Rhythm  Sounds: Normal        Mental Status:  Mental Status Findings:  Cooperative, Alert and Oriented, Anxious         Anesthesia Plan  Type of Anesthesia, risks & benefits discussed:  Anesthesia Type:  Gen Natural Airway    Patient's Preference:   Plan Factors:          Intra-op Monitoring Plan: standard ASA monitors and Standard ASA Monitors  Intra-op Monitoring Plan Comments:   Post Op Pain Control Plan: IV/PO Opioids PRN, per primary service following discharge from PACU and multimodal analgesia  Post Op Pain Control Plan Comments:     Induction:   IV  Beta Blocker:  Patient is not currently on a Beta-Blocker (No further documentation required).       Informed Consent: Informed consent signed with the Patient and all parties understand the risks and agree with anesthesia plan.  All questions answered.  Anesthesia consent signed with patient.  ASA Score: 2     Day of Surgery Review of History & Physical:        Anesthesia Plan Notes:   NPO: Last ate 14:00 on 2/6/2022.     Meds: None on day of surgery.     Plan for anti-emetic prophylaxis with scopolamine and IV agents intra-op.           Ready For Surgery From Anesthesia Perspective.           Physical Exam  General: Well nourished, Cooperative and Alert    Airway:  Mallampati: II   Mouth Opening: Normal  TM Distance: Normal, at least 6 cm  Neck ROM: Normal ROM    Dental:  In tact    Chest/Lungs:  Clear to auscultation, Normal Respiratory Rate    Heart:  Rate: Normal  Rhythm: Regular Rhythm  Sounds: Normal          Anesthesia Plan  Type of Anesthesia, risks & benefits discussed:    Anesthesia Type: Gen Natural Airway  Intra-op Monitoring Plan: standard ASA monitors and Standard ASA Monitors  Post Op Pain Control Plan: IV/PO Opioids PRN, per primary service following discharge from PACU and  multimodal analgesia  Induction:  IV  Informed Consent: Informed consent signed with the Patient and all parties understand the risks and agree with anesthesia plan.  All questions answered.   ASA Score: 2  Anesthesia Plan Notes:   NPO: Last ate 14:00 on 2/6/2022.     Meds: None on day of surgery.     Plan for anti-emetic prophylaxis with scopolamine and IV agents intra-op.       Ready For Surgery From Anesthesia Perspective.       .

## 2022-07-12 NOTE — H&P
[facility] 4[facility]  History & Physical    Subjective:      Chief Complaint/Reason for Admission: EGD for follow up erosive esophagitis    Ilene Garber is a 46 y.o. female.    Past Medical History:   Diagnosis Date    Asthma     GERD (gastroesophageal reflux disease)     Hypertension     Ovarian cyst     Rotator cuff tear, right      Past Surgical History:   Procedure Laterality Date    BILATERAL TUBAL LIGATION      BREAST BIOPSY Left     benign     SECTION      x 3    COLONOSCOPY N/A 2022    Procedure: COLONOSCOPY;  Surgeon: Cirilo Tate MD;  Location: [facility] ([address] [place] [address]);  Service: Endoscopy;  Laterality: N/A;  Constipation bowel prep    ESOPHAGOGASTRODUODENOSCOPY N/A 2022    Procedure: EGD (ESOPHAGOGASTRODUODENOSCOPY);  Surgeon: Cirilo Tate MD;  Location: [facility] ([address] [place] [address]);  Service: Endoscopy;  Laterality: N/A;  COVID test at [place] on -GT    LAPAROSCOPIC CHOLECYSTECTOMY N/A 2022    Procedure: CHOLECYSTECTOMY, LAPAROSCOPIC;  Surgeon: Chau Thomason Jr., MD;  Location: [address];  Service: General;  Laterality: N/A;    LAPAROSCOPY LYSIS OF ADHESIONS  2018     Family History   Problem Relation Age of Onset    COPD Mother     Coronary artery disease Mother         s/p CABG    Hypertension Maternal Grandmother     Diabetes Maternal Grandmother     Breast cancer Neg Hx     Colon cancer Neg Hx     Ovarian cancer Neg Hx     Celiac disease Neg Hx     Cirrhosis Neg Hx     Crohn's disease Neg Hx     Esophageal cancer Neg Hx     Inflammatory bowel disease Neg Hx     Liver cancer Neg Hx     Liver disease Neg Hx     Rectal cancer Neg Hx     Stomach cancer Neg Hx     Ulcerative colitis Neg Hx     Pancreatic cancer Neg Hx     Kidney cancer Neg Hx     Bladder Cancer Neg Hx     Uterine cancer Neg Hx      Social History     Tobacco Use    Smoking status: Never Smoker    Smokeless tobacco: Never Used   Substance  Use Topics    Alcohol use: Yes     Comment: Just Friday two [place] Ice teas    Drug use: Never       PTA Medications   Medication Sig    cholecalciferol, vitamin D3, (VITAMIN D3) 50 mcg (2,000 unit) Cap capsule Take 2 capsules (4,000 Units total) by mouth once daily.    ergocalciferol (ERGOCALCIFEROL) 50,000 unit Cap Take 1 capsule (50,000 Units total) by mouth every 7 days. for 12 doses    pantoprazole (PROTONIX) 40 MG tablet TAKE 1 TABLET BY MOUTH EVERY 12 HOURS    spironolactone (ALDACTONE) 25 MG tablet Take 1 tablet (25 mg total) by mouth once daily.    tiZANidine (ZANAFLEX) 4 MG tablet 1 po QHS prn back pain    albuterol (PROVENTIL) 5 mg/mL nebulizer solution Inhale 2 puffs into the lungs.    fluticasone propionate (FLONASE) 50 mcg/actuation nasal spray 2 sprays (100 mcg total) by Each Nostril route once daily.    valACYclovir (VALTREX) 500 MG tablet TAKE 1 TABLET BY MOUTH TWICE DAILY FOR 3 DAYS AS NEEDED FOR  FLARE     Review of patient's allergies indicates:  No Known Allergies     Review of Systems   Constitutional: Negative for fever.   Respiratory: Negative for shortness of breath.    Cardiovascular: Negative for chest pain.       Objective:      Vital Signs (Most Recent)  Temp: 97.7 °F (36.5 °C) (07/12/22 1018)  Pulse: 74 (07/12/22 1018)  Resp: 18 (07/12/22 1018)  BP: 125/83 (07/12/22 1018)  SpO2: 99 % (07/12/22 1018)    Vital Signs Range (Last 24H):  Temp:  [97.7 °F (36.5 °C)]   Pulse:  [74]   Resp:  [18]   BP: (125)/(83)   SpO2:  [99 %]     Physical Exam  Cardiovascular:      Rate and Rhythm: Normal rate.   Pulmonary:      Effort: Pulmonary effort is normal.   Abdominal:      Comments: Suspected ventral hernia just supra umbilical   Neurological:      Mental Status: She is alert and oriented to person, place, and time.         Assessment:      There are no hospital problems to display for this patient.      Plan:    EGD for follow up erosive esophagitis

## 2022-07-12 NOTE — TRANSFER OF CARE
"Anesthesia Transfer of Care Note    Patient: Ilene Garber    Procedure(s) Performed: Procedure(s) (LRB):  EGD (ESOPHAGOGASTRODUODENOSCOPY) (N/A)    Patient location: GI    Anesthesia Type: general    Transport from OR: Transported from OR on room air with adequate spontaneous ventilation    Post pain: adequate analgesia    Post assessment: no apparent anesthetic complications    Post vital signs: stable    Level of consciousness: sedated and responds to stimulation    Complications: none    Transfer of care protocol was followed      Last vitals:   Visit Vitals  /93   Pulse 83   Temp 36.5 °C (97.7 °F) (Temporal)   Resp 18   Ht 5' 7" (1.702 m)   Wt 88.5 kg (195 lb)   SpO2 100%   Breastfeeding No   BMI 30.54 kg/m²     "

## 2022-07-12 NOTE — ANESTHESIA POSTPROCEDURE EVALUATION
Anesthesia Post Evaluation    Patient: Ilene Garber    Procedure(s) Performed: Procedure(s) (LRB):  EGD (ESOPHAGOGASTRODUODENOSCOPY) (N/A)    Final Anesthesia Type: general      Patient location during evaluation: PACU  Patient participation: Yes- Able to Participate  Level of consciousness: awake and alert and oriented  Post-procedure vital signs: reviewed and stable  Pain management: adequate  Airway patency: patent    PONV status at discharge: No PONV  Anesthetic complications: no      Cardiovascular status: stable  Respiratory status: unassisted, spontaneous ventilation and room air  Hydration status: euvolemic  Follow-up not needed.          Vitals Value Taken Time   /88 07/12/22 1123   Temp 36.5 °C (97.7 °F) 07/12/22 1118   Pulse 75 07/12/22 1123   Resp 20 07/12/22 1123   SpO2 100 % 07/12/22 1123         No case tracking events are documented in the log.      Pain/Onofre Score: No data recorded

## 2022-07-25 ENCOUNTER — HOSPITAL ENCOUNTER (OUTPATIENT)
Dept: RADIOLOGY | Facility: HOSPITAL | Age: 47
Discharge: HOME | End: 2022-07-25
Attending: INTERNAL MEDICINE
Payer: COMMERCIAL

## 2022-07-25 DIAGNOSIS — R19.00 REDUCIBLE BULGE OF ABDOMINAL WALL: ICD-10-CM

## 2022-07-25 DIAGNOSIS — R10.33 PERIUMBILICAL PAIN: ICD-10-CM

## 2022-07-25 PROCEDURE — 76705 ECHO EXAM OF ABDOMEN: CPT | Mod: TC

## 2022-07-25 PROCEDURE — 76705 US ABDOMEN LIMITED: ICD-10-PCS | Mod: 26,,, | Performed by: RADIOLOGY

## 2022-07-25 PROCEDURE — 76705 ECHO EXAM OF ABDOMEN: CPT | Mod: 26,,, | Performed by: RADIOLOGY

## 2022-07-30 NOTE — PROGRESS NOTES
Ilene your ultrasound looks like you have possibly two hernias please keep your follow-up appointment with Dr. Chau Thomason.    8/10/2022 11:30 AM Chau Thomason Jr., MD [facility] GENERAL SURGERY 2ND FLOOR [phone] [facility]       Impression:     Umbilical hernia with protruding peritoneal fat.  Nonspecific area of heterogeneous complexity in the superficial soft tissues overlying the hernia.  This could potentially represent protruding decompressed bowel loops into the hernia, however no communication to intraperitoneal bowel could be confirmed.     Electronically signed by resident: Ángel Sheridan  Date:                                            07/25/2022  Time:                                           11:06     Electronically signed by: Sean Sharma MD  Date:                                            07/25/2022

## 2022-08-10 ENCOUNTER — OFFICE VISIT (OUTPATIENT)
Dept: SURGERY | Facility: CLINIC | Age: 47
End: 2022-08-10
Payer: COMMERCIAL

## 2022-08-10 ENCOUNTER — LAB VISIT (OUTPATIENT)
Dept: LAB | Facility: HOSPITAL | Age: 47
End: 2022-08-10
Attending: SURGERY
Payer: COMMERCIAL

## 2022-08-10 VITALS
BODY MASS INDEX: 32.53 KG/M2 | HEART RATE: 89 BPM | WEIGHT: 202.38 LBS | DIASTOLIC BLOOD PRESSURE: 81 MMHG | HEIGHT: 66 IN | SYSTOLIC BLOOD PRESSURE: 157 MMHG

## 2022-08-10 DIAGNOSIS — K43.2 INCISIONAL HERNIA, WITHOUT OBSTRUCTION OR GANGRENE: ICD-10-CM

## 2022-08-10 DIAGNOSIS — R19.00 REDUCIBLE BULGE OF ABDOMINAL WALL: ICD-10-CM

## 2022-08-10 LAB
ANION GAP SERPL CALC-SCNC: 7 MMOL/L (ref 8–16)
BASOPHILS # BLD AUTO: 0.05 K/UL (ref 0–0.2)
BASOPHILS NFR BLD: 0.8 % (ref 0–1.9)
BUN SERPL-MCNC: 9 MG/DL (ref 6–20)
CALCIUM SERPL-MCNC: 9.4 MG/DL (ref 8.7–10.5)
CHLORIDE SERPL-SCNC: 103 MMOL/L (ref 95–110)
CO2 SERPL-SCNC: 27 MMOL/L (ref 23–29)
CREAT SERPL-MCNC: 0.9 MG/DL (ref 0.5–1.4)
DIFFERENTIAL METHOD: NORMAL
EOSINOPHIL # BLD AUTO: 0 K/UL (ref 0–0.5)
EOSINOPHIL NFR BLD: 0.6 % (ref 0–8)
ERYTHROCYTE [DISTWIDTH] IN BLOOD BY AUTOMATED COUNT: 13.4 % (ref 11.5–14.5)
EST. GFR  (NO RACE VARIABLE): >60 ML/MIN/1.73 M^2
GLUCOSE SERPL-MCNC: 74 MG/DL (ref 70–110)
HCT VFR BLD AUTO: 38.5 % (ref 37–48.5)
HGB BLD-MCNC: 12.4 G/DL (ref 12–16)
IMM GRANULOCYTES # BLD AUTO: 0.01 K/UL (ref 0–0.04)
IMM GRANULOCYTES NFR BLD AUTO: 0.2 % (ref 0–0.5)
LYMPHOCYTES # BLD AUTO: 2.5 K/UL (ref 1–4.8)
LYMPHOCYTES NFR BLD: 40 % (ref 18–48)
MCH RBC QN AUTO: 28.1 PG (ref 27–31)
MCHC RBC AUTO-ENTMCNC: 32.2 G/DL (ref 32–36)
MCV RBC AUTO: 87 FL (ref 82–98)
MONOCYTES # BLD AUTO: 0.5 K/UL (ref 0.3–1)
MONOCYTES NFR BLD: 8.1 % (ref 4–15)
NEUTROPHILS # BLD AUTO: 3.2 K/UL (ref 1.8–7.7)
NEUTROPHILS NFR BLD: 50.3 % (ref 38–73)
NRBC BLD-RTO: 0 /100 WBC
PLATELET # BLD AUTO: 311 K/UL (ref 150–450)
PMV BLD AUTO: 10.9 FL (ref 9.2–12.9)
POTASSIUM SERPL-SCNC: 3.2 MMOL/L (ref 3.5–5.1)
RBC # BLD AUTO: 4.42 M/UL (ref 4–5.4)
SODIUM SERPL-SCNC: 137 MMOL/L (ref 136–145)
WBC # BLD AUTO: 6.33 K/UL (ref 3.9–12.7)

## 2022-08-10 PROCEDURE — 3079F PR MOST RECENT DIASTOLIC BLOOD PRESSURE 80-89 MM HG: ICD-10-PCS | Mod: CPTII,S$GLB,, | Performed by: SURGERY

## 2022-08-10 PROCEDURE — 3077F SYST BP >= 140 MM HG: CPT | Mod: CPTII,S$GLB,, | Performed by: SURGERY

## 2022-08-10 PROCEDURE — 3008F PR BODY MASS INDEX (BMI) DOCUMENTED: ICD-10-PCS | Mod: CPTII,S$GLB,, | Performed by: SURGERY

## 2022-08-10 PROCEDURE — 1160F RVW MEDS BY RX/DR IN RCRD: CPT | Mod: CPTII,S$GLB,, | Performed by: SURGERY

## 2022-08-10 PROCEDURE — 36415 COLL VENOUS BLD VENIPUNCTURE: CPT | Performed by: SURGERY

## 2022-08-10 PROCEDURE — 1159F MED LIST DOCD IN RCRD: CPT | Mod: CPTII,S$GLB,, | Performed by: SURGERY

## 2022-08-10 PROCEDURE — 99999 PR PBB SHADOW E&M-EST. PATIENT-LVL V: ICD-10-PCS | Mod: PBBFAC,,, | Performed by: SURGERY

## 2022-08-10 PROCEDURE — 99214 OFFICE O/P EST MOD 30 MIN: CPT | Mod: S$GLB,,, | Performed by: SURGERY

## 2022-08-10 PROCEDURE — 80048 BASIC METABOLIC PNL TOTAL CA: CPT | Performed by: SURGERY

## 2022-08-10 PROCEDURE — 3079F DIAST BP 80-89 MM HG: CPT | Mod: CPTII,S$GLB,, | Performed by: SURGERY

## 2022-08-10 PROCEDURE — 1159F PR MEDICATION LIST DOCUMENTED IN MEDICAL RECORD: ICD-10-PCS | Mod: CPTII,S$GLB,, | Performed by: SURGERY

## 2022-08-10 PROCEDURE — 99999 PR PBB SHADOW E&M-EST. PATIENT-LVL V: CPT | Mod: PBBFAC,,, | Performed by: SURGERY

## 2022-08-10 PROCEDURE — 3008F BODY MASS INDEX DOCD: CPT | Mod: CPTII,S$GLB,, | Performed by: SURGERY

## 2022-08-10 PROCEDURE — 99214 PR OFFICE/OUTPT VISIT, EST, LEVL IV, 30-39 MIN: ICD-10-PCS | Mod: S$GLB,,, | Performed by: SURGERY

## 2022-08-10 PROCEDURE — 85025 COMPLETE CBC W/AUTO DIFF WBC: CPT | Performed by: SURGERY

## 2022-08-10 PROCEDURE — 1160F PR REVIEW ALL MEDS BY PRESCRIBER/CLIN PHARMACIST DOCUMENTED: ICD-10-PCS | Mod: CPTII,S$GLB,, | Performed by: SURGERY

## 2022-08-10 PROCEDURE — 3077F PR MOST RECENT SYSTOLIC BLOOD PRESSURE >= 140 MM HG: ICD-10-PCS | Mod: CPTII,S$GLB,, | Performed by: SURGERY

## 2022-08-10 NOTE — PROGRESS NOTES
GENERAL SURGERY CLINIC NOTE    CC:  Incisional hernia    HPI:  Ilene Garber is a 46 y.o. female with Hx of lap delilah earlier this year who presents to clinic for a chalo-umbilical incisional hernia. Patient reported to her PCP with umbilical pain and an US abdomen was obtained demonstrating a fat-containing chalo-umbilical hernia.   She first noticed a bulge about 2 months ago. It will intermittently bulge out and become painful to the touch. She has had an episode of severe pain at the site where she had to take her pain meds leftover from her cholecystectomy. She has never tried to push in the bulge. She has to lie on her back at night when it is painful and bulging.      ROS:   Review of Systems   Constitutional: Negative for chills, fever and malaise/fatigue.   Eyes: Negative for blurred vision and double vision.   Respiratory: Negative for cough and shortness of breath.    Cardiovascular: Negative for chest pain and palpitations.   Gastrointestinal: Negative for abdominal pain, diarrhea, nausea and vomiting.   Genitourinary: Negative for dysuria and urgency.   Skin: Negative for itching and rash.   Neurological: Negative for dizziness and headaches.        Past Medical History:   Diagnosis Date    Asthma     GERD (gastroesophageal reflux disease)     Hypertension     Ovarian cyst     Rotator cuff tear, right        Past Surgical History:   Procedure Laterality Date    BILATERAL TUBAL LIGATION      BREAST BIOPSY Left     benign     SECTION      x 3    COLONOSCOPY N/A 2022    Procedure: COLONOSCOPY;  Surgeon: Cirilo Tate MD;  Location: [address]);  Service: Endoscopy;  Laterality: N/A;  Constipation bowel prep    ESOPHAGOGASTRODUODENOSCOPY N/A 2022    Procedure: EGD (ESOPHAGOGASTRODUODENOSCOPY);  Surgeon: Cirilo Tate MD;  Location: [address]);  Service: Endoscopy;  Laterality: N/A;  COVID test at [facility] on -GT    ESOPHAGOGASTRODUODENOSCOPY  N/A 7/12/2022    Procedure: EGD (ESOPHAGOGASTRODUODENOSCOPY);  Surgeon: Cirilo Tate MD;  Location: [facility] (4TH FLR);  Service: Endoscopy;  Laterality: N/A;    LAPAROSCOPIC CHOLECYSTECTOMY N/A 02/07/2022    Procedure: CHOLECYSTECTOMY, LAPAROSCOPIC;  Surgeon: Chau Thomason Jr., MD;  Location: [facility] OR 2ND FLR;  Service: General;  Laterality: N/A;    LAPAROSCOPY LYSIS OF ADHESIONS  11/28/2018       Current Outpatient Medications on File Prior to Visit   Medication Sig Dispense Refill    albuterol (PROVENTIL) 5 mg/mL nebulizer solution Inhale 2 puffs into the lungs.      cholecalciferol, vitamin D3, (VITAMIN D3) 50 mcg (2,000 unit) Cap capsule Take 2 capsules (4,000 Units total) by mouth once daily. 180 capsule 3    fluticasone propionate (FLONASE) 50 mcg/actuation nasal spray 2 sprays (100 mcg total) by Each Nostril route once daily. 18.2 mL 8    pantoprazole (PROTONIX) 40 MG tablet TAKE 1 TABLET BY MOUTH EVERY 12 HOURS 180 tablet 0    spironolactone (ALDACTONE) 25 MG tablet Take 1 tablet (25 mg total) by mouth once daily. 90 tablet 3    valACYclovir (VALTREX) 500 MG tablet TAKE 1 TABLET BY MOUTH TWICE DAILY FOR 3 DAYS AS NEEDED FOR  FLARE 60 tablet 0    [DISCONTINUED] tiZANidine (ZANAFLEX) 4 MG tablet 1 po QHS prn back pain 30 tablet 0     No current facility-administered medications on file prior to visit.       Social History     Socioeconomic History    Marital status:    Tobacco Use    Smoking status: Never Smoker    Smokeless tobacco: Never Used   Substance and Sexual Activity    Alcohol use: Yes     Comment: Just Friday two [place] Ice teas    Drug use: Never    Sexual activity: Yes     Partners: Male     Birth control/protection: See Surgical Hx     Comment: Bilateral to ligation   Social History Narrative    She is     She works as a Nurse       Review of patient's allergies indicates:  No Known Allergies      PHYSICAL EXAM:  There were no vitals filed for this  visit.    Physical Exam  Constitutional:       Appearance: Normal appearance.   HENT:      Head: Normocephalic and atraumatic.   Eyes:      Pupils: Pupils are equal, round, and reactive to light.   Cardiovascular:      Rate and Rhythm: Normal rate.   Pulmonary:      Effort: Pulmonary effort is normal. No respiratory distress.   Abdominal:      General: Abdomen is flat.      Palpations: Abdomen is soft.      Tenderness: There is abdominal tenderness.      Hernia: A hernia is present.      Comments: Incisions well healed. Tenderness at umbilicus. Supraumbilical hernia, partially reducible. No skin changes. Tender.    Musculoskeletal:         General: Normal range of motion.      Cervical back: Normal range of motion.   Skin:     General: Skin is warm and dry.   Neurological:      General: No focal deficit present.      Mental Status: She is alert and oriented to person, place, and time.   Psychiatric:         Mood and Affect: Mood normal.         Behavior: Behavior normal.             PERTINENT LABS:  Reviewed.       PERTINENT IMAGING:  Reviewed.       ASSESSMENT/PLAN:  Ilene Garber is a 46 y.o. female with hx of lap delilah earlier this year who presents today with a chalo-umbilical hernia at incision site from cholecystectomy earlier this year. It is causing her discomfort and she would like to have it repaired.     - To OR for robotic incisional hernia repair  - Consent obtained in clinic today      Margot Thayer MD  [facility] PGY-III  Pager: [phone]      I have personally taken the history and examined this patient and agree with the resident's note as stated above.         Chau Thomason MD

## 2022-08-12 ENCOUNTER — TELEPHONE (OUTPATIENT)
Dept: SURGERY | Facility: CLINIC | Age: 47
End: 2022-08-12
Payer: COMMERCIAL

## 2022-09-21 ENCOUNTER — TELEPHONE (OUTPATIENT)
Dept: SURGERY | Facility: CLINIC | Age: 47
End: 2022-09-21
Payer: COMMERCIAL

## 2022-09-21 ENCOUNTER — ANESTHESIA EVENT (OUTPATIENT)
Dept: SURGERY | Facility: HOSPITAL | Age: 47
End: 2022-09-21
Payer: COMMERCIAL

## 2022-09-21 NOTE — ANESTHESIA PREPROCEDURE EVALUATION
[facility]  Anesthesia Pre-Operative Evaluation         Patient Name: Ilene Garber  YOB: 1975  MRN: [id number]    SUBJECTIVE:     Pre-operative evaluation for Procedure(s) (LRB):  XI ROBOTIC REPAIR, HERNIA, INCISIONAL w/ mesh (N/A)     09/21/2022    Ilene Garber is a 47 y.o. female w/ HTN, mild asthma, and GERD who presents for the above procedure.    Prev airway:   Placement Date: 02/07/22; Placement Time: 1013 (created via procedure documentation); Method of Intubation: Direct laryngoscopy; Mask Ventilation: Easy; Intubated: Postinduction; Blade: Ding #2; Airway Device Size: 7.0; Cuff Inflation: Minimal occlusive pressure; Placement Verified By: Capnometry; Complicating Factors: Anterior larynx; Intubation Findings: Bilateral breath sounds, Atraumatic/Condition of teeth unchanged; Securment: Lips; Complications: None    Patient Active Problem List   Diagnosis    Hypertension    GERD (gastroesophageal reflux disease)    Mild intermittent asthma without complication    Class 1 obesity due to excess calories with serious comorbidity and body mass index (BMI) of 30.0 to 30.9 in adult    Musculoskeletal chest pain       Review of patient's allergies indicates:  No Known Allergies    Current Inpatient Medications:      No current facility-administered medications on file prior to encounter.     Current Outpatient Medications on File Prior to Encounter   Medication Sig Dispense Refill    albuterol (PROVENTIL) 5 mg/mL nebulizer solution Inhale 2 puffs into the lungs.      cholecalciferol, vitamin D3, (VITAMIN D3) 50 mcg (2,000 unit) Cap capsule Take 2 capsules (4,000 Units total) by mouth once daily. 180 capsule 3    fluticasone propionate (FLONASE) 50 mcg/actuation nasal spray 2 sprays (100 mcg total) by Each Nostril route once daily. 18.2 mL 8    pantoprazole (PROTONIX) 40 MG tablet TAKE 1 TABLET BY MOUTH EVERY 12 HOURS 180 tablet 0    spironolactone (ALDACTONE) 25  MG tablet Take 1 tablet (25 mg total) by mouth once daily. 90 tablet 3    tiZANidine (ZANAFLEX) 4 MG tablet TAKE 1 TABLET BY MOUTH EVERY DAY AT BEDTIME AS NEEDED FOR BACK PAIN 30 tablet 0    valACYclovir (VALTREX) 500 MG tablet TAKE 1 TABLET BY MOUTH TWICE DAILY FOR 3 DAYS AS NEEDED FOR  FLARE 60 tablet 0       Past Surgical History:   Procedure Laterality Date    BILATERAL TUBAL LIGATION      BREAST BIOPSY Left 2020    benign     SECTION      x 3    COLONOSCOPY N/A 2022    Procedure: COLONOSCOPY;  Surgeon: Cirilo Tate MD;  [facility]  Service: Endoscopy;  Laterality: N/A;  Constipation bowel prep    ESOPHAGOGASTRODUODENOSCOPY N/A 2022    Procedure: EGD (ESOPHAGOGASTRODUODENOSCOPY);  Surgeon: Cirilo Tate MD;  Location: [facility] (4TH FLR);  Service: Endoscopy;  Laterality: N/A;  COVID test at [place] on -GT    ESOPHAGOGASTRODUODENOSCOPY N/A 2022    Procedure: EGD (ESOPHAGOGASTRODUODENOSCOPY);  Surgeon: Cirilo Tate MD;  Location: [facility] (4TH FLR);  Service: Endoscopy;  Laterality: N/A;    LAPAROSCOPIC CHOLECYSTECTOMY N/A 2022    Procedure: CHOLECYSTECTOMY, LAPAROSCOPIC;  Surgeon: Chau Thomason Jr., MD;  [facility];  Service: General;  Laterality: N/A;    LAPAROSCOPY LYSIS OF ADHESIONS  2018       Social History     Socioeconomic History    Marital status:    Tobacco Use    Smoking status: Never    Smokeless tobacco: Never   Substance and Sexual Activity    Alcohol use: Yes     Comment: Just Friday two [place] [employer] teas    Drug use: Never    Sexual activity: Yes     Partners: Male     Birth control/protection: See Surgical Hx     Comment: Bilateral to ligation   Social History Narrative    She is     She works as a Nurse       OBJECTIVE:     Vital Signs Range (Last 24H):         CBC:   No results for input(s): WBC, RBC, HGB, HCT, PLT, MCV, MCH, MCHC in the last 72 hours.    CMP: No  results for input(s): NA, K, CL, CO2, BUN, CREATININE, GLU, MG, PHOS, CALCIUM, ALBUMIN, PROT, ALKPHOS, ALT, AST, BILITOT in the last 72 hours.    INR:  No results for input(s): PT, INR, PROTIME, APTT in the last 72 hours.    Diagnostic Studies: No relevant studies.    EKG:   Results for orders placed or performed during the hospital encounter of 12/29/21   SCHEDULED EKG 12-LEAD (to Muse)    Collection Time: 12/29/21 10:48 AM    Narrative    Test Reason : K80.20,    Vent. Rate : 071 BPM     Atrial Rate : 071 BPM     P-R Int : 162 ms          QRS Dur : 076 ms      QT Int : 380 ms       P-R-T Axes : 056 037 038 degrees     QTc Int : 412 ms    Normal sinus rhythm  Normal ECG  No previous ECGs available  Confirmed by Mt Sagastume MD ([id number]) on 12/29/2021 2:43:35 PM    Referred By: HENRIQUE MENDOZA           Confirmed By:Mt Sagastume MD        2D ECHO:   No results found for this or any previous visit.         ASSESSMENT/PLAN:         Pre-op Assessment    I have reviewed the Patient Summary Reports.     I have reviewed the Nursing Notes. I have reviewed the NPO Status.   I have reviewed the Medications.     Review of Systems  Anesthesia Hx:  No problems with previous Anesthesia Denies Hx of Anesthetic complications  History of prior surgery of interest to airway management or planning: Previous anesthesia: General, MAC Denies Family Hx of Anesthesia complications.   Denies Personal Hx of Anesthesia complications.   Social:  Non-Smoker, No Alcohol Use    Hematology/Oncology:         -- Denies Anemia: Denies Current/Recent Cancer   Cardiovascular:   Hypertension Denies CAD.    Denies Dysrhythmias.   Denies CHF. no hyperlipidemia    Pulmonary:   Denies COPD. Asthma  Denies Sleep Apnea.    Renal/:   Denies Chronic Renal Disease.     Hepatic/GI:   GERD Denies Liver Disease.    Musculoskeletal:   Denies Arthritis.     Neurological:   Denies CVA. Denies Neuromuscular Disease.  Denies Seizures.   Denies Chronic Pain  Syndrome   Endocrine:   Denies Diabetes. Denies Hyperthyroidism.  Obesity / BMI > 30  Psych:   denies anxiety denies depression          Physical Exam  General: Well nourished    Airway:  Mallampati: II   Mouth Opening: Normal  TM Distance: Normal  Tongue: Normal  Neck ROM: Normal ROM    Dental:  Intact        Anesthesia Plan  Type of Anesthesia, risks & benefits discussed:    Anesthesia Type: Gen ETT  Intra-op Monitoring Plan: Standard ASA Monitors  Post Op Pain Control Plan: multimodal analgesia and IV/PO Opioids PRN  Induction:  IV  Airway Plan: Direct and Video, Post-Induction  Informed Consent: Informed consent signed with the Patient and all parties understand the risks and agree with anesthesia plan.  All questions answered.   ASA Score: 2  Day of Surgery Review of History & Physical: H&P Update referred to the surgeon/provider.    Ready For Surgery From Anesthesia Perspective.     .

## 2022-09-22 ENCOUNTER — ANESTHESIA (OUTPATIENT)
Dept: SURGERY | Facility: HOSPITAL | Age: 47
End: 2022-09-22
Payer: COMMERCIAL

## 2022-09-22 ENCOUNTER — HOSPITAL ENCOUNTER (OUTPATIENT)
Facility: HOSPITAL | Age: 47
Discharge: HOME | End: 2022-09-22
Attending: SURGERY | Admitting: SURGERY
Payer: COMMERCIAL

## 2022-09-22 VITALS
TEMPERATURE: 97 F | RESPIRATION RATE: 20 BRPM | HEART RATE: 68 BPM | HEIGHT: 66 IN | OXYGEN SATURATION: 97 % | BODY MASS INDEX: 31.66 KG/M2 | SYSTOLIC BLOOD PRESSURE: 160 MMHG | DIASTOLIC BLOOD PRESSURE: 78 MMHG | WEIGHT: 197 LBS

## 2022-09-22 DIAGNOSIS — K43.2 INCISIONAL HERNIA, WITHOUT OBSTRUCTION OR GANGRENE: Primary | ICD-10-CM

## 2022-09-22 LAB
B-HCG UR QL: NEGATIVE
CTP QC/QA: YES

## 2022-09-22 PROCEDURE — 36000710: Performed by: SURGERY

## 2022-09-22 PROCEDURE — 25000003 PHARM REV CODE 250

## 2022-09-22 PROCEDURE — 63600175 PHARM REV CODE 636 W HCPCS

## 2022-09-22 PROCEDURE — 25000003 PHARM REV CODE 250: Performed by: SURGERY

## 2022-09-22 PROCEDURE — 81025 URINE PREGNANCY TEST: CPT | Performed by: SURGERY

## 2022-09-22 PROCEDURE — 71000016 HC POSTOP RECOV ADDL HR: Performed by: SURGERY

## 2022-09-22 PROCEDURE — D9220A PRA ANESTHESIA: Mod: ,,, | Performed by: ANESTHESIOLOGY

## 2022-09-22 PROCEDURE — 36000711: Performed by: SURGERY

## 2022-09-22 PROCEDURE — 27201423 OPTIME MED/SURG SUP & DEVICES STERILE SUPPLY: Performed by: SURGERY

## 2022-09-22 PROCEDURE — 37000009 HC ANESTHESIA EA ADD 15 MINS: Performed by: SURGERY

## 2022-09-22 PROCEDURE — 71000045 HC DOSC ROUTINE RECOVERY EA ADD'L HR: Performed by: SURGERY

## 2022-09-22 PROCEDURE — 71000015 HC POSTOP RECOV 1ST HR: Performed by: SURGERY

## 2022-09-22 PROCEDURE — 49656 PR LAP, RECURRENT INCISIONAL HERNIA REPAIR,REDUCIBLE: ICD-10-PCS | Mod: ,,, | Performed by: SURGERY

## 2022-09-22 PROCEDURE — 37000008 HC ANESTHESIA 1ST 15 MINUTES: Performed by: SURGERY

## 2022-09-22 PROCEDURE — C1781 MESH (IMPLANTABLE): HCPCS | Performed by: SURGERY

## 2022-09-22 PROCEDURE — D9220A PRA ANESTHESIA: ICD-10-PCS | Mod: ,,, | Performed by: ANESTHESIOLOGY

## 2022-09-22 PROCEDURE — 71000044 HC DOSC ROUTINE RECOVERY FIRST HOUR: Performed by: SURGERY

## 2022-09-22 PROCEDURE — 49656 PR LAP, RECURRENT INCISIONAL HERNIA REPAIR,REDUCIBLE: CPT | Mod: ,,, | Performed by: SURGERY

## 2022-09-22 DEVICE — MESH SYMBOTEX HERN RND 12CM: Type: IMPLANTABLE DEVICE | Site: ABDOMEN | Status: FUNCTIONAL

## 2022-09-22 RX ORDER — ONDANSETRON 2 MG/ML
INJECTION INTRAMUSCULAR; INTRAVENOUS
Status: DISCONTINUED | OUTPATIENT
Start: 2022-09-22 | End: 2022-09-22

## 2022-09-22 RX ORDER — BUPIVACAINE HYDROCHLORIDE AND EPINEPHRINE 2.5; 5 MG/ML; UG/ML
INJECTION, SOLUTION EPIDURAL; INFILTRATION; INTRACAUDAL; PERINEURAL
Status: DISCONTINUED | OUTPATIENT
Start: 2022-09-22 | End: 2022-09-22 | Stop reason: HOSPADM

## 2022-09-22 RX ORDER — CEFAZOLIN SODIUM/WATER 2 G/20 ML
2 SYRINGE (ML) INTRAVENOUS
Status: COMPLETED | OUTPATIENT
Start: 2022-09-22 | End: 2022-09-22

## 2022-09-22 RX ORDER — SODIUM CHLORIDE 0.9 % (FLUSH) 0.9 %
10 SYRINGE (ML) INJECTION
Status: DISCONTINUED | OUTPATIENT
Start: 2022-09-22 | End: 2022-09-22 | Stop reason: HOSPADM

## 2022-09-22 RX ORDER — OXYCODONE HYDROCHLORIDE 5 MG/1
5 TABLET ORAL EVERY 4 HOURS PRN
Status: DISCONTINUED | OUTPATIENT
Start: 2022-09-22 | End: 2022-09-22 | Stop reason: HOSPADM

## 2022-09-22 RX ORDER — OXYCODONE HYDROCHLORIDE 5 MG/1
5 TABLET ORAL EVERY 4 HOURS PRN
Qty: 20 TABLET | Refills: 0 | Status: SHIPPED | OUTPATIENT
Start: 2022-09-22 | End: 2023-07-10

## 2022-09-22 RX ORDER — HYDROMORPHONE HYDROCHLORIDE 1 MG/ML
INJECTION, SOLUTION INTRAMUSCULAR; INTRAVENOUS; SUBCUTANEOUS
Status: DISCONTINUED | OUTPATIENT
Start: 2022-09-22 | End: 2022-09-22

## 2022-09-22 RX ORDER — ACETAMINOPHEN 500 MG
1000 TABLET ORAL
Status: COMPLETED | OUTPATIENT
Start: 2022-09-22 | End: 2022-09-22

## 2022-09-22 RX ORDER — MIDAZOLAM HYDROCHLORIDE 1 MG/ML
INJECTION, SOLUTION INTRAMUSCULAR; INTRAVENOUS
Status: DISCONTINUED | OUTPATIENT
Start: 2022-09-22 | End: 2022-09-22

## 2022-09-22 RX ORDER — HALOPERIDOL 5 MG/ML
0.5 INJECTION INTRAMUSCULAR EVERY 10 MIN PRN
Status: DISCONTINUED | OUTPATIENT
Start: 2022-09-22 | End: 2022-09-22 | Stop reason: HOSPADM

## 2022-09-22 RX ORDER — FENTANYL CITRATE 50 UG/ML
INJECTION, SOLUTION INTRAMUSCULAR; INTRAVENOUS
Status: DISCONTINUED | OUTPATIENT
Start: 2022-09-22 | End: 2022-09-22

## 2022-09-22 RX ORDER — LIDOCAINE HYDROCHLORIDE 10 MG/ML
INJECTION INFILTRATION; PERINEURAL
Status: DISCONTINUED | OUTPATIENT
Start: 2022-09-22 | End: 2022-09-22 | Stop reason: HOSPADM

## 2022-09-22 RX ORDER — PHENYLEPHRINE HCL IN 0.9% NACL 1 MG/10 ML
SYRINGE (ML) INTRAVENOUS
Status: DISCONTINUED | OUTPATIENT
Start: 2022-09-22 | End: 2022-09-22

## 2022-09-22 RX ORDER — DEXMEDETOMIDINE HYDROCHLORIDE 100 UG/ML
INJECTION, SOLUTION INTRAVENOUS
Status: DISCONTINUED | OUTPATIENT
Start: 2022-09-22 | End: 2022-09-22

## 2022-09-22 RX ORDER — KETAMINE HCL IN 0.9 % NACL 50 MG/5 ML
SYRINGE (ML) INTRAVENOUS
Status: DISCONTINUED | OUTPATIENT
Start: 2022-09-22 | End: 2022-09-22

## 2022-09-22 RX ORDER — ROCURONIUM BROMIDE 10 MG/ML
INJECTION, SOLUTION INTRAVENOUS
Status: DISCONTINUED | OUTPATIENT
Start: 2022-09-22 | End: 2022-09-22

## 2022-09-22 RX ORDER — DEXAMETHASONE SODIUM PHOSPHATE 4 MG/ML
INJECTION, SOLUTION INTRA-ARTICULAR; INTRALESIONAL; INTRAMUSCULAR; INTRAVENOUS; SOFT TISSUE
Status: DISCONTINUED | OUTPATIENT
Start: 2022-09-22 | End: 2022-09-22

## 2022-09-22 RX ORDER — PROPOFOL 10 MG/ML
VIAL (ML) INTRAVENOUS
Status: DISCONTINUED | OUTPATIENT
Start: 2022-09-22 | End: 2022-09-22

## 2022-09-22 RX ORDER — HYDROMORPHONE HYDROCHLORIDE 1 MG/ML
0.2 INJECTION, SOLUTION INTRAMUSCULAR; INTRAVENOUS; SUBCUTANEOUS EVERY 5 MIN PRN
Status: DISCONTINUED | OUTPATIENT
Start: 2022-09-22 | End: 2022-09-22 | Stop reason: HOSPADM

## 2022-09-22 RX ORDER — LIDOCAINE HYDROCHLORIDE 20 MG/ML
INJECTION, SOLUTION EPIDURAL; INFILTRATION; INTRACAUDAL; PERINEURAL
Status: DISCONTINUED | OUTPATIENT
Start: 2022-09-22 | End: 2022-09-22

## 2022-09-22 RX ORDER — SUCCINYLCHOLINE CHLORIDE 20 MG/ML
INJECTION INTRAMUSCULAR; INTRAVENOUS
Status: DISCONTINUED | OUTPATIENT
Start: 2022-09-22 | End: 2022-09-22

## 2022-09-22 RX ADMIN — DEXAMETHASONE SODIUM PHOSPHATE 8 MG: 4 INJECTION INTRA-ARTICULAR; INTRALESIONAL; INTRAMUSCULAR; INTRAVENOUS; SOFT TISSUE at 09:09

## 2022-09-22 RX ADMIN — ROCURONIUM BROMIDE 45 MG: 10 INJECTION INTRAVENOUS at 09:09

## 2022-09-22 RX ADMIN — Medication 10 MG: at 10:09

## 2022-09-22 RX ADMIN — HYDROMORPHONE HYDROCHLORIDE 0.2 MG: 1 INJECTION, SOLUTION INTRAMUSCULAR; INTRAVENOUS; SUBCUTANEOUS at 10:09

## 2022-09-22 RX ADMIN — GLYCOPYRROLATE 0.2 MG: 0.2 INJECTION INTRAMUSCULAR; INTRAVENOUS at 10:09

## 2022-09-22 RX ADMIN — SUCCINYLCHOLINE CHLORIDE 180 MG: 20 INJECTION, SOLUTION INTRAMUSCULAR; INTRAVENOUS at 09:09

## 2022-09-22 RX ADMIN — ROCURONIUM BROMIDE 20 MG: 10 INJECTION INTRAVENOUS at 10:09

## 2022-09-22 RX ADMIN — LIDOCAINE HYDROCHLORIDE 100 MG: 20 INJECTION, SOLUTION EPIDURAL; INFILTRATION; INTRACAUDAL; PERINEURAL at 09:09

## 2022-09-22 RX ADMIN — SUGAMMADEX 200 MG: 100 INJECTION, SOLUTION INTRAVENOUS at 10:09

## 2022-09-22 RX ADMIN — ACETAMINOPHEN 1000 MG: 500 TABLET ORAL at 07:09

## 2022-09-22 RX ADMIN — Medication 100 MCG: at 09:09

## 2022-09-22 RX ADMIN — PROPOFOL 160 MG: 10 INJECTION, EMULSION INTRAVENOUS at 09:09

## 2022-09-22 RX ADMIN — MIDAZOLAM HYDROCHLORIDE 2 MG: 1 INJECTION, SOLUTION INTRAMUSCULAR; INTRAVENOUS at 09:09

## 2022-09-22 RX ADMIN — ROCURONIUM BROMIDE 20 MG: 10 INJECTION INTRAVENOUS at 09:09

## 2022-09-22 RX ADMIN — ONDANSETRON 4 MG: 2 INJECTION INTRAMUSCULAR; INTRAVENOUS at 10:09

## 2022-09-22 RX ADMIN — SODIUM CHLORIDE, SODIUM GLUCONATE, SODIUM ACETATE, POTASSIUM CHLORIDE, MAGNESIUM CHLORIDE, SODIUM PHOSPHATE, DIBASIC, AND POTASSIUM PHOSPHATE: .53; .5; .37; .037; .03; .012; .00082 INJECTION, SOLUTION INTRAVENOUS at 09:09

## 2022-09-22 RX ADMIN — HYDROMORPHONE HYDROCHLORIDE 0.2 MG: 1 INJECTION, SOLUTION INTRAMUSCULAR; INTRAVENOUS; SUBCUTANEOUS at 01:09

## 2022-09-22 RX ADMIN — Medication 100 MCG: at 10:09

## 2022-09-22 RX ADMIN — ROCURONIUM BROMIDE 5 MG: 10 INJECTION INTRAVENOUS at 09:09

## 2022-09-22 RX ADMIN — DEXMEDETOMIDINE HYDROCHLORIDE 4 MCG: 100 INJECTION, SOLUTION INTRAVENOUS at 10:09

## 2022-09-22 RX ADMIN — FENTANYL CITRATE 100 MCG: 0.05 INJECTION, SOLUTION INTRAMUSCULAR; INTRAVENOUS at 09:09

## 2022-09-22 RX ADMIN — SODIUM CHLORIDE: 0.9 INJECTION, SOLUTION INTRAVENOUS at 09:09

## 2022-09-22 RX ADMIN — Medication 2 G: at 09:09

## 2022-09-22 RX ADMIN — HYDROMORPHONE HYDROCHLORIDE 0.2 MG: 1 INJECTION, SOLUTION INTRAMUSCULAR; INTRAVENOUS; SUBCUTANEOUS at 11:09

## 2022-09-22 RX ADMIN — DEXMEDETOMIDINE HYDROCHLORIDE 8 MCG: 100 INJECTION, SOLUTION INTRAVENOUS at 10:09

## 2022-09-22 RX ADMIN — Medication 20 MG: at 09:09

## 2022-09-22 NOTE — ANESTHESIA PROCEDURE NOTES
Intubation    Date/Time: 9/22/2022 9:28 AM  Performed by: Alec Mcneil MD  Authorized by: Ted Neumann Jr., MD     Intubation:     Induction:  Intravenous    Intubated:  Postinduction    Mask Ventilation:  Easy mask    Attempts:  1    Attempted By:  Student    Method of Intubation:  Video laryngoscopy    Blade:  Cortés 3    Laryngeal View Grade: Grade I - full view of cords      Difficult Airway Encountered?: No      Complications:  None    Airway Device:  Oral endotracheal tube    Airway Device Size:  7.0    Tube secured:  22    Secured at:  The teeth    Placement Verified By:  Capnometry    Complicating Factors:  None    Findings Post-Intubation:  BS equal bilateral and atraumatic/condition of teeth unchanged

## 2022-09-22 NOTE — ANESTHESIA POSTPROCEDURE EVALUATION
Anesthesia Post Evaluation    Patient: Ilene Garber    Procedure(s) Performed: Procedure(s) (LRB):  XI ROBOTIC REPAIR, HERNIA, INCISIONAL w/ mesh (N/A)    Final Anesthesia Type: general      Patient location during evaluation: PACU  Patient participation: Yes- Able to Participate  Level of consciousness: awake and alert  Post-procedure vital signs: reviewed and stable  Pain management: adequate  Airway patency: patent    PONV status at discharge: No PONV  Anesthetic complications: no      Cardiovascular status: blood pressure returned to baseline  Respiratory status: spontaneous ventilation and room air  Hydration status: euvolemic  Follow-up not needed.          Vitals Value Taken Time   /78 09/22/22 1132   Temp 36.2 °C (97.2 °F) 09/22/22 1110   Pulse 69 09/22/22 1139   Resp 26 09/22/22 1139   SpO2 100 % 09/22/22 1139   Vitals shown include unvalidated device data.      No case tracking events are documented in the log.      Pain/Onofre Score: Pain Rating Prior to Med Admin: 0 (9/22/2022  7:46 AM)  Onofre Score: 8 (9/22/2022 11:10 AM)

## 2022-09-22 NOTE — PLAN OF CARE
Discharge instructions given to patient and spouse. Copies provided. Both verbalized understanding. VSS. No c/o nausea. Pain level tolerable. No s/s of distress noted. Surgical sites C/D/I. RX for pain medication filled and brought to bedside. Patient meets criteria for discharge. Patient taken to vehicle via wc.

## 2022-09-22 NOTE — H&P
Interval History and Physical    Patient seen in pre-op. No new complaints, symptoms, or changes to history since last seen. Written and signed consent on file. Questions answered.    Karin Lucas MD  General Surgery, PGY-1  [facility]      History and Physical    CC:  Incisional hernia     HPI:  Ilene Garber is a 46 y.o. female with Hx of lap delilah earlier this year who presents to clinic for a chalo-umbilical incisional hernia. Patient reported to her PCP with umbilical pain and an US abdomen was obtained demonstrating a fat-containing chalo-umbilical hernia.   She first noticed a bulge about 2 months ago. It will intermittently bulge out and become painful to the touch. She has had an episode of severe pain at the site where she had to take her pain meds leftover from her cholecystectomy. She has never tried to push in the bulge. She has to lie on her back at night when it is painful and bulging.       ROS:   Review of Systems   Constitutional: Negative for chills, fever and malaise/fatigue.   Eyes: Negative for blurred vision and double vision.   Respiratory: Negative for cough and shortness of breath.    Cardiovascular: Negative for chest pain and palpitations.   Gastrointestinal: Negative for abdominal pain, diarrhea, nausea and vomiting.   Genitourinary: Negative for dysuria and urgency.   Skin: Negative for itching and rash.   Neurological: Negative for dizziness and headaches.              Past Medical History:   Diagnosis Date    Asthma      GERD (gastroesophageal reflux disease)      Hypertension      Ovarian cyst      Rotator cuff tear, right                 Past Surgical History:   Procedure Laterality Date    BILATERAL TUBAL LIGATION        BREAST BIOPSY Left      benign     SECTION         x 3    COLONOSCOPY N/A 2022     Procedure: COLONOSCOPY;  Surgeon: Cirilo Tate MD;  Location: [facility] ([address] [place] [address]);  Service: Endoscopy;  Laterality: N/A;  Constipation bowel  prep    ESOPHAGOGASTRODUODENOSCOPY N/A 01/19/2022     Procedure: EGD (ESOPHAGOGASTRODUODENOSCOPY);  Surgeon: Cirilo Tate MD;  Location: [facility] ENDO (4TH FLR);  Service: Endoscopy;  Laterality: N/A;  COVID test at [place] on 1/16-GT    ESOPHAGOGASTRODUODENOSCOPY N/A 7/12/2022     Procedure: EGD (ESOPHAGOGASTRODUODENOSCOPY);  Surgeon: Cirilo Tate MD;  Location: [facility] ENDO (4TH FLR);  Service: Endoscopy;  Laterality: N/A;    LAPAROSCOPIC CHOLECYSTECTOMY N/A 02/07/2022     Procedure: CHOLECYSTECTOMY, LAPAROSCOPIC;  Surgeon: Chau Thomason Jr., MD;  Location: [facility] OR 2ND FLR;  Service: General;  Laterality: N/A;    LAPAROSCOPY LYSIS OF ADHESIONS   11/28/2018                Current Outpatient Medications on File Prior to Visit   Medication Sig Dispense Refill    albuterol (PROVENTIL) 5 mg/mL nebulizer solution Inhale 2 puffs into the lungs.        cholecalciferol, vitamin D3, (VITAMIN D3) 50 mcg (2,000 unit) Cap capsule Take 2 capsules (4,000 Units total) by mouth once daily. 180 capsule 3    fluticasone propionate (FLONASE) 50 mcg/actuation nasal spray 2 sprays (100 mcg total) by Each Nostril route once daily. 18.2 mL 8    pantoprazole (PROTONIX) 40 MG tablet TAKE 1 TABLET BY MOUTH EVERY 12 HOURS 180 tablet 0    spironolactone (ALDACTONE) 25 MG tablet Take 1 tablet (25 mg total) by mouth once daily. 90 tablet 3    valACYclovir (VALTREX) 500 MG tablet TAKE 1 TABLET BY MOUTH TWICE DAILY FOR 3 DAYS AS NEEDED FOR  FLARE 60 tablet 0    [DISCONTINUED] tiZANidine (ZANAFLEX) 4 MG tablet 1 po QHS prn back pain 30 tablet 0      No current facility-administered medications on file prior to visit.         Social History               Socioeconomic History    Marital status:    Tobacco Use    Smoking status: Never Smoker    Smokeless tobacco: Never Used   Substance and Sexual Activity    Alcohol use: Yes       Comment: Just Friday two [place] Ice teas    Drug use: Never    Sexual activity: Yes       Partners:  Male       Birth control/protection: See Surgical Hx       Comment: Bilateral to ligation   Social History Narrative     She is      She works as a Nurse            Review of patient's allergies indicates:  No Known Allergies        PHYSICAL EXAM:  There were no vitals filed for this visit.     Physical Exam  Constitutional:       Appearance: Normal appearance.   HENT:      Head: Normocephalic and atraumatic.   Eyes:      Pupils: Pupils are equal, round, and reactive to light.   Cardiovascular:      Rate and Rhythm: Normal rate.   Pulmonary:      Effort: Pulmonary effort is normal. No respiratory distress.   Abdominal:      General: Abdomen is flat.      Palpations: Abdomen is soft.      Tenderness: There is abdominal tenderness.      Hernia: A hernia is present.      Comments: Incisions well healed. Tenderness at umbilicus. Supraumbilical hernia, partially reducible. No skin changes. Tender.    Musculoskeletal:         General: Normal range of motion.      Cervical back: Normal range of motion.   Skin:     General: Skin is warm and dry.   Neurological:      General: No focal deficit present.      Mental Status: She is alert and oriented to person, place, and time.   Psychiatric:         Mood and Affect: Mood normal.         Behavior: Behavior normal.                  PERTINENT LABS:  Reviewed.         PERTINENT IMAGING:  Reviewed.         ASSESSMENT/PLAN:  Ilene Garber is a 46 y.o. female with hx of lap delilah earlier this year who presents today with a chalo-umbilical hernia at incision site from cholecystectomy earlier this year. It is causing her discomfort and she would like to have it repaired.      - To OR for robotic incisional hernia repair  - Consent obtained in clinic today

## 2022-09-22 NOTE — BRIEF OP NOTE
[facility] - Surgery ([facility])  Brief Operative Note    Surgery Date: 9/22/2022     Surgeon(s) and Role:     * Chau Thomason Jr., MD - Primary     * Wilton Uriostegui MD - Resident - Chief      Pre-op Diagnosis:  Reducible bulge of abdominal wall [R19.00]    Post-op Diagnosis:  Post-Op Diagnosis Codes:     * Reducible bulge of abdominal wall [R19.00]    Procedure(s) (LRB):  XI ROBOTIC REPAIR, HERNIA, INCISIONAL w/ mesh (N/A)    Anesthesia: General    Operative Findings:   See op note    Estimated Blood Loss: 10 ml         Specimens:   Specimen (24h ago, onward)      None              Discharge Note    OUTCOME: Patient tolerated treatment/procedure well without complication and is now ready for discharge.    DISPOSITION: Home or Self Care    FINAL DIAGNOSIS:  Incisional hernia, without obstruction or gangrene    FOLLOWUP: In clinic    DISCHARGE INSTRUCTIONS:    Discharge Procedure Orders   Diet Adult Regular     Lifting restrictions   Order Comments: No lifting greater than 10 pounds for 6 weeks from day of surgery.  No pushing/pulling such as vacuuming or raking.  No straining, avoid constipation and take stool softeners as described and laxatives as needed.  No driving while on narcotics and until you can react quickly without pain.     No dressing needed   Order Comments: All of your incisions have been closed with suture that will dissolve on its own.Each of your wounds have small strips of material covering them.  This helps your incision heal.  Please do not pull these off, in a couple weeks they will start to come off on their own     Notify your health care provider if you experience any of the following:  temperature >100.4     Notify your health care provider if you experience any of the following:  severe uncontrolled pain     Notify your health care provider if you experience any of the following:  redness, tenderness, or signs of infection (pain, swelling, redness, odor or green/yellow discharge  around incision site)

## 2022-09-22 NOTE — TRANSFER OF CARE
"Anesthesia Transfer of Care Note    Patient: Ilene Garber    Procedure(s) Performed: Procedure(s) (LRB):  XI ROBOTIC REPAIR, HERNIA, INCISIONAL w/ mesh (N/A)    Patient location: [facility]    Anesthesia Type: general    Transport from OR: Transported from OR on 6-10 L/min O2 by face mask with adequate spontaneous ventilation    Post pain: adequate analgesia    Post assessment: no apparent anesthetic complications    Post vital signs: stable    Level of consciousness: awake and alert    Nausea/Vomiting: no nausea/vomiting    Complications: none    Transfer of care protocol was followed      Last vitals:   Visit Vitals  BP (!) 175/88 (BP Location: Left arm, Patient Position: Lying)   Pulse 75   Temp 36.6 °C (97.8 °F) (Oral)   Resp 18   Ht 5' 6" (1.676 m)   Wt 89.4 kg (197 lb)   LMP 09/14/2022   SpO2 100%   Breastfeeding No   BMI 31.80 kg/m²     "

## 2022-09-22 NOTE — OP NOTE
DATE OF PROCEDURE: 09/22/2022  SERVICE: General Surgery.   Surgeon(s) and Role:     * Chau Thomason Jr., MD - Primary     * Wilton Uriostegui MD - Resident - Chief  PREOPERATIVE DIAGNOSIS: Recurrent Incisional Hernia  POSTOPERATIVE DIAGNOSIS: Same  PROCEDURE: Robotic repair of Recurrent Incisional Hernia with mesh.   ANESTHESIA: General endotracheal and local.   DESCRIPTION OF PROCEDURE: The patient was taken to the Operating Room, placed under general anesthesia and prepped and draped in sterile fashion. At this   time, an incision was made in the left upper quadrant  after infiltrating with local anesthetic. This was 8 mm in nature. Using   Optiview trocar under direct visualization, intra-abdominal access was obtained   without difficulty and pneumoperitoneum was obtained. Further ports were then   placed including a umbilical level lateral 8mm port, a third 8mm port was placed in the left lower quadrant.  All ports were placed under direct visualization after   infiltrating with local anesthetic.  Once the ports were placed, we noticed   there were minimal adhesions to the anterior abdominal wall. We docked the robot.  We took these   down sharply where appropriate and bluntly where able to. We noticed the   Recurrent incisional hernia present.  We then used electrocautery to take down the falciform ligament if needed and to reduce the hernia. We continued to take down adhesions to allow for 5 cm coverage of the hernia defect. We measured the hernia and it was approximately 2cm.  We then closed the defect with a 0 V loc permanent suture.   We then proceeded with placement of a 12cm diameter Symbotex mesh.  This was rolled and placed into the abdominal cavity.  The mesh was then sewn into place with a 2-0 V loc suture in running fashion.  We inspected the mesh and it was in very good condition, nice and taut against the anterior abdominal wall with   circumferential coverage of the hernia defect.  The air  was then evacuated and the ports were removed.  The   skin of all ports were closed with 4-0 Monocryl suture in subcuticular   fashion. Mastisol and Steri-Strips were placed.  At this time, the patient was allowed to awake from general anesthesia and transferred to bed for transfer to Recovery and an abdominal binder was placed.   COMPLICATIONS: None.   SPONGE COUNT: Correct.   BLOOD LOSS: 15 mL.   FLUIDS: Per Anesthesia.   BLOOD GIVEN: None.   DRAINS: None.   SPECIMENS: None.   CONDITION OF PATIENT: Good.   I was present for the entire procedure.

## 2022-09-26 ENCOUNTER — PATIENT MESSAGE (OUTPATIENT)
Dept: PRIMARY CARE CLINIC | Facility: CLINIC | Age: 47
End: 2022-09-26
Payer: COMMERCIAL

## 2022-09-26 DIAGNOSIS — I10 HYPERTENSION, UNSPECIFIED TYPE: Primary | ICD-10-CM

## 2022-09-26 RX ORDER — AMLODIPINE BESYLATE 5 MG/1
5 TABLET ORAL DAILY
Qty: 30 TABLET | Refills: 1 | Status: SHIPPED | OUTPATIENT
Start: 2022-09-26 | End: 2022-12-09 | Stop reason: SDUPTHER

## 2022-09-30 ENCOUNTER — LAB VISIT (OUTPATIENT)
Dept: LAB | Facility: HOSPITAL | Age: 47
End: 2022-09-30
Attending: INTERNAL MEDICINE
Payer: COMMERCIAL

## 2022-09-30 ENCOUNTER — OFFICE VISIT (OUTPATIENT)
Dept: PRIMARY CARE CLINIC | Facility: CLINIC | Age: 47
End: 2022-09-30
Payer: COMMERCIAL

## 2022-09-30 VITALS
RESPIRATION RATE: 18 BRPM | BODY MASS INDEX: 31.07 KG/M2 | HEIGHT: 66 IN | WEIGHT: 193.31 LBS | DIASTOLIC BLOOD PRESSURE: 94 MMHG | HEART RATE: 76 BPM | OXYGEN SATURATION: 98 % | TEMPERATURE: 98 F | SYSTOLIC BLOOD PRESSURE: 146 MMHG

## 2022-09-30 DIAGNOSIS — E87.6 HYPOKALEMIA: ICD-10-CM

## 2022-09-30 DIAGNOSIS — I10 HYPERTENSION, UNSPECIFIED TYPE: Primary | ICD-10-CM

## 2022-09-30 DIAGNOSIS — E55.9 VITAMIN D DEFICIENCY: ICD-10-CM

## 2022-09-30 DIAGNOSIS — J45.20 MILD INTERMITTENT ASTHMA WITHOUT COMPLICATION: ICD-10-CM

## 2022-09-30 DIAGNOSIS — I10 HYPERTENSION, UNSPECIFIED TYPE: ICD-10-CM

## 2022-09-30 DIAGNOSIS — E61.1 IRON DEFICIENCY: ICD-10-CM

## 2022-09-30 DIAGNOSIS — E66.09 CLASS 1 OBESITY DUE TO EXCESS CALORIES WITH SERIOUS COMORBIDITY AND BODY MASS INDEX (BMI) OF 31.0 TO 31.9 IN ADULT: ICD-10-CM

## 2022-09-30 LAB
ANION GAP SERPL CALC-SCNC: 10 MMOL/L (ref 8–16)
BUN SERPL-MCNC: 9 MG/DL (ref 6–20)
CALCIUM SERPL-MCNC: 9.6 MG/DL (ref 8.7–10.5)
CHLORIDE SERPL-SCNC: 103 MMOL/L (ref 95–110)
CO2 SERPL-SCNC: 24 MMOL/L (ref 23–29)
CREAT SERPL-MCNC: 0.8 MG/DL (ref 0.5–1.4)
EST. GFR  (NO RACE VARIABLE): >60 ML/MIN/1.73 M^2
GLUCOSE SERPL-MCNC: 86 MG/DL (ref 70–110)
MAGNESIUM SERPL-MCNC: 2.1 MG/DL (ref 1.6–2.6)
POTASSIUM SERPL-SCNC: 3.7 MMOL/L (ref 3.5–5.1)
SODIUM SERPL-SCNC: 137 MMOL/L (ref 136–145)

## 2022-09-30 PROCEDURE — 3008F PR BODY MASS INDEX (BMI) DOCUMENTED: ICD-10-PCS | Mod: CPTII,S$GLB,, | Performed by: INTERNAL MEDICINE

## 2022-09-30 PROCEDURE — 99999 PR PBB SHADOW E&M-EST. PATIENT-LVL V: ICD-10-PCS | Mod: PBBFAC,,, | Performed by: INTERNAL MEDICINE

## 2022-09-30 PROCEDURE — 3080F PR MOST RECENT DIASTOLIC BLOOD PRESSURE >= 90 MM HG: ICD-10-PCS | Mod: CPTII,S$GLB,, | Performed by: INTERNAL MEDICINE

## 2022-09-30 PROCEDURE — 83735 ASSAY OF MAGNESIUM: CPT | Performed by: INTERNAL MEDICINE

## 2022-09-30 PROCEDURE — 1159F MED LIST DOCD IN RCRD: CPT | Mod: CPTII,S$GLB,, | Performed by: INTERNAL MEDICINE

## 2022-09-30 PROCEDURE — 3080F DIAST BP >= 90 MM HG: CPT | Mod: CPTII,S$GLB,, | Performed by: INTERNAL MEDICINE

## 2022-09-30 PROCEDURE — 1160F RVW MEDS BY RX/DR IN RCRD: CPT | Mod: CPTII,S$GLB,, | Performed by: INTERNAL MEDICINE

## 2022-09-30 PROCEDURE — 36415 COLL VENOUS BLD VENIPUNCTURE: CPT | Mod: PN | Performed by: INTERNAL MEDICINE

## 2022-09-30 PROCEDURE — 99214 OFFICE O/P EST MOD 30 MIN: CPT | Mod: S$GLB,,, | Performed by: INTERNAL MEDICINE

## 2022-09-30 PROCEDURE — 99999 PR PBB SHADOW E&M-EST. PATIENT-LVL V: CPT | Mod: PBBFAC,,, | Performed by: INTERNAL MEDICINE

## 2022-09-30 PROCEDURE — 80048 BASIC METABOLIC PNL TOTAL CA: CPT | Performed by: INTERNAL MEDICINE

## 2022-09-30 PROCEDURE — 99214 PR OFFICE/OUTPT VISIT, EST, LEVL IV, 30-39 MIN: ICD-10-PCS | Mod: S$GLB,,, | Performed by: INTERNAL MEDICINE

## 2022-09-30 PROCEDURE — 3077F PR MOST RECENT SYSTOLIC BLOOD PRESSURE >= 140 MM HG: ICD-10-PCS | Mod: CPTII,S$GLB,, | Performed by: INTERNAL MEDICINE

## 2022-09-30 PROCEDURE — 3077F SYST BP >= 140 MM HG: CPT | Mod: CPTII,S$GLB,, | Performed by: INTERNAL MEDICINE

## 2022-09-30 PROCEDURE — 3008F BODY MASS INDEX DOCD: CPT | Mod: CPTII,S$GLB,, | Performed by: INTERNAL MEDICINE

## 2022-09-30 PROCEDURE — 1159F PR MEDICATION LIST DOCUMENTED IN MEDICAL RECORD: ICD-10-PCS | Mod: CPTII,S$GLB,, | Performed by: INTERNAL MEDICINE

## 2022-09-30 PROCEDURE — 1160F PR REVIEW ALL MEDS BY PRESCRIBER/CLIN PHARMACIST DOCUMENTED: ICD-10-PCS | Mod: CPTII,S$GLB,, | Performed by: INTERNAL MEDICINE

## 2022-09-30 NOTE — PROGRESS NOTES
[facility] Note    Chief Complaint      Chief Complaint   Patient presents with    Hypertension       History of Present Illness      Ilene Garber is a 47 y.o.  AAF with HTN, Hypokalemia, GERD, Mild Intermittent Asthma, Dysmnenorrhea, RT Rotator cuff tear presents to fu well visit. Last virtual visit - 11/17/21.    H/o gastritis - EGD 1/19/22- H. pylori gastritis Mild active proctitis. She was tx with Quad Tx. Repeat EGD 7/12/22 - 1 cm hiatal hernia.  Pt on Protonix. She is being fu by GI, Dr. Tate.      H/o gallstones - CT abd 11/30/21 - Nitrogen containing cholelithiasis with small volume of nonspecific pericholecystic fluid.  No evidence of wall thickening or pericholecystic fat stranding.  If there is clinical concern for cholecystitis, recommend HIDA scan. Few scattered prominent mesenteric lymph nodes without evidence of pathologic lymphadenopathy.GI referred to general surgery for evaluation of postprandial abdominal pain in gallstone seen on CT scan.  s/p lap delilah 2/7/22 with Dr. Thomason.    Iman umbilical hernia s/p repair - 9/22/22     Iron def - Pt is iron deficient but not anemic and Dr. Tate had recommend that she take ferrous gluconate one 324mg pill once daily. Resolved.      Vit D def -  Pt completed Ergo per Dr. Tate 50,000 units ONCE A WEEK (every 7-days) for 12 wks and then when complete she will start Vitamin D3 4,000 units/d OTC.     Low HDL - Her Cholesterol looks ok but her HDL (good Cholesterol) is low.  I recommend exercise to increase this.  Repeat [facility] nect visit.      Hypokalemia - 3.2.  Recheck potassium and magnesium.      HSV genital - Pt on Valtrex prn.      HTN - We recently switched her from HCTZ to Spironolactone 25 mg/d.  She is doing well. Her potassium went up from 2.9 to 3.4.  -130/70-71. No edema.   Cont high potassium diet. She is now on amlodipine 5 mg/d we Added at this 9/26/22.  BP high today but pt is in pain from recent Sx and  "did not take her amlodipine yet today.  She will monitor Bp and call me with log in 2 wks.      GERD- Pt on Protonix daily.      Dysmenorrhea - + heavy cycles.  Improved s/p D&C. She takes OTC iron x 2 days around her cycle.  + Iron def. Pelvic U/S - 11/5/21 - uterine fibroid, cyst, and nabothian cysts.      RT shoulder pain - She was injured at work. She tore her rotator cuff.  "It comes and goes but has been ok lately". Avoid NSAIDS. She takes Tylenol prn, heatin gpad prn, voltaren gel prn.      Mild intermittent Asthma - Allergies/weather change are a trigger.  Only needs Proair 5 times/yr.   Allergies - trigger.      Obesity - BMI - 31.21 - Down 14 lb since July 2020 Rec exercise every other day. Rec low carb diet.     HCM - Flu - 10/18/21 - at work;  Tdap - ?;  PCV 13 - ?;  PVX 23 - ?;  Shingrix - due at 52 y.o.;  COVID -19 Vaccine - (Moderna) # 1 - 2/10/21 and #2 - 3/11/21;  MGM - 3/2/22 -repeat 1 yrs;  PAP -11/1/21-neg.;  hep C screen - 11/1/21 - neg.HIV Screen -11/1/21 -neg. ; C-scope - none - due at 47 y.o.; Prev PCP - Dr. Rory Cooper; Ob/GYN - Dr. Dey;  GI - Dr. Tate; well visit - 11/17/21    Patient Care Team:  Uma Henry MD as PCP - General (Internal Medicine)  Primary Doctor Alisha Dey Jr., MD as Obstetrician (Obstetrics)  Ayala Sharma MA as Care Coordinator     Health Maintenance:  Immunization History   Administered Date(s) Administered    COVID-19, MRNA, LN-S, PF (MODERNA FULL 0.5 ML DOSE) 02/10/2021, 03/11/2021    Influenza - Quadrivalent - [facility] - PF 11/08/2021      Health Maintenance   Topic Date Due    TETANUS VACCINE  Never done    Mammogram  03/02/2023    Lipid Panel  01/03/2027    Hepatitis C Screening  Completed        Past Medical History:  Past Medical History:   Diagnosis Date    Asthma     Gallstones     GERD (gastroesophageal reflux disease)     H. pylori infection     Hypertension     Ovarian cyst     Rotator cuff tear, right        Past Surgical " History:   has a past surgical history that includes  section; LAPAROSCOPY LYSIS OF ADHESIONS (2018); Bilateral tubal ligation; Esophagogastroduodenoscopy (N/A, 2022); Colonoscopy (N/A, 2022); Laparoscopic cholecystectomy (N/A, 2022); Breast biopsy (Left, ); Esophagogastroduodenoscopy (N/A, 2022); and Robot-assisted repair of incisional hernia using da Vamshi Xi (N/A, 2022).    Family History:  family history includes COPD in her mother; Coronary artery disease in her mother; Diabetes in her maternal grandmother; Hypertension in her maternal grandmother.     Social History:  Social History     Tobacco Use    Smoking status: Never    Smokeless tobacco: Never   Substance Use Topics    Alcohol use: Yes     Comment: Just Friday two [place] [employer] teas    Drug use: Never       Review of Systems   Constitutional:  Negative for chills, diaphoresis and fever.   HENT:  Positive for nasal congestion and postnasal drip. Negative for hearing loss and rhinorrhea.    Eyes:  Positive for visual disturbance.        Wears glasses. She fu with ophtho.   Respiratory:  Negative for cough.    Gastrointestinal:  Positive for abdominal pain. Negative for constipation, diarrhea, nausea and vomiting.        From surgical site   Genitourinary:  Negative for bladder incontinence, difficulty urinating and frequency.   Neurological:  Positive for headaches. Negative for dizziness.   Psychiatric/Behavioral:  Negative for dysphoric mood. The patient is not nervous/anxious.         Gets a little sad when thinking about her Mom who passed.      Medications:    Current Outpatient Medications:     albuterol (PROVENTIL) 5 mg/mL nebulizer solution, Inhale 2 puffs into the lungs., Disp: , Rfl:     amLODIPine (NORVASC) 5 MG tablet, Take 1 tablet (5 mg total) by mouth once daily., Disp: 30 tablet, Rfl: 1    cholecalciferol, vitamin D3, (VITAMIN D3) 50 mcg (2,000 unit) Cap capsule, Take 2 capsules (4,000 Units  "total) by mouth once daily., Disp: 180 capsule, Rfl: 3    fluticasone propionate (FLONASE) 50 mcg/actuation nasal spray, 2 sprays (100 mcg total) by Each Nostril route once daily., Disp: 18.2 mL, Rfl: 8    oxyCODONE (ROXICODONE) 5 MG immediate release tablet, Take 1 tablet (5 mg total) by mouth every 4 (four) hours as needed for Pain., Disp: 20 tablet, Rfl: 0    pantoprazole (PROTONIX) 40 MG tablet, TAKE 1 TABLET BY MOUTH EVERY 12 HOURS, Disp: 180 tablet, Rfl: 0    spironolactone (ALDACTONE) 25 MG tablet, Take 1 tablet (25 mg total) by mouth once daily., Disp: 90 tablet, Rfl: 3    tiZANidine (ZANAFLEX) 4 MG tablet, TAKE 1 TABLET BY MOUTH EVERY DAY AT BEDTIME AS NEEDED FOR BACK PAIN, Disp: 30 tablet, Rfl: 0    valACYclovir (VALTREX) 500 MG tablet, TAKE 1 TABLET BY MOUTH TWICE DAILY FOR 3 DAYS AS NEEDED FOR  FLARE, Disp: 60 tablet, Rfl: 0     Allergies:  Review of patient's allergies indicates:  No Known Allergies    Physical Exam      Vital Signs  Temp: 97.6 °F (36.4 °C)  Temp src: Oral  Pulse: 76  Resp: 18  SpO2: 98 %  BP: (!) (P) 138/100  BP Location: (P) Left arm  Patient Position: (P) Sitting  Pain Score:   8  Pain Loc: Abdomen (abd and back;pt had surgery last week)  Height and Weight  Height: 5' 6" (167.6 cm)  Weight: 87.7 kg (193 lb 5.5 oz)  BSA (Calculated - sq m): 2.02 sq meters  BMI (Calculated): 31.2  Weight in (lb) to have BMI = 25: 154.6      Patient Position: (P) Sitting      Physical Exam  Vitals reviewed.   Constitutional:       General: She is not in acute distress.     Appearance: Normal appearance. She is not ill-appearing, toxic-appearing or diaphoretic.   HENT:      Head: Normocephalic and atraumatic.      Ears:      Comments: Left serous otitis  Eyes:      Conjunctiva/sclera: Conjunctivae normal.      Pupils: Pupils are equal, round, and reactive to light.   Neck:      Vascular: No carotid bruit.   Cardiovascular:      Rate and Rhythm: Normal rate and regular rhythm.      Pulses: Normal pulses. "      Heart sounds: Normal heart sounds. No murmur heard.  Pulmonary:      Effort: No respiratory distress.      Breath sounds: Normal breath sounds. No wheezing.   Abdominal:      General: Bowel sounds are normal. There is no distension.      Palpations: Abdomen is soft.      Tenderness: There is abdominal tenderness. There is no guarding or rebound.      Comments: Laparoscopy scars to Left abdomen - no actuive drainage - steri strips in plae. No erythema.  + TTP in rUQ mainlt.  No rebound. +voluntary guarding.    Musculoskeletal:         General: Normal range of motion.   Skin:     General: Skin is warm.   Neurological:      General: No focal deficit present.      Mental Status: She is alert and oriented to person, place, and time.   Psychiatric:         Mood and Affect: Mood normal.         Behavior: Behavior normal.        Laboratory:  CBC:  Recent Labs   Lab 11/05/21  1415 12/29/21  1036 01/03/22  0900 08/10/22  1216   WBC 5.96 4.20  --  6.33   RBC 4.35 4.78  --  4.42   Hemoglobin 12.4 13.4   < > 12.4   Hematocrit 39.5 43.1  --  38.5   Platelets 299 336  --  311   MCV 91 90  --  87   MCH 28.5 28.0  --  28.1   MCHC 31.4 L 31.1 L  --  32.2    < > = values in this interval not displayed.       CMP:  Recent Labs   Lab 11/03/20  0710 12/29/21  1036 01/03/22  0900 08/10/22  1216   Glucose 106 83 92 74   Calcium 9.5 9.6 9.8 9.4   Albumin 4.3 4.3 4.0  --    Total Protein 8.3 8.4 7.7  --    Sodium 139 137 136 137   Potassium 3.4 L 4.1 4.1 3.2 L   CO2 30 H 27 20 L 27   Chloride 100 105 106 103   BUN 10 9 8 9   Creatinine 1.1 1.0 1.0 0.9   Alkaline Phosphatase 61 66 57  --    ALT 9 L 14 9 L  --    AST 16 19 14  --    Total Bilirubin 0.7 0.6 0.8  --        LIPIDS:  Recent Labs   Lab 07/24/20  0724 01/03/22  0900   TSH 1.243 1.510   HDL 39 L 35 L   Cholesterol 137 151   Triglycerides 82 93   LDL Cholesterol 81.6 97.4   HDL/Cholesterol Ratio 28.5 23.2   Non-HDL Cholesterol 98 116   Total Cholesterol/HDL Ratio 3.5 4.3        TSH:  Recent Labs   Lab 07/24/20  0724 01/03/22  0900   TSH 1.243 1.510     Other:   Recent Labs   Lab 11/05/21  1415 01/03/22  0900 05/20/22  1445   Vit D, 25-Hydroxy 10 L  --  24 L   Vitamin B-12 253  --   --    Ferritin 51 72  --    Iron 69 125  --    Transferrin 278 263  --    TIBC 411 389  --    Saturated Iron 17 L 32  --      Recent Labs   Lab 11/01/21  1355   Hepatitis C Ab Negative       Assessment/Plan     Ilene Garber is a 47 y.o.female with:    Hypertension, unspecified type  -     Basic Metabolic Panel; Future; Expected date: 09/30/2022  -     Magnesium; Future; Expected date: 09/30/2022  - Elevated today. She is now on amlodipine 5 mg/d we added at this 9/26/22.  BP high today but pt is in pain from recent Sx and did not take her amlodipine yet today.  She will monitor Bp and call me with log in 2 wks.     Hypokalemia  -     Basic Metabolic Panel; Future; Expected date: 09/30/2022  -     Magnesium; Future; Expected date: 09/30/2022  - Repeat BMP, Mg.     Iron deficiency  - Resolved.     Vitamin D deficiency  - Stable.  Cont current regimen.    Mild intermittent asthma without complication  - Stable.  Cont current regimen.    Class 1 obesity due to excess calories with serious comorbidity and body mass index (BMI) of 31.0 to 31.9 in adult  - Congratulated on wt loss. Cont diet and exercise.     Chronic conditions status updated as per HPI.  Other than changes above, cont current medications and maintain follow up with specialists.  Follow up in about 3 months (around 1/5/2023).      Uma Henry MD  [facility]

## 2022-10-03 NOTE — PROGRESS NOTES
I sent pt a my chart message -  I reviewed your labs. Your kidney funciton looked good. Your Potassium was back to normal.  Your magnesium was normal.    Dr. CONTRERAS

## 2022-10-12 ENCOUNTER — OFFICE VISIT (OUTPATIENT)
Dept: SURGERY | Facility: CLINIC | Age: 47
End: 2022-10-12
Payer: COMMERCIAL

## 2022-10-12 VITALS
DIASTOLIC BLOOD PRESSURE: 82 MMHG | WEIGHT: 190 LBS | HEART RATE: 76 BPM | HEIGHT: 66 IN | BODY MASS INDEX: 30.53 KG/M2 | SYSTOLIC BLOOD PRESSURE: 151 MMHG

## 2022-10-12 DIAGNOSIS — Z09 POSTOP CHECK: Primary | ICD-10-CM

## 2022-10-12 PROCEDURE — 3008F BODY MASS INDEX DOCD: CPT | Mod: CPTII,S$GLB,, | Performed by: SURGERY

## 2022-10-12 PROCEDURE — 1159F MED LIST DOCD IN RCRD: CPT | Mod: CPTII,S$GLB,, | Performed by: SURGERY

## 2022-10-12 PROCEDURE — 3077F SYST BP >= 140 MM HG: CPT | Mod: CPTII,S$GLB,, | Performed by: SURGERY

## 2022-10-12 PROCEDURE — 1159F PR MEDICATION LIST DOCUMENTED IN MEDICAL RECORD: ICD-10-PCS | Mod: CPTII,S$GLB,, | Performed by: SURGERY

## 2022-10-12 PROCEDURE — 99024 PR POST-OP FOLLOW-UP VISIT: ICD-10-PCS | Mod: S$GLB,,, | Performed by: SURGERY

## 2022-10-12 PROCEDURE — 99024 POSTOP FOLLOW-UP VISIT: CPT | Mod: S$GLB,,, | Performed by: SURGERY

## 2022-10-12 PROCEDURE — 3079F DIAST BP 80-89 MM HG: CPT | Mod: CPTII,S$GLB,, | Performed by: SURGERY

## 2022-10-12 PROCEDURE — 99999 PR PBB SHADOW E&M-EST. PATIENT-LVL III: ICD-10-PCS | Mod: PBBFAC,,, | Performed by: SURGERY

## 2022-10-12 PROCEDURE — 3008F PR BODY MASS INDEX (BMI) DOCUMENTED: ICD-10-PCS | Mod: CPTII,S$GLB,, | Performed by: SURGERY

## 2022-10-12 PROCEDURE — 3079F PR MOST RECENT DIASTOLIC BLOOD PRESSURE 80-89 MM HG: ICD-10-PCS | Mod: CPTII,S$GLB,, | Performed by: SURGERY

## 2022-10-12 PROCEDURE — 3077F PR MOST RECENT SYSTOLIC BLOOD PRESSURE >= 140 MM HG: ICD-10-PCS | Mod: CPTII,S$GLB,, | Performed by: SURGERY

## 2022-10-12 PROCEDURE — 1160F RVW MEDS BY RX/DR IN RCRD: CPT | Mod: CPTII,S$GLB,, | Performed by: SURGERY

## 2022-10-12 PROCEDURE — 1160F PR REVIEW ALL MEDS BY PRESCRIBER/CLIN PHARMACIST DOCUMENTED: ICD-10-PCS | Mod: CPTII,S$GLB,, | Performed by: SURGERY

## 2022-10-12 PROCEDURE — 99999 PR PBB SHADOW E&M-EST. PATIENT-LVL III: CPT | Mod: PBBFAC,,, | Performed by: SURGERY

## 2022-10-12 NOTE — PROGRESS NOTES
[facility]  Post Op Visit    SUBJECTIVE:  Ilene Garber is a 47 y.o. female who presents to clinic today for post op follow up after repair of incisional hernia on 9/22/2022. She  denies, fevers, chills, nausea, vomiting, diarrhea, and constipation and is returning to their usual activity level. She is tolerating diet with normal appetite and bowel function and denies redness around or drainage from incisions. She endorses some abdominal pain on exertion and ambulation, but manages pain with tylenol.     OBJECTIVE:  There were no vitals filed for this visit.  There is no height or weight on file to calculate BMI.    General: female in NAD. Not toxic appearing.   HENT: Normocephalic and atraumatic. EOM intact.   Pulmonary: No respiratory distress. Effort normal.  Cardiovascular: Regular rate.   Abdomen: soft, non-tender, non-distended  Skin: Incisions are healing well without signs of infection. No erythema, drainage, or increased warmth noted.   MSK: normal range of motion  Neurological: No focal deficit present; alert and oriented to person, place, and time.  Psychiatric: normal mood and behavior        ASSESSMENT/PLAN:    Ilene Garber is a 47 y.o. y/o female who presents to clinic today for f/u s/p repair of incisional hernia on 9/22/2022. Clinically improving and meeting all post op milestones     - Patient is advised to avoid heavy lifting or strenuous activity for another 4 weeks.  - Path reviewed  - Follow-up PRN. Call clinic with any questions or concerns  - All questions answered; patient is comfortable with the above follow-up plan and verbalized understanding.

## 2022-11-04 ENCOUNTER — PATIENT OUTREACH (OUTPATIENT)
Dept: ADMINISTRATIVE | Facility: HOSPITAL | Age: 47
End: 2022-11-04
Payer: COMMERCIAL

## 2022-11-04 ENCOUNTER — PATIENT MESSAGE (OUTPATIENT)
Dept: ADMINISTRATIVE | Facility: HOSPITAL | Age: 47
End: 2022-11-04
Payer: COMMERCIAL

## 2022-11-10 ENCOUNTER — TELEPHONE (OUTPATIENT)
Dept: ADMINISTRATIVE | Facility: HOSPITAL | Age: 47
End: 2022-11-10
Payer: COMMERCIAL

## 2022-11-10 VITALS — SYSTOLIC BLOOD PRESSURE: 132 MMHG | DIASTOLIC BLOOD PRESSURE: 76 MMHG

## 2022-11-14 ENCOUNTER — LAB VISIT (OUTPATIENT)
Dept: LAB | Facility: HOSPITAL | Age: 47
End: 2022-11-14
Attending: INTERNAL MEDICINE
Payer: COMMERCIAL

## 2022-11-14 DIAGNOSIS — E55.9 VITAMIN D INSUFFICIENCY: ICD-10-CM

## 2022-11-14 LAB — 25(OH)D3+25(OH)D2 SERPL-MCNC: 31 NG/ML (ref 30–96)

## 2022-11-14 PROCEDURE — 36415 COLL VENOUS BLD VENIPUNCTURE: CPT | Mod: PN | Performed by: INTERNAL MEDICINE

## 2022-11-14 PROCEDURE — 82306 VITAMIN D 25 HYDROXY: CPT | Performed by: INTERNAL MEDICINE

## 2022-11-14 NOTE — PROGRESS NOTES
Ilene keep up the great work with your vitamin-D it is now in the normal range but yet still need to stay on supplementation the best his over-the-counter vitamin D3 probably 4000 IU once daily

## 2022-12-09 ENCOUNTER — OFFICE VISIT (OUTPATIENT)
Dept: DERMATOLOGY | Facility: CLINIC | Age: 47
End: 2022-12-09
Payer: COMMERCIAL

## 2022-12-09 DIAGNOSIS — I10 HYPERTENSION, UNSPECIFIED TYPE: ICD-10-CM

## 2022-12-09 DIAGNOSIS — L70.0 CYSTIC ACNE: Primary | ICD-10-CM

## 2022-12-09 PROCEDURE — 1160F RVW MEDS BY RX/DR IN RCRD: CPT | Mod: CPTII,95,, | Performed by: STUDENT IN AN ORGANIZED HEALTH CARE EDUCATION/TRAINING PROGRAM

## 2022-12-09 PROCEDURE — 99204 PR OFFICE/OUTPT VISIT, NEW, LEVL IV, 45-59 MIN: ICD-10-PCS | Mod: 95,,, | Performed by: STUDENT IN AN ORGANIZED HEALTH CARE EDUCATION/TRAINING PROGRAM

## 2022-12-09 PROCEDURE — 1159F PR MEDICATION LIST DOCUMENTED IN MEDICAL RECORD: ICD-10-PCS | Mod: CPTII,95,, | Performed by: STUDENT IN AN ORGANIZED HEALTH CARE EDUCATION/TRAINING PROGRAM

## 2022-12-09 PROCEDURE — 1160F PR REVIEW ALL MEDS BY PRESCRIBER/CLIN PHARMACIST DOCUMENTED: ICD-10-PCS | Mod: CPTII,95,, | Performed by: STUDENT IN AN ORGANIZED HEALTH CARE EDUCATION/TRAINING PROGRAM

## 2022-12-09 PROCEDURE — 1159F MED LIST DOCD IN RCRD: CPT | Mod: CPTII,95,, | Performed by: STUDENT IN AN ORGANIZED HEALTH CARE EDUCATION/TRAINING PROGRAM

## 2022-12-09 PROCEDURE — 99204 OFFICE O/P NEW MOD 45 MIN: CPT | Mod: 95,,, | Performed by: STUDENT IN AN ORGANIZED HEALTH CARE EDUCATION/TRAINING PROGRAM

## 2022-12-09 RX ORDER — TRETINOIN 0.5 MG/G
CREAM TOPICAL NIGHTLY
Qty: 45 G | Refills: 5 | Status: SHIPPED | OUTPATIENT
Start: 2022-12-09 | End: 2023-02-04 | Stop reason: SDUPTHER

## 2022-12-09 RX ORDER — SPIRONOLACTONE 100 MG/1
100 TABLET, FILM COATED ORAL DAILY
Qty: 30 TABLET | Refills: 5 | Status: SHIPPED | OUTPATIENT
Start: 2022-12-09 | End: 2024-01-10

## 2022-12-09 RX ORDER — AMLODIPINE BESYLATE 5 MG/1
5 TABLET ORAL DAILY
Qty: 30 TABLET | Refills: 1 | Status: CANCELLED | OUTPATIENT
Start: 2022-12-09 | End: 2023-12-09

## 2022-12-09 NOTE — PROGRESS NOTES
Patient Information  Name: Ilene Garber  : 1975  MRN: [id number]     Referring Physician:  Dr. Brito ref. provider found   Primary Care Physician:  Dr. Uma Henry MD   Date of Visit: 2022      Subjective:       Ilene Garber is a 47 y.o. female who presents for acne    HPI  The patient location is: [place], [place]  The chief complaint leading to consultation is: acne    Visit type: audiovisual    Face to Face time with patient: 8 min  10 minutes of total time spent on the encounter, which includes face to face time and non-face to face time preparing to see the patient (eg, review of tests), Obtaining and/or reviewing separately obtained history, Documenting clinical information in the electronic or other health record, Independently interpreting results (not separately reported) and communicating results to the patient/family/caregiver, or Care coordination (not separately reported).     Each patient to whom he or she provides medical services by telemedicine is:  (1) informed of the relationship between the physician and patient and the respective role of any other health care provider with respect to management of the patient; and (2) notified that he or she may decline to receive medical services by telemedicine and may withdraw from such care at any time.    Notes:   Patient with new complaint of lesion(s)  Location: face  Duration: years, since teenage years  Symptoms: painful cysts and nodules under skin  Relieving factors/Previous treatments: Differin gel    Patient was last seen:Visit date not found     Prior notes by myself reviewed.   Clinical documentation obtained by nursing staff reviewed.    Review of Systems   Skin:  Negative for itching and rash.      Objective:    Physical Exam   Constitutional: She appears well-developed and well-nourished. No distress.   Neurological: She is alert and oriented to person, place, and time. She is not disoriented.   Psychiatric: She has a  normal mood and affect.   Skin:   Areas Examined (abnormalities noted in diagram):   Head / Face Inspection Performed  Neck Inspection Performed            Diagram Legend     Erythematous scaling macule/papule c/w actinic keratosis       Vascular papule c/w angioma      Pigmented verrucoid papule/plaque c/w seborrheic keratosis      Yellow umbilicated papule c/w sebaceous hyperplasia      Irregularly shaped tan macule c/w lentigo     1-2 mm smooth white papules consistent with Milia      Movable subcutaneous cyst with punctum c/w epidermal inclusion cyst      Subcutaneous movable cyst c/w pilar cyst      Firm pink to brown papule c/w dermatofibroma      Pedunculated fleshy papule(s) c/w skin tag(s)      Evenly pigmented macule c/w junctional nevus     Mildly variegated pigmented, slightly irregular-bordered macule c/w mildly atypical nevus      Flesh colored to evenly pigmented papule c/w intradermal nevus       Pink pearly papule/plaque c/w basal cell carcinoma      Erythematous hyperkeratotic cursted plaque c/w SCC      Surgical scar with no sign of skin cancer recurrence      Open and closed comedones      Inflammatory papules and pustules      Verrucoid papule consistent consistent with wart     Erythematous eczematous patches and plaques     Dystrophic onycholytic nail with subungual debris c/w onychomycosis     Umbilicated papule    Erythematous-base heme-crusted tan verrucoid plaque consistent with inflamed seborrheic keratosis     Erythematous Silvery Scaling Plaque c/w Psoriasis     See annotation          [] Data reviewed  [] Independent review of test  [] Management discussed with another provider    Assessment / Plan:        Cystic acne  -     spironolactone (ALDACTONE) 100 MG tablet; Take 1 tablet (100 mg total) by mouth once daily.  Dispense: 30 tablet; Refill: 5  - Recommend take 50 mg daily for 2 weeks while checking blood pressure. If blood pressure in good range, increase to 100 mg daily  -      tretinoin (RETIN-A) 0.05 % cream; Apply topically every evening.  Dispense: 45 g; Refill: 5  Discussed benefits and risks of therapy including but not limited to breakthrough bleeding/menstrual irregularities, breast tenderness/enlargement, and elevated potassium levels which may give symptoms of fatigue, palpitations, dizziness, headaches and nausea. Patient should limit potassium intake - avoid potassium supplements or salt substitutes, limit bananas and citrus fruits. Pregnancy must be avoided while taking spironolactone.             LOS NUMBER AND COMPLEXITY OF PROBLEMS    COMPLEXITY OF DATA RISK TOTAL TIME (m)   18280  26207 [] 1 self-limited or minor problem [x] Minimal to none [] No treatment recommended or patient to monitor 15-29  10-19   [id number]  [id number] Low  [] 2 or > self limited or minor problems  [] 1 stable chronic illness  [] 1 acute, uncomplicated illness or injury Limited (2)  [] Prior external notes from each unique source  [] Review result of each unique test  [] Order each unique test []  Low  OTC medications, minor skin biopsy 30-44  20-29   65743  [id number] Moderate  [x]  1 or > chronic illness with progression, exacerbation or SE of treatment  []  2 or more stable chronic illnesses  []  1 acute illness with systemic symptoms  []  1 acute complicated injury  []  1 undiagnosed new problem with uncertain prognosis Moderate (1/3 below)  []  3 or more data items        *Now includes assessment requiring independent historian  []  Independent interpretation of a test  []  Discuss management/test with another provider Moderate  [x]  Prescription drug mgmt  []  Minor surgery with risk discussed  []  Mgmt limited by social determinates 45-59  30-39   88823  87008 High  []  1 or more chronic illness with severe exacerbation, progression or SE of treatment  []  1 acute or chronic illness/injury that poses a threat to life or bodily function Extensive (2/3 below)  []  3 or more data items        *Now includes  assessment requiring independent historian.  []  Independent interpretation of a test  []  Discuss management/test with another provider High  []  Major surgery with risk discussed  []  Drug therapy requiring intensive monitoring for toxicity  []  Hospitalization  []  Decision for DNR 60-74  40-54      No follow-ups on file.    Amy Corbett MD, FAAD  [facility]

## 2022-12-09 NOTE — TELEPHONE ENCOUNTER
No new care gaps identified.  [facility] Embedded Care Gaps. Reference number: [id number]. 12/09/2022   5:57:15 PM CST

## 2023-01-09 ENCOUNTER — OFFICE VISIT (OUTPATIENT)
Dept: PRIMARY CARE CLINIC | Facility: CLINIC | Age: 48
End: 2023-01-09
Payer: COMMERCIAL

## 2023-01-09 VITALS
TEMPERATURE: 99 F | RESPIRATION RATE: 18 BRPM | BODY MASS INDEX: 31.71 KG/M2 | DIASTOLIC BLOOD PRESSURE: 79 MMHG | HEART RATE: 81 BPM | SYSTOLIC BLOOD PRESSURE: 129 MMHG | OXYGEN SATURATION: 98 % | WEIGHT: 197.31 LBS | HEIGHT: 66 IN

## 2023-01-09 DIAGNOSIS — I10 HYPERTENSION, UNSPECIFIED TYPE: ICD-10-CM

## 2023-01-09 DIAGNOSIS — Z00.00 NORMAL PHYSICAL EXAM, ROUTINE: Primary | ICD-10-CM

## 2023-01-09 DIAGNOSIS — J45.20 MILD INTERMITTENT ASTHMA WITHOUT COMPLICATION: ICD-10-CM

## 2023-01-09 DIAGNOSIS — Z13.220 SCREENING FOR LIPOID DISORDERS: ICD-10-CM

## 2023-01-09 DIAGNOSIS — E61.1 IRON DEFICIENCY: ICD-10-CM

## 2023-01-09 DIAGNOSIS — E55.9 VITAMIN D DEFICIENCY: ICD-10-CM

## 2023-01-09 DIAGNOSIS — K21.9 GASTROESOPHAGEAL REFLUX DISEASE, UNSPECIFIED WHETHER ESOPHAGITIS PRESENT: ICD-10-CM

## 2023-01-09 DIAGNOSIS — E66.09 CLASS 1 OBESITY DUE TO EXCESS CALORIES WITH SERIOUS COMORBIDITY AND BODY MASS INDEX (BMI) OF 31.0 TO 31.9 IN ADULT: ICD-10-CM

## 2023-01-09 PROCEDURE — 1159F PR MEDICATION LIST DOCUMENTED IN MEDICAL RECORD: ICD-10-PCS | Mod: CPTII,S$GLB,, | Performed by: INTERNAL MEDICINE

## 2023-01-09 PROCEDURE — 99396 PR PREVENTIVE VISIT,EST,40-64: ICD-10-PCS | Mod: S$GLB,,, | Performed by: INTERNAL MEDICINE

## 2023-01-09 PROCEDURE — 99396 PREV VISIT EST AGE 40-64: CPT | Mod: S$GLB,,, | Performed by: INTERNAL MEDICINE

## 2023-01-09 PROCEDURE — 3078F PR MOST RECENT DIASTOLIC BLOOD PRESSURE < 80 MM HG: ICD-10-PCS | Mod: CPTII,S$GLB,, | Performed by: INTERNAL MEDICINE

## 2023-01-09 PROCEDURE — 3078F DIAST BP <80 MM HG: CPT | Mod: CPTII,S$GLB,, | Performed by: INTERNAL MEDICINE

## 2023-01-09 PROCEDURE — 1159F MED LIST DOCD IN RCRD: CPT | Mod: CPTII,S$GLB,, | Performed by: INTERNAL MEDICINE

## 2023-01-09 PROCEDURE — 3074F PR MOST RECENT SYSTOLIC BLOOD PRESSURE < 130 MM HG: ICD-10-PCS | Mod: CPTII,S$GLB,, | Performed by: INTERNAL MEDICINE

## 2023-01-09 PROCEDURE — 99999 PR PBB SHADOW E&M-EST. PATIENT-LVL IV: CPT | Mod: PBBFAC,,, | Performed by: INTERNAL MEDICINE

## 2023-01-09 PROCEDURE — 99999 PR PBB SHADOW E&M-EST. PATIENT-LVL IV: ICD-10-PCS | Mod: PBBFAC,,, | Performed by: INTERNAL MEDICINE

## 2023-01-09 PROCEDURE — 3074F SYST BP LT 130 MM HG: CPT | Mod: CPTII,S$GLB,, | Performed by: INTERNAL MEDICINE

## 2023-01-09 PROCEDURE — 3008F BODY MASS INDEX DOCD: CPT | Mod: CPTII,S$GLB,, | Performed by: INTERNAL MEDICINE

## 2023-01-09 PROCEDURE — 3008F PR BODY MASS INDEX (BMI) DOCUMENTED: ICD-10-PCS | Mod: CPTII,S$GLB,, | Performed by: INTERNAL MEDICINE

## 2023-01-09 RX ORDER — AMLODIPINE BESYLATE 5 MG/1
5 TABLET ORAL DAILY
Qty: 90 TABLET | Refills: 1 | Status: SHIPPED | OUTPATIENT
Start: 2023-01-09 | End: 2023-07-10 | Stop reason: SDUPTHER

## 2023-01-09 NOTE — PROGRESS NOTES
[facility] Primary [facility] Clinic Note    Chief Complaint      Chief Complaint   Patient presents with    Follow-up     Follow up blood pressure no medical issues today        History of Present Illness      Ilene Garber is a 47 y.o.  AAF with HTN, Hypokalemia, GERD, Mild Intermittent Asthma, Dysmnenorrhea, RT Rotator cuff tear presents to fu well visit. Last virtual visit - 9/30/22       H/o gastritis - EGD 1/19/22- H. pylori gastritis Mild active proctitis. She was tx with Quad Tx. Repeat EGD 7/12/22 - 1 cm hiatal hernia.  Pt on Protonix BID. She is being fu by GI, Dr. Tate.       H/o gallstones - CT abd 11/30/21 - Nitrogen containing cholelithiasis with small volume of nonspecific pericholecystic fluid.  No evidence of wall thickening or pericholecystic fat stranding.  If there is clinical concern for cholecystitis, recommend HIDA scan. Few scattered prominent mesenteric lymph nodes without evidence of pathologic lymphadenopathy.GI referred to general surgery for evaluation of postprandial abdominal pain in gallstone seen on CT scan.  Pt is s/p lap delilah 2/7/22 with Dr. Thomason.     Iman umbilical hernia s/p repair - 9/22/22      Iron def - Pt is iron deficient but not anemic and Dr. Tate had recommend that she take ferrous gluconate one 324mg pill once daily when on her cycle. Resolved.      Vit D def -  Resolved. Pt completed Ergo per Dr. Tate 50,000 units ONCE A WEEK (every 7-days) for 12 wks and is now on Vitamin D3 4,000 units/d OTC.     Low HDL - Her Cholesterol looks ok but her HDL (good Cholesterol) is low.  I recommend exercise to increase this.  Repeat FLP.      Hypokalemia  3.7 - resolved.  Cont to monitor K+ on aldactone.      HSV genital - Pt on Valtrex prn.      HTN - We recently switched her from HCTZ to Spironolactone 25 mg/d.  She is doing well. Her potassium improved. She is now on amlodipine 5 mg/d since 9/26/22.  Derm inc her aldactone from 25 mg to 50 and then 100 mg for tx of  "her acne. Doing well. No dizziness.   Cont to monitor K+.      GERD- Pt on Protonix BID. Fu by GI, Dr. Tate.      Dysmenorrhea - + heavy cycles.  Improved s/p D&C. She takes OTC iron x 2 days around her cycle.  + Iron def. Pelvic U/S - 11/5/21 - uterine fibroid, cyst, and nabothian cysts. fu by Ob.      RT shoulder pain - She was injured at work. She tore her rotator cuff.  "It comes and goes but has been ok lately". Avoid NSAIDS. She takes Tylenol prn, heating pad prn, voltaren gel prn.      Mild intermittent Asthma - Allergies/weather change are a trigger.  Typically only needs Proair 5 times/yr.   Allergies - trigger. She has had flares with weather change She had COVID in Dec. 2022 and is now back to baseline.     Acne - Pt is on Aldactone 100 mg/d.     Obesity - BMI - 31.85 - up 4 lb since last visit. Rec exercise every other day. Rec low carb diet.     HCM - Flu - 10/18/21 - at work;  Tdap - ?;  PCV 13 - ?;  PVX 23 - ?;  Shingrix - due at 52 y.o.;  COVID -19 Vaccine - (Moderna) # 1 - 2/10/21 and #2 - 3/11/21;  MGM - 3/2/22 -repeat 1 yrs;  PAP -11/1/21-neg.;  hep C screen - 11/1/21 - neg.HIV Screen -11/1/21 -neg. ; C-scope - none - due at 47 y.o.; Prev PCP - Dr. Rory Cooper; Ob/GYN - Dr. Dey;  GI - Dr. Tate; Well visit - 1/9/23    Patient Care Team:  Uma Henry MD as PCP - General (Internal Medicine)  Primary Doctor Alisha Dey Jr., MD as Obstetrician (Obstetrics)  Ayala Sharma MA as Care Coordinator     Health Maintenance:  Immunization History   Administered Date(s) Administered    COVID-19, MRNA, LN-S, PF (MODERNA FULL 0.5 ML DOSE) 02/10/2021, 03/11/2021    Influenza - Quadrivalent - MDCK - PF 11/08/2021      Health Maintenance   Topic Date Due    TETANUS VACCINE  Never done    Mammogram  03/02/2023    Lipid Panel  01/03/2027    Hepatitis C Screening  Completed        Past Medical History:  Past Medical History:   Diagnosis Date    Asthma     Gallstones     GERD " (gastroesophageal reflux disease)     H. pylori infection     Hypertension     Ovarian cyst     Rotator cuff tear, right        Past Surgical History:   has a past surgical history that includes  section; LAPAROSCOPY LYSIS OF ADHESIONS (2018); Bilateral tubal ligation; Esophagogastroduodenoscopy (N/A, 2022); Colonoscopy (N/A, 2022); Laparoscopic cholecystectomy (N/A, 2022); Breast biopsy (Left, ); Esophagogastroduodenoscopy (N/A, 2022); and Robot-assisted repair of incisional hernia using da Vamshi Xi (N/A, 2022).    Family History:  family history includes COPD in her mother; Coronary artery disease in her mother; Diabetes in her maternal grandmother; Hypertension in her maternal grandmother.     Social History:  Social History     Tobacco Use    Smoking status: Never    Smokeless tobacco: Never   Substance Use Topics    Alcohol use: Yes     Comment: Just Friday two [place] [employer] teas    Drug use: Never       Review of Systems   Constitutional:  Negative for chills, diaphoresis and fever.   HENT:  Negative for nasal congestion and sore throat.    Eyes:  Negative for visual disturbance.   Respiratory:  Negative for cough, chest tightness and shortness of breath.    Cardiovascular:  Negative for chest pain.   Gastrointestinal:  Negative for abdominal pain, blood in stool, constipation, diarrhea, nausea and vomiting.   Endocrine: Positive for cold intolerance. Negative for heat intolerance.   Genitourinary:  Negative for bladder incontinence, dysuria and frequency.   Neurological:  Negative for dizziness and headaches.   Psychiatric/Behavioral:  Negative for dysphoric mood. The patient is not nervous/anxious.       Medications:    Current Outpatient Medications:     albuterol (PROVENTIL) 5 mg/mL nebulizer solution, Inhale 2 puffs into the lungs., Disp: , Rfl:     cholecalciferol, vitamin D3, (VITAMIN D3) 50 mcg (2,000 unit) Cap capsule, Take 2 capsules (4,000 Units total)  "by mouth once daily., Disp: 180 capsule, Rfl: 3    fluticasone propionate (FLONASE) 50 mcg/actuation nasal spray, 2 sprays (100 mcg total) by Each Nostril route once daily., Disp: 18.2 mL, Rfl: 8    pantoprazole (PROTONIX) 40 MG tablet, Take 1 tablet (40 mg total) by mouth every 12 (twelve) hours., Disp: 180 tablet, Rfl: 0    spironolactone (ALDACTONE) 100 MG tablet, Take 1 tablet (100 mg total) by mouth once daily., Disp: 30 tablet, Rfl: 5    tiZANidine (ZANAFLEX) 4 MG tablet, TAKE 1 TABLET BY MOUTH EVERY DAY AT BEDTIME AS NEEDED FOR BACK PAIN, Disp: 30 tablet, Rfl: 0    tretinoin (RETIN-A) 0.05 % cream, Apply topically every evening., Disp: 45 g, Rfl: 5    valACYclovir (VALTREX) 500 MG tablet, TAKE 1 TABLET BY MOUTH TWICE DAILY FOR 3 DAYS AS NEEDED FOR  FLARE, Disp: 60 tablet, Rfl: 0    amLODIPine (NORVASC) 5 MG tablet, Take 1 tablet (5 mg total) by mouth once daily., Disp: 90 tablet, Rfl: 1    oxyCODONE (ROXICODONE) 5 MG immediate release tablet, Take 1 tablet (5 mg total) by mouth every 4 (four) hours as needed for Pain., Disp: 20 tablet, Rfl: 0     Allergies:  Review of patient's allergies indicates:  No Known Allergies    Physical Exam      Vital Signs  Temp: 98.7 °F (37.1 °C)  Pulse: 81  Resp: 18  SpO2: 98 %  BP: 129/79  BP Location: Right arm  Patient Position: Sitting  Pain Score: 0-No pain  Height and Weight  Height: 5' 6" (167.6 cm)  Weight: 89.5 kg (197 lb 5 oz)  BSA (Calculated - sq m): 2.04 sq meters  BMI (Calculated): 31.9  Weight in (lb) to have BMI = 25: 154.6      Patient Position: Sitting      Physical Exam  Vitals reviewed.   Constitutional:       General: She is not in acute distress.     Appearance: Normal appearance. She is not ill-appearing, toxic-appearing or diaphoretic.   HENT:      Head: Normocephalic and atraumatic.      Right Ear: Tympanic membrane normal.      Left Ear: Tympanic membrane normal.   Eyes:      Extraocular Movements: Extraocular movements intact.      Pupils: Pupils are " equal, round, and reactive to light.      Comments: Scleral injection   Neck:      Vascular: No carotid bruit.   Cardiovascular:      Rate and Rhythm: Normal rate and regular rhythm.      Pulses: Normal pulses.      Heart sounds: Normal heart sounds.   Pulmonary:      Effort: Pulmonary effort is normal. No respiratory distress.      Breath sounds: Normal breath sounds.   Abdominal:      General: Bowel sounds are normal. There is no distension.      Palpations: Abdomen is soft.      Tenderness: There is no abdominal tenderness. There is no guarding or rebound.   Musculoskeletal:      Cervical back: Neck supple. No tenderness.   Neurological:      General: No focal deficit present.      Mental Status: She is alert and oriented to person, place, and time.   Psychiatric:         Mood and Affect: Mood normal.         Behavior: Behavior normal.        Laboratory:  CBC:  Recent Labs   Lab 11/05/21  1415 12/29/21  1036 01/03/22  0900 08/10/22  1216   WBC 5.96 4.20  --  6.33   RBC 4.35 4.78  --  4.42   Hemoglobin 12.4 13.4   < > 12.4   Hematocrit 39.5 43.1  --  38.5   Platelets 299 336  --  311   MCV 91 90  --  87   MCH 28.5 28.0  --  28.1   MCHC 31.4 L 31.1 L  --  32.2    < > = values in this interval not displayed.       CMP:  Recent Labs   Lab 11/03/20  0710 12/29/21  1036 01/03/22  0900 08/10/22  1216 09/30/22  0934   Glucose 106 83 92   < > 86   Calcium 9.5 9.6 9.8   < > 9.6   Albumin 4.3 4.3 4.0  --   --    Total Protein 8.3 8.4 7.7  --   --    Sodium 139 137 136   < > 137   Potassium 3.4 L 4.1 4.1   < > 3.7   CO2 30 H 27 20 L   < > 24   Chloride 100 105 106   < > 103   BUN 10 9 8   < > 9   Creatinine 1.1 1.0 1.0   < > 0.8   Alkaline Phosphatase 61 66 57  --   --    ALT 9 L 14 9 L  --   --    AST 16 19 14  --   --    Total Bilirubin 0.7 0.6 0.8  --   --     < > = values in this interval not displayed.       LIPIDS:  Recent Labs   Lab 07/24/20  0724 01/03/22  0900   TSH 1.243 1.510   HDL 39 L 35 L   Cholesterol 137 151    Triglycerides 82 93   LDL Cholesterol 81.6 97.4   HDL/Cholesterol Ratio 28.5 23.2   Non-HDL Cholesterol 98 116   Total Cholesterol/HDL Ratio 3.5 4.3       TSH:  Recent Labs   Lab 07/24/20  0724 01/03/22  0900   TSH 1.243 1.510       Other:   Recent Labs   Lab 11/05/21  1415 01/03/22  0900 05/20/22  1445 11/14/22  0807   Vit D, 25-Hydroxy 10 L  --  24 L 31   Vitamin B-12 253  --   --   --    Ferritin 51 72  --   --    Iron 69 125  --   --    Transferrin 278 263  --   --    TIBC 411 389  --   --    Saturated Iron 17 L 32  --   --      Recent Labs   Lab 11/01/21  1355   Hepatitis C Ab Negative       Assessment/Plan     Ilene Garber is a 47 y.o.female with:    Normal physical exam, routine  -     TSH; Future; Expected date: 01/09/2023  -     Comprehensive Metabolic Panel; Future; Expected date: 01/09/2023  - Performed today.  Will check Basic labs.  RTC in 1 yr for fu or sooner if needed    Hypertension, unspecified type  -     Cancel: Comprehensive Metabolic Panel; Future; Expected date: 01/09/2023  -     amLODIPine (NORVASC) 5 MG tablet; Take 1 tablet (5 mg total) by mouth once daily.  Dispense: 90 tablet; Refill: 1  - Controlled.  Cont current.     Iron deficiency  - Stable.  Cont current regimen.    Vitamin D deficiency  - Controlled.  Cont current.     Mild intermittent asthma without complication  - Stable.  Cont current regimen.    Class 1 obesity due to excess calories with serious comorbidity and body mass index (BMI) of 31.0 to 31.9 in adult  - Rec diet and exercise as discussed for wt loss.      Gastroesophageal reflux disease, unspecified whether esophagitis present  - Stable.  Cont current regimen.    Screening for lipoid disorders  -     Lipid Panel; Future; Expected date: 01/09/2023         Chronic conditions status updated as per HPI.  Other than changes above, cont current medications and maintain follow up with specialists.  Follow up in about 6 months (around 7/9/2023) for fu chronic issues or  sooner if needed.      Uma Henry MD  [facility]

## 2023-01-10 ENCOUNTER — LAB VISIT (OUTPATIENT)
Dept: LAB | Facility: HOSPITAL | Age: 48
End: 2023-01-10
Payer: COMMERCIAL

## 2023-01-10 DIAGNOSIS — Z13.220 SCREENING FOR LIPOID DISORDERS: ICD-10-CM

## 2023-01-10 DIAGNOSIS — Z00.00 NORMAL PHYSICAL EXAM, ROUTINE: ICD-10-CM

## 2023-01-10 LAB
ALBUMIN SERPL BCP-MCNC: 4.1 G/DL (ref 3.5–5.2)
ALP SERPL-CCNC: 57 U/L (ref 55–135)
ALT SERPL W/O P-5'-P-CCNC: 7 U/L (ref 10–44)
ANION GAP SERPL CALC-SCNC: 8 MMOL/L (ref 8–16)
AST SERPL-CCNC: 14 U/L (ref 10–40)
BILIRUB SERPL-MCNC: 0.8 MG/DL (ref 0.1–1)
BUN SERPL-MCNC: 9 MG/DL (ref 6–20)
CALCIUM SERPL-MCNC: 9.4 MG/DL (ref 8.7–10.5)
CHLORIDE SERPL-SCNC: 105 MMOL/L (ref 95–110)
CHOLEST SERPL-MCNC: 158 MG/DL (ref 120–199)
CHOLEST/HDLC SERPL: 3.9 {RATIO} (ref 2–5)
CO2 SERPL-SCNC: 24 MMOL/L (ref 23–29)
CREAT SERPL-MCNC: 0.9 MG/DL (ref 0.5–1.4)
EST. GFR  (NO RACE VARIABLE): >60 ML/MIN/1.73 M^2
GLUCOSE SERPL-MCNC: 87 MG/DL (ref 70–110)
HDLC SERPL-MCNC: 41 MG/DL (ref 40–75)
HDLC SERPL: 25.9 % (ref 20–50)
LDLC SERPL CALC-MCNC: 101.8 MG/DL (ref 63–159)
NONHDLC SERPL-MCNC: 117 MG/DL
POTASSIUM SERPL-SCNC: 3.7 MMOL/L (ref 3.5–5.1)
PROT SERPL-MCNC: 7.9 G/DL (ref 6–8.4)
SODIUM SERPL-SCNC: 137 MMOL/L (ref 136–145)
TRIGL SERPL-MCNC: 76 MG/DL (ref 30–150)
TSH SERPL DL<=0.005 MIU/L-ACNC: 1.32 UIU/ML (ref 0.4–4)

## 2023-01-10 PROCEDURE — 36415 COLL VENOUS BLD VENIPUNCTURE: CPT | Mod: PN | Performed by: INTERNAL MEDICINE

## 2023-01-10 PROCEDURE — 80053 COMPREHEN METABOLIC PANEL: CPT | Performed by: INTERNAL MEDICINE

## 2023-01-10 PROCEDURE — 80061 LIPID PANEL: CPT | Performed by: INTERNAL MEDICINE

## 2023-01-10 PROCEDURE — 84443 ASSAY THYROID STIM HORMONE: CPT | Performed by: INTERNAL MEDICINE

## 2023-01-13 ENCOUNTER — TELEPHONE (OUTPATIENT)
Dept: PRIMARY CARE CLINIC | Facility: CLINIC | Age: 48
End: 2023-01-13
Payer: COMMERCIAL

## 2023-01-13 NOTE — PROGRESS NOTES
I sent pt a my chart message -  I reviewed your labs.  Your thyroid functions are normal.  Your Cholesterol looked good.  Your kidney function and liver functions looked good.    No further recommendations at this time.    Dr. CONTRERAS

## 2023-01-13 NOTE — TELEPHONE ENCOUNTER
----- Message from Uma Henry MD sent at 1/13/2023  2:42 PM CST -----  I sent pt a my chart message -  I reviewed your labs.  Your thyroid functions are normal.  Your Cholesterol looked good.  Your kidney function and liver functions looked good.    No further recommendations at this time.    Dr. CONTRERAS

## 2023-02-27 ENCOUNTER — OFFICE VISIT (OUTPATIENT)
Dept: URGENT CARE | Facility: CLINIC | Age: 48
End: 2023-02-27
Payer: COMMERCIAL

## 2023-02-27 VITALS
HEIGHT: 66 IN | HEART RATE: 78 BPM | DIASTOLIC BLOOD PRESSURE: 94 MMHG | WEIGHT: 197 LBS | SYSTOLIC BLOOD PRESSURE: 147 MMHG | OXYGEN SATURATION: 100 % | BODY MASS INDEX: 31.66 KG/M2 | RESPIRATION RATE: 20 BRPM | TEMPERATURE: 98 F

## 2023-02-27 DIAGNOSIS — J06.9 UPPER RESPIRATORY TRACT INFECTION, UNSPECIFIED TYPE: Primary | ICD-10-CM

## 2023-02-27 DIAGNOSIS — J02.9 SORE THROAT: ICD-10-CM

## 2023-02-27 LAB
CTP QC/QA: YES
MOLECULAR STREP A: NEGATIVE

## 2023-02-27 PROCEDURE — 3077F SYST BP >= 140 MM HG: CPT | Mod: CPTII,S$GLB,,

## 2023-02-27 PROCEDURE — 1159F MED LIST DOCD IN RCRD: CPT | Mod: CPTII,S$GLB,,

## 2023-02-27 PROCEDURE — 3008F PR BODY MASS INDEX (BMI) DOCUMENTED: ICD-10-PCS | Mod: CPTII,S$GLB,,

## 2023-02-27 PROCEDURE — 3077F PR MOST RECENT SYSTOLIC BLOOD PRESSURE >= 140 MM HG: ICD-10-PCS | Mod: CPTII,S$GLB,,

## 2023-02-27 PROCEDURE — 99213 PR OFFICE/OUTPT VISIT, EST, LEVL III, 20-29 MIN: ICD-10-PCS | Mod: S$GLB,,,

## 2023-02-27 PROCEDURE — 3080F DIAST BP >= 90 MM HG: CPT | Mod: CPTII,S$GLB,,

## 2023-02-27 PROCEDURE — 1160F RVW MEDS BY RX/DR IN RCRD: CPT | Mod: CPTII,S$GLB,,

## 2023-02-27 PROCEDURE — 87651 POCT STREP A MOLECULAR: ICD-10-PCS | Mod: QW,S$GLB,,

## 2023-02-27 PROCEDURE — 1160F PR REVIEW ALL MEDS BY PRESCRIBER/CLIN PHARMACIST DOCUMENTED: ICD-10-PCS | Mod: CPTII,S$GLB,,

## 2023-02-27 PROCEDURE — 99213 OFFICE O/P EST LOW 20 MIN: CPT | Mod: S$GLB,,,

## 2023-02-27 PROCEDURE — 1159F PR MEDICATION LIST DOCUMENTED IN MEDICAL RECORD: ICD-10-PCS | Mod: CPTII,S$GLB,,

## 2023-02-27 PROCEDURE — 87651 STREP A DNA AMP PROBE: CPT | Mod: QW,S$GLB,,

## 2023-02-27 PROCEDURE — 3008F BODY MASS INDEX DOCD: CPT | Mod: CPTII,S$GLB,,

## 2023-02-27 PROCEDURE — 3080F PR MOST RECENT DIASTOLIC BLOOD PRESSURE >= 90 MM HG: ICD-10-PCS | Mod: CPTII,S$GLB,,

## 2023-02-27 RX ORDER — BENZONATATE 200 MG/1
200 CAPSULE ORAL 3 TIMES DAILY PRN
Qty: 30 CAPSULE | Refills: 0 | Status: SHIPPED | OUTPATIENT
Start: 2023-02-27 | End: 2023-03-09

## 2023-02-27 RX ORDER — PROMETHAZINE HYDROCHLORIDE AND DEXTROMETHORPHAN HYDROBROMIDE 6.25; 15 MG/5ML; MG/5ML
5 SYRUP ORAL EVERY 6 HOURS PRN
Qty: 118 ML | Refills: 0 | Status: SHIPPED | OUTPATIENT
Start: 2023-02-27 | End: 2023-03-09

## 2023-02-27 NOTE — PROGRESS NOTES
"Subjective:       Patient ID: Ilene Garber is a 47 y.o. female.    Vitals:  height is 5' 6" (1.676 m) and weight is 89.4 kg (197 lb). Her oral temperature is 98.4 °F (36.9 °C). Her blood pressure is 147/94 (abnormal) and her pulse is 78. Her respiration is 20 and oxygen saturation is 100%.     Chief Complaint: Otalgia    Patient states that after halley gras she started to feel bad. She states having sore throat, ear pain, cough. She denies fever, CP, SOB.    Otalgia   There is pain in both ears. This is a new problem. The current episode started in the past 7 days. The problem occurs constantly. The problem has been gradually worsening. The pain is at a severity of 8/10. The pain is moderate. Associated symptoms include coughing, ear discharge, headaches and a sore throat. Pertinent negatives include no abdominal pain, diarrhea, hearing loss, neck pain, rash, rhinorrhea or vomiting. Treatments tried: Zyrtec. The treatment provided no relief. There is no history of a chronic ear infection, hearing loss or a tympanostomy tube.     Constitution: Negative for chills and fever.   HENT:  Positive for ear pain, ear discharge and sore throat. Negative for hearing loss.    Neck: Negative for neck pain.   Cardiovascular:  Negative for chest pain and sob on exertion.   Respiratory:  Positive for cough and sputum production (mostly dry). Negative for shortness of breath.    Gastrointestinal:  Negative for abdominal pain, vomiting and diarrhea.   Musculoskeletal:  Negative for muscle ache.   Skin:  Negative for rash.   Neurological:  Positive for headaches.     Objective:      Physical Exam   Constitutional:  Non-toxic appearance. She does not appear ill. No distress. normal  HENT:   Head: Normocephalic.   Ears:   Right Ear: Tympanic membrane, external ear and ear canal normal. impacted cerumen  Left Ear: Tympanic membrane, external ear and ear canal normal. impacted cerumen  Nose: Congestion present. No rhinorrhea. "   Mouth/Throat: Posterior oropharyngeal erythema present. No oropharyngeal exudate. Oropharynx is clear.   Cardiovascular: Normal rate, regular rhythm and normal heart sounds.   No murmur heard.Exam reveals no gallop and no friction rub.   Pulmonary/Chest: Effort normal and breath sounds normal. No stridor. No respiratory distress. She has no wheezes. She has no rhonchi. She has no rales. She exhibits no tenderness.         Comments: Patient is in no respiratory distress. Breath sounds even, unlabored, and clear to auscultation bilaterally. No accessory muscle usage. Patient able to speak in complete sentences with ease.    Abdominal: Normal appearance.   Neurological: She is alert.   Skin: Skin is not diaphoretic.   Nursing note and vitals reviewed.      Results for orders placed or performed in visit on 02/27/23   POCT Strep A, Molecular   Result Value Ref Range    Molecular Strep A, POC Negative Negative     Acceptable Yes      Assessment:       1. Upper respiratory tract infection, unspecified type    2. Sore throat          Plan:     Previous notes reviewed.  Vital signs reviewed. /94.  Labs ordered. Labs reviewed. STREP A NEGATIVE.  Discussed URI, home care, tx options, and given follow up precautions.  Patient was briefed on my thought process and diagnosis.   Patient involved with the treatment plan and agreed to the plan.  Patient informed on warning signs, patient understood warning signs and to go to urgent care or ER if warning signs appear.    Patient Instructions   Please drink plenty of fluids.  Please get plenty of rest.  Please utilize saltwater gargles for sore throat.  Please utilize warm tea, and lemon for sore throat relief.  Please utilize over-the-counter throat numbing spray and lozenges for sore throat relief.    Please return here or go to the Emergency Department for any concerns or worsening of condition.    Please use MAGIC MOUTHWASH for sore throat relief.  Please  take TESSALON during the day for cough.  Please take PROMETHAZINE DM at night for cough.   You were prescribed Promethazine DM, this medication can make you drowsy, please avoid driving or operating heavy machinery while taking this medication.     If you do not have Hypertension or any history of palpitations, it is ok to take over the counter Sudafed or Mucinex D or Allegra-D or Claritin-D or Zyrtec-D.  If you do take one of the above, it is ok to combine that with plain over the counter Mucinex or Allegra or Claritin or Zyrtec.  If for example you are taking Zyrtec -D, you can combine that with Mucinex, but not Mucinex-D.  If you are taking Mucinex-D, you can combine that with plain Allegra or Claritin or Zyrtec.   If you do have Hypertension or palpitations, it is safe to take Coricidin HBP for relief of sinus symptoms.  We recommend you take over the counter Flonase (Fluticasone) or another nasally inhaled steroid unless you are already taking one.  Nasal irrigation with a saline spray or Netti Pot like device per their directions is also recommended.  If not allergic, please take over the counter Tylenol (Acetaminophen) and/or Motrin (Ibuprofen) as directed for control of pain and/or fever.  Please follow up with your primary care doctor or specialist as needed.    If you  smoke, please stop smoking.    Upper respiratory tract infection, unspecified type  -     (Magic mouthwash) 1:1:1 diphenhydrAMINE(Benadryl) 12.5mg/5ml liq, aluminum & magnesium hydroxide-simethicone (Maalox), LIDOcaine viscous 2%; Swish and spit 10 mLs every 4 (four) hours as needed (Sore Throat).  Dispense: 180 mL; Refill: 0  -     benzonatate (TESSALON) 200 MG capsule; Take 1 capsule (200 mg total) by mouth 3 (three) times daily as needed for Cough.  Dispense: 30 capsule; Refill: 0  -     promethazine-dextromethorphan (PROMETHAZINE-DM) 6.25-15 mg/5 mL Syrp; Take 5 mLs by mouth every 6 (six) hours as needed (cough).  Dispense: 118 mL;  Refill: 0    Sore throat  -     POCT Strep A, Nathalie Altman PA-C

## 2023-02-28 NOTE — PATIENT INSTRUCTIONS
Please drink plenty of fluids.  Please get plenty of rest.  Please utilize saltwater gargles for sore throat.  Please utilize warm tea, and lemon for sore throat relief.  Please utilize over-the-counter throat numbing spray and lozenges for sore throat relief.    Please return here or go to the Emergency Department for any concerns or worsening of condition.    Please use MAGIC MOUTHWASH for sore throat relief.  Please take TESSALON during the day for cough.  Please take PROMETHAZINE DM at night for cough.   You were prescribed Promethazine DM, this medication can make you drowsy, please avoid driving or operating heavy machinery while taking this medication.     If you do not have Hypertension or any history of palpitations, it is ok to take over the counter Sudafed or Mucinex D or Allegra-D or Claritin-D or Zyrtec-D.  If you do take one of the above, it is ok to combine that with plain over the counter Mucinex or Allegra or Claritin or Zyrtec.  If for example you are taking Zyrtec -D, you can combine that with Mucinex, but not Mucinex-D.  If you are taking Mucinex-D, you can combine that with plain Allegra or Claritin or Zyrtec.   If you do have Hypertension or palpitations, it is safe to take Coricidin HBP for relief of sinus symptoms.  We recommend you take over the counter Flonase (Fluticasone) or another nasally inhaled steroid unless you are already taking one.  Nasal irrigation with a saline spray or Netti Pot like device per their directions is also recommended.  If not allergic, please take over the counter Tylenol (Acetaminophen) and/or Motrin (Ibuprofen) as directed for control of pain and/or fever.  Please follow up with your primary care doctor or specialist as needed.    If you  smoke, please stop smoking.

## 2023-03-08 DIAGNOSIS — Z12.31 OTHER SCREENING MAMMOGRAM: ICD-10-CM

## 2023-03-22 ENCOUNTER — HOSPITAL ENCOUNTER (EMERGENCY)
Facility: HOSPITAL | Age: 48
Discharge: HOME | End: 2023-03-22
Attending: EMERGENCY MEDICINE
Payer: COMMERCIAL

## 2023-03-22 VITALS
TEMPERATURE: 98 F | SYSTOLIC BLOOD PRESSURE: 128 MMHG | HEART RATE: 83 BPM | HEIGHT: 66 IN | BODY MASS INDEX: 29.89 KG/M2 | OXYGEN SATURATION: 98 % | WEIGHT: 186 LBS | DIASTOLIC BLOOD PRESSURE: 83 MMHG | RESPIRATION RATE: 20 BRPM

## 2023-03-22 DIAGNOSIS — M54.9 BACK PAIN: ICD-10-CM

## 2023-03-22 DIAGNOSIS — R05.9 COUGH: ICD-10-CM

## 2023-03-22 DIAGNOSIS — M54.9 UPPER BACK PAIN ON LEFT SIDE: Primary | ICD-10-CM

## 2023-03-22 PROCEDURE — 99284 EMERGENCY DEPT VISIT MOD MDM: CPT | Mod: 25

## 2023-03-22 PROCEDURE — 93005 ELECTROCARDIOGRAM TRACING: CPT

## 2023-03-22 PROCEDURE — 93010 EKG 12-LEAD: ICD-10-PCS | Mod: ,,, | Performed by: SPECIALIST

## 2023-03-22 PROCEDURE — 93010 ELECTROCARDIOGRAM REPORT: CPT | Mod: ,,, | Performed by: SPECIALIST

## 2023-03-22 RX ORDER — METHOCARBAMOL 500 MG/1
1000 TABLET, FILM COATED ORAL 3 TIMES DAILY PRN
Qty: 20 TABLET | Refills: 0 | Status: SHIPPED | OUTPATIENT
Start: 2023-03-22 | End: 2023-03-27

## 2023-03-22 NOTE — FIRST PROVIDER EVALUATION
Emergency Department TeleTriage Encounter Note      CHIEF COMPLAINT    Chief Complaint   Patient presents with    Back Pain     Back pain left side under her shoulder blade x 1 week at first thought it was gas and has been taking OTC meds with no relief        VITAL SIGNS   Initial Vitals [03/22/23 1420]   BP Pulse Resp Temp SpO2   128/83 83 20 98.4 °F (36.9 °C) 98 %      MAP       --            ALLERGIES    Review of patient's allergies indicates:  No Known Allergies    PROVIDER TRIAGE NOTE  Patient presents with complaint of left sided back pain worse with movement and laying down. Denied CP or SOB. Denied injury. Been present for a week.      Phy:   Constitutional: well nourished, well developed, appearing stated age, NAD   HEENT: NCAT, symmetrical lids, No obvious facial deformity.  Normal phonation. Normal Conjunctiva   Neck: NAROM   Respiratory: Normal effort.  No obvious use of accessory muscles   Musculoskeletal: Moved upper extremities well   Neuro: Alert, answers questions appropriately    Psych: appropriate mood and affect      Initial orders will be placed and care will be transferred to an alternate provider when patient is roomed for a full evaluation. Any additional orders and the final disposition will be determined by that provider.        ORDERS  Labs Reviewed - No data to display    ED Orders (720h ago, onward)      None              Virtual Visit Note: The provider triage portion of this emergency department evaluation and documentation was performed via [employer], a HIPAA-compliant telemedicine application, in concert with a tele-presenter in the room. A face to face patient evaluation with one of my colleagues will occur once the patient is placed in an emergency department room.      DISCLAIMER: This note was prepared with [employer] voice recognition transcription software. Garbled syntax, mangled pronouns, and other bizarre constructions may be attributed to that software system.

## 2023-04-08 DIAGNOSIS — K44.9 HIATAL HERNIA: ICD-10-CM

## 2023-04-08 DIAGNOSIS — R11.0 NAUSEA: ICD-10-CM

## 2023-04-08 DIAGNOSIS — K21.9 GASTROESOPHAGEAL REFLUX DISEASE, UNSPECIFIED WHETHER ESOPHAGITIS PRESENT: ICD-10-CM

## 2023-04-08 DIAGNOSIS — K22.10 EROSIVE ESOPHAGITIS: ICD-10-CM

## 2023-04-09 ENCOUNTER — PATIENT MESSAGE (OUTPATIENT)
Dept: GASTROENTEROLOGY | Facility: CLINIC | Age: 48
End: 2023-04-09
Payer: COMMERCIAL

## 2023-04-09 DIAGNOSIS — K21.9 GASTROESOPHAGEAL REFLUX DISEASE, UNSPECIFIED WHETHER ESOPHAGITIS PRESENT: Primary | ICD-10-CM

## 2023-04-09 RX ORDER — PANTOPRAZOLE SODIUM 40 MG/1
TABLET, DELAYED RELEASE ORAL
Qty: 90 TABLET | Refills: 0 | Status: SHIPPED | OUTPATIENT
Start: 2023-04-09 | End: 2023-04-09 | Stop reason: DRUGHIGH

## 2023-04-09 RX ORDER — PANTOPRAZOLE SODIUM 20 MG/1
20 TABLET, DELAYED RELEASE ORAL
Qty: 90 TABLET | Refills: 0 | Status: SHIPPED | OUTPATIENT
Start: 2023-04-09 | End: 2023-07-10

## 2023-06-11 DIAGNOSIS — A60.00 GENITAL HERPES SIMPLEX, UNSPECIFIED SITE: ICD-10-CM

## 2023-06-11 NOTE — TELEPHONE ENCOUNTER
Care Due:                  Date            Visit Type   Department     Provider  --------------------------------------------------------------------------------                                EP -                              PRIMARY      [facility]  Last Visit: 01-      CARE ([facility])   CARE           Uma Henry                              EP -                              PRIMARY      [facility]  Next Visit: 07-      CARE ([facility])   CARE           Uma Henry                                                            Last  Test          Frequency    Reason                     Performed    Due Date  --------------------------------------------------------------------------------    CBC.........  12 months..  valACYclovir.............  08-   08-    [facility] Embedded Care Due Messages. Reference number: [id number].   6/11/2023 11:42:46 AM CDT

## 2023-06-12 RX ORDER — VALACYCLOVIR HYDROCHLORIDE 500 MG/1
TABLET, FILM COATED ORAL
Qty: 60 TABLET | Refills: 0 | Status: SHIPPED | OUTPATIENT
Start: 2023-06-12

## 2023-07-09 NOTE — PROGRESS NOTES
[facility] Note    Chief Complaint      Chief Complaint   Patient presents with    Follow-up       History of Present Illness      Ilene Garber is a 47 y.o. AAF with HTN, Hypokalemia, GERD, Mild Intermittent Asthma, Dysmnenorrhea, RT Rotator cuff tear presents to fu well visit. Last virtual visit - 1/9/23     H/o gastritis - EGD 1/19/22- H. pylori gastritis Mild active proctitis. She was tx with Quad Tx. Repeat EGD 7/12/22 - 1 cm hiatal hernia.  Pt on Protonix BID. She is being fu by GI, Dr. Tate.  Planning for upcoming appt. Avoids NSAIDs.      H/o gallstones - CT abd 11/30/21 - Nitrogen containing cholelithiasis with small volume of nonspecific pericholecystic fluid.  No evidence of wall thickening or pericholecystic fat stranding. If there is clinical concern for cholecystitis, recommend HIDA scan. Few scattered prominent mesenteric lymph nodes without evidence of pathologic lymphadenopathy.GI referred to general surgery for evaluation of postprandial abdominal pain in gallstone seen on CT scan.  Pt is s/p lap delilah 2/7/22 with Dr. Thomason.     Iman umbilical hernia s/p repair - 9/22/22 -Doing better.      Iron def - Pt is iron deficient but not anemic and Dr. Tate had recommend that she take ferrous gluconate one 324mg pill once daily when on her cycle. She has not been on it. Resolved.      Vit D def -  Resolved. Pt completed Ergo per Dr. Tate 50,000 units ONCE A WEEK (every 7-days) for 12 wks and is now on Vitamin D3 4,000 units/d OTC.     Low HDL - Her Cholesterol looks ok but her HDL (good Cholesterol) is low.  I recommend exercise to increase this.  Repeat FLP.      Hypokalemia  3.7 - resolved.  Cont to monitor K+ on aldactone.      HSV genital - Pt on Valtrex prn.      HTN - We recently switched her from HCTZ to Spironolactone 25 mg/d.  She is doing well. Her potassium improved. She is now on amlodipine 5 mg/d since 9/26/22.  Derm inc her aldactone from 25 mg to 50 and  "then 100 mg for tx of her acne. Doing well. No dizziness.   Cont to monitor K+. She had to let 3 people go today.      GERD- Pt on Protonix BID. Fu by GI, Dr. Tate.      Dysmenorrhea - + heavy cycles.  Improved s/p D&C. She takes OTC iron x 2 days around her cycle.  + Iron def. Pelvic U/S - 11/5/21 - uterine fibroid, cyst, and nabothian cysts. fu by Ob.      RT shoulder pain - She was injured at work. She tore her rotator cuff.  "It comes and goes but has been ok lately". Avoid NSAIDS. She takes Tylenol prn, heating pad prn, voltaren gel prn. Xanaflex prn.      Mild intermittent Asthma - Allergies/weather change are a trigger.  Typically only needs Proair 5 times/yr.   Allergies - trigger. She has had flares with weather change. Zyrtec and flonase prn help.      Acne - Pt is on Aldactone 100 mg/d.     Obesity - BMI - 32.88 - up 6 lb since last visit. Rec exercise every other day. Rec low carb diet.     HCM - Flu - 1/24/23;  Tdap - admin 7/10/23;  PCV 13 - ?;  PVX 23 - ?;  Shingrix - due at 52 y.o.;  COVID -19 Vaccine - (Moderna) # 1 - 2/10/21 and #2 - 3/11/21;  MGM - 3/2/22 -repeat 1 yrs- has order;  PAP -11/1/21-neg.;  hep C screen - 11/1/21 - neg.HIV Screen -11/1/21 -neg. ; C-scope - 1/19/22 - diverticulosis, hemorrhoids, erythema - c/w mild active proctitis; EGD - 1/19/22 - Moderate chronic antral and oxyntic gastritis with mild activity Prev PCP - Dr. Rory Cooper; Ob/GYN - Dr. Dey;  GI - Dr. Tate; Well visit - 1/9/23      Patient Care Team:  Uma Henry MD as PCP - General (Internal Medicine)  Primary Doctor Alisha Dey Jr., MD as Obstetrician (Obstetrics)  Ayala Sharma MA as Care Coordinator     Health Maintenance:  Immunization History   Administered Date(s) Administered    COVID-19, MRNA, LN-S, PF (MODERNA FULL 0.5 ML DOSE) 02/10/2021, 03/11/2021    Influenza - Quadrivalent - [facility] - PF 11/08/2021, 01/24/2023    Tdap 07/10/2023      Health Maintenance   Topic Date Due    " Mammogram  2023    Lipid Panel  01/10/2028    TETANUS VACCINE  07/10/2033    Hepatitis C Screening  Completed        Past Medical History:  Past Medical History:   Diagnosis Date    Asthma     Gallstones     GERD (gastroesophageal reflux disease)     H. pylori infection     Hypertension     Ovarian cyst     Rotator cuff tear, right        Past Surgical History:   has a past surgical history that includes  section; LAPAROSCOPY LYSIS OF ADHESIONS (2018); Bilateral tubal ligation; Esophagogastroduodenoscopy (N/A, 2022); Colonoscopy (N/A, 2022); Laparoscopic cholecystectomy (N/A, 2022); Breast biopsy (Left, ); Esophagogastroduodenoscopy (N/A, 2022); and Robot-assisted repair of incisional hernia using da Vamshi Xi (N/A, 2022).    Family History:  family history includes COPD in her mother; Coronary artery disease in her mother; Diabetes in her maternal grandmother; Hypertension in her maternal grandmother.     Social History:  Social History     Tobacco Use    Smoking status: Never    Smokeless tobacco: Never   Substance Use Topics    Alcohol use: Yes     Comment: Just Friday two [place] Ice teas    Drug use: Never       Review of Systems   Constitutional:  Positive for fatigue. Negative for chills, diaphoresis and fever.   HENT:  Negative for hearing loss.    Eyes:  Negative for visual disturbance.   Respiratory:  Negative for chest tightness and shortness of breath.    Cardiovascular:  Positive for leg swelling. Negative for chest pain and palpitations.        If misses aldactone   Gastrointestinal:  Positive for reflux. Negative for abdominal pain, blood in stool, constipation, diarrhea, nausea and vomiting.   Endocrine: Negative for cold intolerance, heat intolerance and polydipsia.   Genitourinary:  Negative for dysuria and frequency.   Musculoskeletal:  Positive for back pain.   Neurological:  Positive for numbness. Negative for dizziness, weakness and  "headaches.        Tingling in hands   Psychiatric/Behavioral:  Negative for dysphoric mood. The patient is not nervous/anxious.       Medications:    Current Outpatient Medications:     albuterol (PROVENTIL) 5 mg/mL nebulizer solution, Inhale 2 puffs into the lungs., Disp: , Rfl:     fluticasone propionate (FLONASE) 50 mcg/actuation nasal spray, 2 sprays (100 mcg total) by Each Nostril route once daily., Disp: 18.2 mL, Rfl: 8    pantoprazole (PROTONIX) 20 MG tablet, Take 1 tablet (20 mg total) by mouth before breakfast. (Patient taking differently: Take 40 mg by mouth before breakfast.), Disp: 90 tablet, Rfl: 0    spironolactone (ALDACTONE) 100 MG tablet, Take 1 tablet (100 mg total) by mouth once daily., Disp: 30 tablet, Rfl: 5    tiZANidine (ZANAFLEX) 4 MG tablet, TAKE 1 TABLET BY MOUTH EVERY DAY AT BEDTIME AS NEEDED FOR BACK PAIN, Disp: 30 tablet, Rfl: 0    tretinoin (RETIN-A) 0.05 % cream, Apply topically every evening., Disp: 45 g, Rfl: 5    valACYclovir (VALTREX) 500 MG tablet, TAKE 1 TABLET BY MOUTH TWICE DAILY FOR 3 DAYS AS NEEDED FOR  FLARE, Disp: 60 tablet, Rfl: 0    amLODIPine (NORVASC) 5 MG tablet, Take 1 tablet (5 mg total) by mouth once daily., Disp: 90 tablet, Rfl: 1    ciclopirox (PENLAC) 8 % Soln, Apply topically nightly. Apply to affected  toenail(s) and adjacent skin once daily in combination with weekly nail trimming and periodic nail debridement. Remove with alcohol every 7 days; continue therapy until nail clearance (max duration 48wks), Disp: 6.6 mL, Rfl: 2    diphth,pertus,acell,,tetanus (BOOSTRIX TDAP) 2.5-8-5 Lf-mcg-Lf/0.5mL Syrg injection, Inject into the muscle., Disp: 0.5 mL, Rfl: 0     Allergies:  Review of patient's allergies indicates:  No Known Allergies    Physical Exam      Vital Signs  Temp: 98 °F (36.7 °C)  Temp Source: Oral  Pulse: 82  Resp: 16  SpO2: 96 %  BP: 130/84  BP Location: Right arm  Patient Position: Sitting  Pain Score: 0-No pain  Height and Weight  Height: 5' 6" " (167.6 cm)  Weight: 92.4 kg (203 lb 11.3 oz)  BSA (Calculated - sq m): 2.07 sq meters  BMI (Calculated): 32.9  Weight in (lb) to have BMI = 25: 154.6      Patient Position: Sitting      Physical Exam  Vitals reviewed.   Constitutional:       General: She is not in acute distress.     Appearance: Normal appearance. She is not ill-appearing, toxic-appearing or diaphoretic.   HENT:      Head: Normocephalic and atraumatic.      Right Ear: Tympanic membrane normal.      Left Ear: Tympanic membrane normal.   Eyes:      Extraocular Movements: Extraocular movements intact.      Conjunctiva/sclera: Conjunctivae normal.      Pupils: Pupils are equal, round, and reactive to light.   Neck:      Vascular: No carotid bruit.   Cardiovascular:      Rate and Rhythm: Normal rate and regular rhythm.      Pulses: Normal pulses.      Heart sounds: Normal heart sounds. No murmur heard.  Pulmonary:      Effort: No respiratory distress.      Breath sounds: Normal breath sounds. No wheezing.   Abdominal:      General: Bowel sounds are normal. There is no distension.      Palpations: Abdomen is soft.      Tenderness: There is no abdominal tenderness. There is no guarding or rebound.   Musculoskeletal:      Comments: Pain on extension of back not on forward flexion. No pain on rotation of back to ER or Left. Neg straight leg raise blair. NTTP over spine.  Paraspinal muscles - tense NTTP. NTTP over Blair PSIS joints.    Neurological:      General: No focal deficit present.      Mental Status: She is alert and oriented to person, place, and time.   Psychiatric:         Mood and Affect: Mood normal.         Behavior: Behavior normal.        Laboratory:  CBC:  Recent Labs   Lab 11/05/21  1415 12/29/21  1036 01/03/22  0900 08/10/22  1216   WBC 5.96 4.20  --  6.33   RBC 4.35 4.78  --  4.42   Hemoglobin 12.4 13.4   < > 12.4   Hematocrit 39.5 43.1  --  38.5   Platelets 299 336  --  311   MCV 91 90  --  87   MCH 28.5 28.0  --  28.1   MCHC 31.4 L 31.1 L   --  32.2    < > = values in this interval not displayed.       CMP:  Recent Labs   Lab 12/29/21  1036 01/03/22  0900 08/10/22  1216 01/10/23  0825   Glucose 83 92   < > 87   Calcium 9.6 9.8   < > 9.4   Albumin 4.3 4.0  --  4.1   Total Protein 8.4 7.7  --  7.9   Sodium 137 136   < > 137   Potassium 4.1 4.1   < > 3.7   CO2 27 20 L   < > 24   Chloride 105 106   < > 105   BUN 9 8   < > 9   Creatinine 1.0 1.0   < > 0.9   Alkaline Phosphatase 66 57  --  57   ALT 14 9 L  --  7 L   AST 19 14  --  14   Total Bilirubin 0.6 0.8  --  0.8    < > = values in this interval not displayed.     LIPIDS:  Recent Labs   Lab 07/24/20  0724 01/03/22  0900 01/10/23  0825   TSH 1.243 1.510 1.316   HDL 39 L 35 L 41   Cholesterol 137 151 158   Triglycerides 82 93 76   LDL Cholesterol 81.6 97.4 101.8   HDL/Cholesterol Ratio 28.5 23.2 25.9   Non-HDL Cholesterol 98 116 117   Total Cholesterol/HDL Ratio 3.5 4.3 3.9       TSH:  Recent Labs   Lab 07/24/20  0724 01/03/22  0900 01/10/23  0825   TSH 1.243 1.510 1.316       Other:   Recent Labs   Lab 11/05/21  1415 01/03/22  0900 05/20/22  1445 11/14/22  0807   Vit D, 25-Hydroxy 10 L  --  24 L 31   Vitamin B-12 253  --   --   --    Ferritin 51 72  --   --    Iron 69 125  --   --    Transferrin 278 263  --   --    TIBC 411 389  --   --    Saturated Iron 17 L 32  --   --      Recent Labs   Lab 11/01/21  1355   Hepatitis C Ab Negative       Assessment/Plan     Ilene Garber is a 47 y.o.female with:    Hypertension, unspecified type  -     amLODIPine (NORVASC) 5 MG tablet; Take 1 tablet (5 mg total) by mouth once daily.  Dispense: 90 tablet; Refill: 1  -     CBC Auto Differential; Future; Expected date: 07/10/2023  -     Comprehensive Metabolic Panel; Future; Expected date: 07/10/2023  - Controlled.  Cont current.      Mild intermittent asthma without complication  - Stable.  Cont current regimen.    Iron deficiency  -     Ferritin; Future; Expected date: 07/10/2023  -     Iron and TIBC; Future;  Expected date: 07/10/2023  -     CBC Auto Differential; Future; Expected date: 07/10/2023  - Repeat iron, Cbc, Vit D.     Gastroesophageal reflux disease, unspecified whether esophagitis present  - Stable.  Cont current regimen. Planning fu with GI.     Vitamin D deficiency  -     Calcitriol; Future; Expected date: 07/10/2023  -Cont suppl. Repeat Vit D level.     Onychomycosis  -     ciclopirox (PENLAC) 8 % Soln; Apply topically nightly. Apply to affected  toenail(s) and adjacent skin once daily in combination with weekly nail trimming and periodic nail debridement. Remove with alcohol every 7 days; continue therapy until nail clearance (max duration 48wks)  Dispense: 6.6 mL; Refill: 2    Intermittent low back pain  - Rec tizanidine prn, low back stretches/yoga prn.  Heating pad x 20 min. intervals prn. Avoid NSAIDs.     Class 1 obesity due to excess calories with serious comorbidity and body mass index (BMI) of 32.0 to 32.9 in adult  - Rec diet and exercise for wt loss.        Chronic conditions status updated as per HPI.  Other than changes above, cont current medications and maintain follow up with specialists.   Follow up in about 6 months (around 1/10/2024) for well visit or sooner if needed.      Uma Henry MD  [facility]

## 2023-07-10 ENCOUNTER — LAB VISIT (OUTPATIENT)
Dept: LAB | Facility: HOSPITAL | Age: 48
End: 2023-07-10
Attending: INTERNAL MEDICINE
Payer: COMMERCIAL

## 2023-07-10 ENCOUNTER — OFFICE VISIT (OUTPATIENT)
Dept: PRIMARY CARE CLINIC | Facility: CLINIC | Age: 48
End: 2023-07-10
Payer: COMMERCIAL

## 2023-07-10 VITALS
HEART RATE: 82 BPM | OXYGEN SATURATION: 96 % | WEIGHT: 203.69 LBS | RESPIRATION RATE: 16 BRPM | HEIGHT: 66 IN | BODY MASS INDEX: 32.73 KG/M2 | DIASTOLIC BLOOD PRESSURE: 84 MMHG | TEMPERATURE: 98 F | SYSTOLIC BLOOD PRESSURE: 130 MMHG

## 2023-07-10 DIAGNOSIS — K21.9 GASTROESOPHAGEAL REFLUX DISEASE, UNSPECIFIED WHETHER ESOPHAGITIS PRESENT: ICD-10-CM

## 2023-07-10 DIAGNOSIS — I10 HYPERTENSION, UNSPECIFIED TYPE: Primary | ICD-10-CM

## 2023-07-10 DIAGNOSIS — E55.9 VITAMIN D DEFICIENCY: ICD-10-CM

## 2023-07-10 DIAGNOSIS — E66.09 CLASS 1 OBESITY DUE TO EXCESS CALORIES WITH SERIOUS COMORBIDITY AND BODY MASS INDEX (BMI) OF 32.0 TO 32.9 IN ADULT: ICD-10-CM

## 2023-07-10 DIAGNOSIS — B35.1 ONYCHOMYCOSIS: ICD-10-CM

## 2023-07-10 DIAGNOSIS — E61.1 IRON DEFICIENCY: ICD-10-CM

## 2023-07-10 DIAGNOSIS — I10 HYPERTENSION, UNSPECIFIED TYPE: ICD-10-CM

## 2023-07-10 DIAGNOSIS — M54.50 INTERMITTENT LOW BACK PAIN: ICD-10-CM

## 2023-07-10 DIAGNOSIS — J45.20 MILD INTERMITTENT ASTHMA WITHOUT COMPLICATION: ICD-10-CM

## 2023-07-10 LAB
IRON SERPL-MCNC: 80 UG/DL (ref 30–160)
SATURATED IRON: 21 % (ref 20–50)
TOTAL IRON BINDING CAPACITY: 386 UG/DL (ref 250–450)
TRANSFERRIN SERPL-MCNC: 261 MG/DL (ref 200–375)

## 2023-07-10 PROCEDURE — 1160F PR REVIEW ALL MEDS BY PRESCRIBER/CLIN PHARMACIST DOCUMENTED: ICD-10-PCS | Mod: CPTII,S$GLB,, | Performed by: INTERNAL MEDICINE

## 2023-07-10 PROCEDURE — 1159F PR MEDICATION LIST DOCUMENTED IN MEDICAL RECORD: ICD-10-PCS | Mod: CPTII,S$GLB,, | Performed by: INTERNAL MEDICINE

## 2023-07-10 PROCEDURE — 3008F BODY MASS INDEX DOCD: CPT | Mod: CPTII,S$GLB,, | Performed by: INTERNAL MEDICINE

## 2023-07-10 PROCEDURE — 85025 COMPLETE CBC W/AUTO DIFF WBC: CPT | Performed by: INTERNAL MEDICINE

## 2023-07-10 PROCEDURE — 3008F PR BODY MASS INDEX (BMI) DOCUMENTED: ICD-10-PCS | Mod: CPTII,S$GLB,, | Performed by: INTERNAL MEDICINE

## 2023-07-10 PROCEDURE — 82652 VIT D 1 25-DIHYDROXY: CPT | Performed by: INTERNAL MEDICINE

## 2023-07-10 PROCEDURE — 3075F PR MOST RECENT SYSTOLIC BLOOD PRESS GE 130-139MM HG: ICD-10-PCS | Mod: CPTII,S$GLB,, | Performed by: INTERNAL MEDICINE

## 2023-07-10 PROCEDURE — 1160F RVW MEDS BY RX/DR IN RCRD: CPT | Mod: CPTII,S$GLB,, | Performed by: INTERNAL MEDICINE

## 2023-07-10 PROCEDURE — 82728 ASSAY OF FERRITIN: CPT | Performed by: INTERNAL MEDICINE

## 2023-07-10 PROCEDURE — 99999 PR PBB SHADOW E&M-EST. PATIENT-LVL IV: ICD-10-PCS | Mod: PBBFAC,,, | Performed by: INTERNAL MEDICINE

## 2023-07-10 PROCEDURE — 99214 PR OFFICE/OUTPT VISIT, EST, LEVL IV, 30-39 MIN: ICD-10-PCS | Mod: S$GLB,,, | Performed by: INTERNAL MEDICINE

## 2023-07-10 PROCEDURE — 3079F DIAST BP 80-89 MM HG: CPT | Mod: CPTII,S$GLB,, | Performed by: INTERNAL MEDICINE

## 2023-07-10 PROCEDURE — 80053 COMPREHEN METABOLIC PANEL: CPT | Performed by: INTERNAL MEDICINE

## 2023-07-10 PROCEDURE — 99214 OFFICE O/P EST MOD 30 MIN: CPT | Mod: S$GLB,,, | Performed by: INTERNAL MEDICINE

## 2023-07-10 PROCEDURE — 1159F MED LIST DOCD IN RCRD: CPT | Mod: CPTII,S$GLB,, | Performed by: INTERNAL MEDICINE

## 2023-07-10 PROCEDURE — 84466 ASSAY OF TRANSFERRIN: CPT | Performed by: INTERNAL MEDICINE

## 2023-07-10 PROCEDURE — 36415 COLL VENOUS BLD VENIPUNCTURE: CPT | Mod: PN | Performed by: INTERNAL MEDICINE

## 2023-07-10 PROCEDURE — 3079F PR MOST RECENT DIASTOLIC BLOOD PRESSURE 80-89 MM HG: ICD-10-PCS | Mod: CPTII,S$GLB,, | Performed by: INTERNAL MEDICINE

## 2023-07-10 PROCEDURE — 3075F SYST BP GE 130 - 139MM HG: CPT | Mod: CPTII,S$GLB,, | Performed by: INTERNAL MEDICINE

## 2023-07-10 PROCEDURE — 99999 PR PBB SHADOW E&M-EST. PATIENT-LVL IV: CPT | Mod: PBBFAC,,, | Performed by: INTERNAL MEDICINE

## 2023-07-10 RX ORDER — AMLODIPINE BESYLATE 5 MG/1
5 TABLET ORAL DAILY
Qty: 90 TABLET | Refills: 1 | Status: SHIPPED | OUTPATIENT
Start: 2023-07-10 | End: 2024-01-10 | Stop reason: SDUPTHER

## 2023-07-10 RX ORDER — CICLOPIROX 80 MG/ML
SOLUTION TOPICAL NIGHTLY
Qty: 6.6 ML | Refills: 2 | Status: SHIPPED | OUTPATIENT
Start: 2023-07-10

## 2023-07-11 LAB
ALBUMIN SERPL BCP-MCNC: 4 G/DL (ref 3.5–5.2)
ALP SERPL-CCNC: 62 U/L (ref 55–135)
ALT SERPL W/O P-5'-P-CCNC: 17 U/L (ref 10–44)
ANION GAP SERPL CALC-SCNC: 10 MMOL/L (ref 8–16)
AST SERPL-CCNC: 20 U/L (ref 10–40)
BASOPHILS # BLD AUTO: 0.06 K/UL (ref 0–0.2)
BASOPHILS NFR BLD: 1 % (ref 0–1.9)
BILIRUB SERPL-MCNC: 0.5 MG/DL (ref 0.1–1)
BUN SERPL-MCNC: 9 MG/DL (ref 6–20)
CALCIUM SERPL-MCNC: 9.2 MG/DL (ref 8.7–10.5)
CHLORIDE SERPL-SCNC: 106 MMOL/L (ref 95–110)
CO2 SERPL-SCNC: 21 MMOL/L (ref 23–29)
CREAT SERPL-MCNC: 0.9 MG/DL (ref 0.5–1.4)
DIFFERENTIAL METHOD: ABNORMAL
EOSINOPHIL # BLD AUTO: 0.1 K/UL (ref 0–0.5)
EOSINOPHIL NFR BLD: 1 % (ref 0–8)
ERYTHROCYTE [DISTWIDTH] IN BLOOD BY AUTOMATED COUNT: 13.7 % (ref 11.5–14.5)
EST. GFR  (NO RACE VARIABLE): >60 ML/MIN/1.73 M^2
FERRITIN SERPL-MCNC: 72 NG/ML (ref 20–300)
GLUCOSE SERPL-MCNC: 78 MG/DL (ref 70–110)
HCT VFR BLD AUTO: 41.6 % (ref 37–48.5)
HGB BLD-MCNC: 12.9 G/DL (ref 12–16)
IMM GRANULOCYTES # BLD AUTO: 0.02 K/UL (ref 0–0.04)
IMM GRANULOCYTES NFR BLD AUTO: 0.3 % (ref 0–0.5)
LYMPHOCYTES # BLD AUTO: 2 K/UL (ref 1–4.8)
LYMPHOCYTES NFR BLD: 34.6 % (ref 18–48)
MCH RBC QN AUTO: 28.4 PG (ref 27–31)
MCHC RBC AUTO-ENTMCNC: 31 G/DL (ref 32–36)
MCV RBC AUTO: 92 FL (ref 82–98)
MONOCYTES # BLD AUTO: 0.4 K/UL (ref 0.3–1)
MONOCYTES NFR BLD: 6.6 % (ref 4–15)
NEUTROPHILS # BLD AUTO: 3.3 K/UL (ref 1.8–7.7)
NEUTROPHILS NFR BLD: 56.5 % (ref 38–73)
NRBC BLD-RTO: 0 /100 WBC
PLATELET # BLD AUTO: 200 K/UL (ref 150–450)
PMV BLD AUTO: 12.5 FL (ref 9.2–12.9)
POTASSIUM SERPL-SCNC: 3.4 MMOL/L (ref 3.5–5.1)
PROT SERPL-MCNC: 7.7 G/DL (ref 6–8.4)
RBC # BLD AUTO: 4.54 M/UL (ref 4–5.4)
SODIUM SERPL-SCNC: 137 MMOL/L (ref 136–145)
WBC # BLD AUTO: 5.78 K/UL (ref 3.9–12.7)

## 2023-07-13 NOTE — PROGRESS NOTES
I sent pt a my chart message -  I reviewed your labs.  Your iron studies are normal.  Your kidney function and liver functions looked good.  Your potassium was just below normal at 3.4.  Rec a high potassium diet (bananas, oranges, spinach, potato, mushrooms, peas, sweet potatoes). Remind me are you taking Spironolactone 100 mg every day and has this been since December? You are not anemic.  I still await your Vitamin D level.       Dr. CONTRERAS

## 2023-07-14 LAB — 1,25(OH)2D3 SERPL-MCNC: 47 PG/ML (ref 20–79)

## 2023-07-14 NOTE — PROGRESS NOTES
HPI:  The patient is status post-lap delilah  PHYSICAL EXAM:    In NAD  Incisions c/d/i    Gallbladder, cholecystectomy:       - Chronic cholecystitis and cholelithiasis     ASSESSMENT:    The patient is doing well after surgery.     PLAN:    Follow up PRN.  Activity: No heavy lifting for 4 weeks.        Chau Thomason MD       Complex Repair And O-L Flap Text: The defect edges were debeveled with a #15 scalpel blade.  The primary defect was closed partially with a complex linear closure.  Given the location of the remaining defect, shape of the defect and the proximity to free margins an O-L flap was deemed most appropriate for complete closure of the defect.  Using a sterile surgical marker, an appropriate flap was drawn incorporating the defect and placing the expected incisions within the relaxed skin tension lines where possible. The area thus outlined was incised deep to adipose tissue with a #15 scalpel blade. The skin margins were undermined to an appropriate distance in all directions utilizing iris scissors and carried over to close the primary defect.

## 2023-07-17 NOTE — PROGRESS NOTES
I sent pt a my chart message -  I reviewed your vitamin D level which was normal at 47.  Dr. CONTRERAS

## 2023-08-14 ENCOUNTER — PATIENT MESSAGE (OUTPATIENT)
Dept: ADMINISTRATIVE | Facility: HOSPITAL | Age: 48
End: 2023-08-14
Payer: COMMERCIAL

## 2023-08-24 ENCOUNTER — PATIENT OUTREACH (OUTPATIENT)
Dept: ADMINISTRATIVE | Facility: HOSPITAL | Age: 48
End: 2023-08-24
Payer: COMMERCIAL

## 2023-08-24 NOTE — PROGRESS NOTES
Patient due for the following    Pneumococcal Vaccines (Age 0-64) (1 - PCV)    Hemoglobin A1c (Diabetic Prevention Screening)     COVID-19 Vaccine (3 - Moderna series)    Mammogram       Immunizations: reviewed and updated  Care Everywhere: triggered  Care Teams: up to date  Outreach: completed

## 2023-08-25 ENCOUNTER — HOSPITAL ENCOUNTER (OUTPATIENT)
Dept: RADIOLOGY | Facility: CLINIC | Age: 48
Discharge: HOME | End: 2023-08-25
Payer: COMMERCIAL

## 2023-08-25 DIAGNOSIS — Z12.31 OTHER SCREENING MAMMOGRAM: ICD-10-CM

## 2023-08-25 PROCEDURE — 77067 SCR MAMMO BI INCL CAD: CPT | Mod: 26,,, | Performed by: RADIOLOGY

## 2023-08-25 PROCEDURE — 77067 SCR MAMMO BI INCL CAD: CPT | Mod: TC,PO

## 2023-08-25 PROCEDURE — 77063 BREAST TOMOSYNTHESIS BI: CPT | Mod: 26,,, | Performed by: RADIOLOGY

## 2023-08-25 PROCEDURE — 77063 MAMMO DIGITAL SCREENING BILAT WITH TOMO: ICD-10-PCS | Mod: 26,,, | Performed by: RADIOLOGY

## 2023-08-25 PROCEDURE — 77067 MAMMO DIGITAL SCREENING BILAT WITH TOMO: ICD-10-PCS | Mod: 26,,, | Performed by: RADIOLOGY

## 2023-08-29 NOTE — PROGRESS NOTES
I sent pt a my chart message -  I reviewed your Mammogram -   The breasts have scattered areas of fibroglandular density.   Impression:  Left  Mass: A new Left breast 8 mm mass at the middle 3 o'clock position. Assessment: 0 - Incomplete. Diagnostic Mammogram and/or Ultrasound is recommended.   Right  There is no mammographic evidence of malignancy in the right breast.  Diagnostic mammogram with possible ultrasound (if indicated) is recommended to get a better look. Someone will call you to schedule this imaging.   Dr. CONTRERAS

## 2023-09-06 ENCOUNTER — HOSPITAL ENCOUNTER (OUTPATIENT)
Facility: HOSPITAL | Age: 48
Discharge: HOME | DRG: 605 | End: 2023-09-07
Attending: EMERGENCY MEDICINE | Admitting: INTERNAL MEDICINE
Payer: COMMERCIAL

## 2023-09-06 DIAGNOSIS — S30.1XXA ABDOMINAL HEMATOMA: ICD-10-CM

## 2023-09-06 PROBLEM — K65.1 POSTPROCEDURAL INTRAABDOMINAL ABSCESS: Status: RESOLVED | Noted: 2023-09-06 | Resolved: 2023-09-06

## 2023-09-06 PROBLEM — K65.1 POSTPROCEDURAL INTRAABDOMINAL ABSCESS: Status: ACTIVE | Noted: 2023-09-06

## 2023-09-06 PROBLEM — T81.43XA POSTPROCEDURAL INTRAABDOMINAL ABSCESS: Status: RESOLVED | Noted: 2023-09-06 | Resolved: 2023-09-06

## 2023-09-06 PROBLEM — T81.43XA POSTPROCEDURAL INTRAABDOMINAL ABSCESS: Status: ACTIVE | Noted: 2023-09-06

## 2023-09-06 LAB
ALBUMIN SERPL BCP-MCNC: 4.2 G/DL (ref 3.5–5.2)
ALP SERPL-CCNC: 56 U/L (ref 55–135)
ALT SERPL W/O P-5'-P-CCNC: 19 U/L (ref 10–44)
ANION GAP SERPL CALC-SCNC: 6 MMOL/L (ref 8–16)
AST SERPL-CCNC: 23 U/L (ref 10–40)
B-HCG UR QL: NEGATIVE
BACTERIA #/AREA URNS HPF: NEGATIVE /HPF
BASOPHILS # BLD AUTO: 0.06 K/UL (ref 0–0.2)
BASOPHILS NFR BLD: 0.7 % (ref 0–1.9)
BILIRUB SERPL-MCNC: 0.7 MG/DL (ref 0.1–1)
BILIRUB UR QL STRIP: NEGATIVE
BUN SERPL-MCNC: 12 MG/DL (ref 6–20)
CALCIUM SERPL-MCNC: 8.9 MG/DL (ref 8.7–10.5)
CHLORIDE SERPL-SCNC: 103 MMOL/L (ref 95–110)
CLARITY UR: CLEAR
CO2 SERPL-SCNC: 24 MMOL/L (ref 23–29)
COLOR UR: YELLOW
CREAT SERPL-MCNC: 0.8 MG/DL (ref 0.5–1.4)
CREAT SERPL-MCNC: 0.9 MG/DL (ref 0.5–1.4)
CTP QC/QA: YES
DIFFERENTIAL METHOD BLD: NORMAL
EOSINOPHIL # BLD AUTO: 0.1 K/UL (ref 0–0.5)
EOSINOPHIL NFR BLD: 1 % (ref 0–8)
ERYTHROCYTE [DISTWIDTH] IN BLOOD BY AUTOMATED COUNT: 13.6 % (ref 11.5–14.5)
EST. GFR  (NO RACE VARIABLE): >60 ML/MIN/1.73 M^2
GLUCOSE SERPL-MCNC: 89 MG/DL (ref 70–110)
GLUCOSE SERPL-MCNC: 89 MG/DL (ref 70–110)
GLUCOSE UR QL STRIP: NEGATIVE
HCT VFR BLD AUTO: 38.5 % (ref 37–48.5)
HGB BLD-MCNC: 12.5 G/DL (ref 12–16)
HGB UR QL STRIP: ABNORMAL
HYALINE CASTS #/AREA URNS LPF: 11 /LPF
IMM GRANULOCYTES # BLD AUTO: 0.02 K/UL (ref 0–0.04)
IMM GRANULOCYTES NFR BLD AUTO: 0.2 % (ref 0–0.5)
KETONES UR QL STRIP: NEGATIVE
LEUKOCYTE ESTERASE UR QL STRIP: ABNORMAL
LIPASE SERPL-CCNC: 27 U/L (ref 4–60)
LYMPHOCYTES # BLD AUTO: 2.2 K/UL (ref 1–4.8)
LYMPHOCYTES NFR BLD: 25 % (ref 18–48)
MCH RBC QN AUTO: 29.3 PG (ref 27–31)
MCHC RBC AUTO-ENTMCNC: 32.5 G/DL (ref 32–36)
MCV RBC AUTO: 90 FL (ref 82–98)
MICROSCOPIC COMMENT: ABNORMAL
MONOCYTES # BLD AUTO: 0.7 K/UL (ref 0.3–1)
MONOCYTES NFR BLD: 8.1 % (ref 4–15)
NEUTROPHILS # BLD AUTO: 5.6 K/UL (ref 1.8–7.7)
NEUTROPHILS NFR BLD: 65 % (ref 38–73)
NITRITE UR QL STRIP: NEGATIVE
NRBC BLD-RTO: 0 /100 WBC
PH UR STRIP: 7 [PH] (ref 5–8)
PLATELET # BLD AUTO: 264 K/UL (ref 150–450)
PMV BLD AUTO: 10.6 FL (ref 9.2–12.9)
POTASSIUM SERPL-SCNC: 3.5 MMOL/L (ref 3.5–5.1)
PROT SERPL-MCNC: 8.2 G/DL (ref 6–8.4)
PROT UR QL STRIP: ABNORMAL
RBC # BLD AUTO: 4.26 M/UL (ref 4–5.4)
RBC #/AREA URNS HPF: 7 /HPF (ref 0–4)
SAMPLE: NORMAL
SODIUM SERPL-SCNC: 133 MMOL/L (ref 136–145)
SP GR UR STRIP: 1.02 (ref 1–1.03)
SQUAMOUS #/AREA URNS HPF: 12 /HPF
URN SPEC COLLECT METH UR: ABNORMAL
UROBILINOGEN UR STRIP-ACNC: ABNORMAL EU/DL
WBC # BLD AUTO: 8.64 K/UL (ref 3.9–12.7)
WBC #/AREA URNS HPF: 6 /HPF (ref 0–5)

## 2023-09-06 PROCEDURE — 96374 THER/PROPH/DIAG INJ IV PUSH: CPT

## 2023-09-06 PROCEDURE — 25000003 PHARM REV CODE 250: Performed by: EMERGENCY MEDICINE

## 2023-09-06 PROCEDURE — 63600175 PHARM REV CODE 636 W HCPCS: Mod: JZ | Performed by: EMERGENCY MEDICINE

## 2023-09-06 PROCEDURE — 82565 ASSAY OF CREATININE: CPT | Mod: 59

## 2023-09-06 PROCEDURE — 63600175 PHARM REV CODE 636 W HCPCS: Performed by: EMERGENCY MEDICINE

## 2023-09-06 PROCEDURE — 99285 EMERGENCY DEPT VISIT HI MDM: CPT | Mod: 25

## 2023-09-06 PROCEDURE — 83690 ASSAY OF LIPASE: CPT

## 2023-09-06 PROCEDURE — 96365 THER/PROPH/DIAG IV INF INIT: CPT

## 2023-09-06 PROCEDURE — 80053 COMPREHEN METABOLIC PANEL: CPT

## 2023-09-06 PROCEDURE — 81001 URINALYSIS AUTO W/SCOPE: CPT

## 2023-09-06 PROCEDURE — 82962 GLUCOSE BLOOD TEST: CPT

## 2023-09-06 PROCEDURE — 25500020 PHARM REV CODE 255

## 2023-09-06 PROCEDURE — 81025 URINE PREGNANCY TEST: CPT

## 2023-09-06 PROCEDURE — G0378 HOSPITAL OBSERVATION PER HR: HCPCS

## 2023-09-06 PROCEDURE — 85025 COMPLETE CBC W/AUTO DIFF WBC: CPT

## 2023-09-06 PROCEDURE — 12000002 HC ACUTE/MED SURGE SEMI-PRIVATE ROOM

## 2023-09-06 RX ORDER — ACETAMINOPHEN 500 MG
1000 TABLET ORAL
Status: COMPLETED | OUTPATIENT
Start: 2023-09-06 | End: 2023-09-06

## 2023-09-06 RX ORDER — LEVOFLOXACIN 5 MG/ML
500 INJECTION, SOLUTION INTRAVENOUS
Status: COMPLETED | OUTPATIENT
Start: 2023-09-06 | End: 2023-09-07

## 2023-09-06 RX ORDER — METRONIDAZOLE 500 MG/100ML
500 INJECTION, SOLUTION INTRAVENOUS
Status: COMPLETED | OUTPATIENT
Start: 2023-09-06 | End: 2023-09-07

## 2023-09-06 RX ADMIN — ACETAMINOPHEN 1000 MG: 500 TABLET ORAL at 11:09

## 2023-09-06 RX ADMIN — METRONIDAZOLE 500 MG: 5 INJECTION, SOLUTION INTRAVENOUS at 11:09

## 2023-09-06 RX ADMIN — IOHEXOL 100 ML: 350 INJECTION, SOLUTION INTRAVENOUS at 08:09

## 2023-09-07 VITALS
HEIGHT: 67 IN | TEMPERATURE: 98 F | HEART RATE: 78 BPM | DIASTOLIC BLOOD PRESSURE: 68 MMHG | BODY MASS INDEX: 31.35 KG/M2 | RESPIRATION RATE: 18 BRPM | WEIGHT: 199.75 LBS | SYSTOLIC BLOOD PRESSURE: 127 MMHG | OXYGEN SATURATION: 99 %

## 2023-09-07 LAB
ANION GAP SERPL CALC-SCNC: 3 MMOL/L (ref 8–16)
BASOPHILS # BLD AUTO: 0.04 K/UL (ref 0–0.2)
BASOPHILS NFR BLD: 0.5 % (ref 0–1.9)
BUN SERPL-MCNC: 8 MG/DL (ref 6–20)
CALCIUM SERPL-MCNC: 8.7 MG/DL (ref 8.7–10.5)
CHLORIDE SERPL-SCNC: 107 MMOL/L (ref 95–110)
CO2 SERPL-SCNC: 25 MMOL/L (ref 23–29)
CREAT SERPL-MCNC: 0.6 MG/DL (ref 0.5–1.4)
DIFFERENTIAL METHOD BLD: ABNORMAL
EOSINOPHIL # BLD AUTO: 0 K/UL (ref 0–0.5)
EOSINOPHIL NFR BLD: 0.1 % (ref 0–8)
ERYTHROCYTE [DISTWIDTH] IN BLOOD BY AUTOMATED COUNT: 13.5 % (ref 11.5–14.5)
EST. GFR  (NO RACE VARIABLE): >60 ML/MIN/1.73 M^2
GLUCOSE SERPL-MCNC: 107 MG/DL (ref 70–110)
HCT VFR BLD AUTO: 36.6 % (ref 37–48.5)
HGB BLD-MCNC: 11.7 G/DL (ref 12–16)
IMM GRANULOCYTES # BLD AUTO: 0.02 K/UL (ref 0–0.04)
IMM GRANULOCYTES NFR BLD AUTO: 0.3 % (ref 0–0.5)
LYMPHOCYTES # BLD AUTO: 1.1 K/UL (ref 1–4.8)
LYMPHOCYTES NFR BLD: 14.3 % (ref 18–48)
MCH RBC QN AUTO: 28.9 PG (ref 27–31)
MCHC RBC AUTO-ENTMCNC: 32 G/DL (ref 32–36)
MCV RBC AUTO: 90 FL (ref 82–98)
MONOCYTES # BLD AUTO: 0.7 K/UL (ref 0.3–1)
MONOCYTES NFR BLD: 8.7 % (ref 4–15)
NEUTROPHILS # BLD AUTO: 5.7 K/UL (ref 1.8–7.7)
NEUTROPHILS NFR BLD: 76.1 % (ref 38–73)
NRBC BLD-RTO: 0 /100 WBC
PLATELET # BLD AUTO: 246 K/UL (ref 150–450)
PMV BLD AUTO: 10.7 FL (ref 9.2–12.9)
POTASSIUM SERPL-SCNC: 3.5 MMOL/L (ref 3.5–5.1)
RBC # BLD AUTO: 4.05 M/UL (ref 4–5.4)
SODIUM SERPL-SCNC: 135 MMOL/L (ref 136–145)
WBC # BLD AUTO: 7.49 K/UL (ref 3.9–12.7)

## 2023-09-07 PROCEDURE — 25000003 PHARM REV CODE 250: Performed by: INTERNAL MEDICINE

## 2023-09-07 PROCEDURE — 12000002 HC ACUTE/MED SURGE SEMI-PRIVATE ROOM

## 2023-09-07 PROCEDURE — 94640 AIRWAY INHALATION TREATMENT: CPT | Mod: XB

## 2023-09-07 PROCEDURE — G0378 HOSPITAL OBSERVATION PER HR: HCPCS

## 2023-09-07 PROCEDURE — 63600175 PHARM REV CODE 636 W HCPCS: Performed by: INTERNAL MEDICINE

## 2023-09-07 PROCEDURE — 25000003 PHARM REV CODE 250: Performed by: RADIOLOGY

## 2023-09-07 PROCEDURE — 36415 COLL VENOUS BLD VENIPUNCTURE: CPT | Performed by: INTERNAL MEDICINE

## 2023-09-07 PROCEDURE — 25000242 PHARM REV CODE 250 ALT 637 W/ HCPCS: Performed by: INTERNAL MEDICINE

## 2023-09-07 PROCEDURE — 63600175 PHARM REV CODE 636 W HCPCS: Performed by: EMERGENCY MEDICINE

## 2023-09-07 PROCEDURE — 85025 COMPLETE CBC W/AUTO DIFF WBC: CPT | Performed by: INTERNAL MEDICINE

## 2023-09-07 PROCEDURE — 94640 AIRWAY INHALATION TREATMENT: CPT

## 2023-09-07 PROCEDURE — 87075 CULTR BACTERIA EXCEPT BLOOD: CPT | Performed by: RADIOLOGY

## 2023-09-07 PROCEDURE — 96367 TX/PROPH/DG ADDL SEQ IV INF: CPT

## 2023-09-07 PROCEDURE — 80048 BASIC METABOLIC PNL TOTAL CA: CPT | Performed by: INTERNAL MEDICINE

## 2023-09-07 PROCEDURE — 25000003 PHARM REV CODE 250: Performed by: STUDENT IN AN ORGANIZED HEALTH CARE EDUCATION/TRAINING PROGRAM

## 2023-09-07 PROCEDURE — 87205 SMEAR GRAM STAIN: CPT | Performed by: RADIOLOGY

## 2023-09-07 PROCEDURE — 87070 CULTURE OTHR SPECIMN AEROBIC: CPT | Performed by: RADIOLOGY

## 2023-09-07 PROCEDURE — 96375 TX/PRO/DX INJ NEW DRUG ADDON: CPT

## 2023-09-07 PROCEDURE — 99222 1ST HOSP IP/OBS MODERATE 55: CPT | Mod: ,,, | Performed by: SURGERY

## 2023-09-07 PROCEDURE — 94761 N-INVAS EAR/PLS OXIMETRY MLT: CPT

## 2023-09-07 RX ORDER — ACETAMINOPHEN 325 MG/1
650 TABLET ORAL EVERY 8 HOURS PRN
Status: DISCONTINUED | OUTPATIENT
Start: 2023-09-07 | End: 2023-09-07

## 2023-09-07 RX ORDER — ONDANSETRON HYDROCHLORIDE 2 MG/ML
4 INJECTION, SOLUTION INTRAVENOUS EVERY 8 HOURS PRN
Status: DISCONTINUED | OUTPATIENT
Start: 2023-09-07 | End: 2023-09-07 | Stop reason: HOSPADM

## 2023-09-07 RX ORDER — CIPROFLOXACIN 500 MG/1
500 TABLET ORAL EVERY 12 HOURS
Qty: 10 TABLET | Refills: 0 | Status: SHIPPED | OUTPATIENT
Start: 2023-09-07 | End: 2023-09-15

## 2023-09-07 RX ORDER — ALBUTEROL SULFATE 0.83 MG/ML
2.5 SOLUTION RESPIRATORY (INHALATION) EVERY 6 HOURS
Status: DISCONTINUED | OUTPATIENT
Start: 2023-09-07 | End: 2023-09-07 | Stop reason: HOSPADM

## 2023-09-07 RX ORDER — MORPHINE SULFATE 4 MG/ML
4 INJECTION, SOLUTION INTRAMUSCULAR; INTRAVENOUS EVERY 4 HOURS PRN
Status: DISCONTINUED | OUTPATIENT
Start: 2023-09-07 | End: 2023-09-07 | Stop reason: HOSPADM

## 2023-09-07 RX ORDER — PANTOPRAZOLE SODIUM 40 MG/1
40 TABLET, DELAYED RELEASE ORAL
Status: DISCONTINUED | OUTPATIENT
Start: 2023-09-07 | End: 2023-09-07 | Stop reason: HOSPADM

## 2023-09-07 RX ORDER — PANTOPRAZOLE SODIUM 20 MG/1
20 TABLET, DELAYED RELEASE ORAL
Status: ON HOLD | COMMUNITY
Start: 2023-04-10 | End: 2023-09-07

## 2023-09-07 RX ORDER — ACETAMINOPHEN 325 MG/1
650 TABLET ORAL EVERY 6 HOURS PRN
Status: DISCONTINUED | OUTPATIENT
Start: 2023-09-07 | End: 2023-09-07 | Stop reason: HOSPADM

## 2023-09-07 RX ORDER — MORPHINE SULFATE 2 MG/ML
2 INJECTION, SOLUTION INTRAMUSCULAR; INTRAVENOUS EVERY 4 HOURS PRN
Status: DISCONTINUED | OUTPATIENT
Start: 2023-09-07 | End: 2023-09-07 | Stop reason: HOSPADM

## 2023-09-07 RX ORDER — TALC
6 POWDER (GRAM) TOPICAL NIGHTLY PRN
Status: DISCONTINUED | OUTPATIENT
Start: 2023-09-07 | End: 2023-09-07 | Stop reason: HOSPADM

## 2023-09-07 RX ORDER — FLUTICASONE PROPIONATE 50 MCG
2 SPRAY, SUSPENSION (ML) NASAL DAILY
Status: DISCONTINUED | OUTPATIENT
Start: 2023-09-07 | End: 2023-09-07 | Stop reason: HOSPADM

## 2023-09-07 RX ORDER — AMLODIPINE BESYLATE 5 MG/1
5 TABLET ORAL DAILY
Status: DISCONTINUED | OUTPATIENT
Start: 2023-09-07 | End: 2023-09-07 | Stop reason: HOSPADM

## 2023-09-07 RX ORDER — ENOXAPARIN SODIUM 100 MG/ML
40 INJECTION SUBCUTANEOUS EVERY 24 HOURS
Status: DISCONTINUED | OUTPATIENT
Start: 2023-09-07 | End: 2023-09-07 | Stop reason: HOSPADM

## 2023-09-07 RX ORDER — SPIRONOLACTONE 50 MG/1
100 TABLET, FILM COATED ORAL DAILY
Status: DISCONTINUED | OUTPATIENT
Start: 2023-09-07 | End: 2023-09-07 | Stop reason: HOSPADM

## 2023-09-07 RX ORDER — LIDOCAINE HYDROCHLORIDE 10 MG/ML
INJECTION, SOLUTION EPIDURAL; INFILTRATION; INTRACAUDAL; PERINEURAL
Status: COMPLETED | OUTPATIENT
Start: 2023-09-07 | End: 2023-09-07

## 2023-09-07 RX ORDER — PANTOPRAZOLE SODIUM 40 MG/1
1 TABLET, DELAYED RELEASE ORAL DAILY
COMMUNITY
Start: 2023-08-06 | End: 2023-10-09

## 2023-09-07 RX ADMIN — ALBUTEROL SULFATE 2.5 MG: 2.5 SOLUTION RESPIRATORY (INHALATION) at 02:09

## 2023-09-07 RX ADMIN — MORPHINE SULFATE 2 MG: 2 INJECTION, SOLUTION INTRAMUSCULAR; INTRAVENOUS at 01:09

## 2023-09-07 RX ADMIN — SPIRONOLACTONE 100 MG: 50 TABLET ORAL at 08:09

## 2023-09-07 RX ADMIN — PANTOPRAZOLE SODIUM 40 MG: 40 TABLET, DELAYED RELEASE ORAL at 05:09

## 2023-09-07 RX ADMIN — AMLODIPINE BESYLATE 5 MG: 5 TABLET ORAL at 08:09

## 2023-09-07 RX ADMIN — LIDOCAINE HYDROCHLORIDE 2 ML: 10 INJECTION, SOLUTION EPIDURAL; INFILTRATION; INTRACAUDAL; PERINEURAL at 01:09

## 2023-09-07 RX ADMIN — LEVOFLOXACIN 500 MG: 5 INJECTION, SOLUTION INTRAVENOUS at 12:09

## 2023-09-07 RX ADMIN — ACETAMINOPHEN 650 MG: 325 TABLET ORAL at 05:09

## 2023-09-07 RX ADMIN — ACETAMINOPHEN 650 MG: 325 TABLET ORAL at 10:09

## 2023-09-07 RX ADMIN — FLUTICASONE PROPIONATE 100 MCG: 50 SPRAY, METERED NASAL at 08:09

## 2023-09-07 NOTE — SUBJECTIVE & OBJECTIVE
No current facility-administered medications on file prior to encounter.     Current Outpatient Medications on File Prior to Encounter   Medication Sig    albuterol (PROVENTIL) 5 mg/mL nebulizer solution Inhale 2 puffs into the lungs every 4 (four) hours as needed for Shortness of Breath.    amLODIPine (NORVASC) 5 MG tablet Take 1 tablet (5 mg total) by mouth once daily.    ciclopirox (PENLAC) 8 % Soln Apply topically nightly. Apply to affected  toenail(s) and adjacent skin once daily in combination with weekly nail trimming and periodic nail debridement. Remove with alcohol every 7 days; continue therapy until nail clearance (max duration 48wks) (Patient taking differently: Apply 1 g topically nightly. Apply to affected  toenail(s) and adjacent skin once daily in combination with weekly nail trimming and periodic nail debridement. Remove with alcohol every 7 days; continue therapy until nail clearance (max duration 48wks))    fluticasone propionate (FLONASE) 50 mcg/actuation nasal spray 2 sprays (100 mcg total) by Each Nostril route once daily.    pantoprazole (PROTONIX) 40 MG tablet Take 1 tablet by mouth once daily.    spironolactone (ALDACTONE) 100 MG tablet Take 1 tablet (100 mg total) by mouth once daily.    tiZANidine (ZANAFLEX) 4 MG tablet TAKE 1 TABLET BY MOUTH EVERY DAY AT BEDTIME AS NEEDED FOR BACK PAIN (Patient taking differently: Take 4 mg by mouth every evening. TAKE 1 TABLET BY MOUTH EVERY DAY AT BEDTIME AS NEEDED FOR BACK PAIN)    tretinoin (RETIN-A) 0.05 % cream Apply topically every evening. (Patient taking differently: Apply 1 g topically every evening.)    valACYclovir (VALTREX) 500 MG tablet TAKE 1 TABLET BY MOUTH TWICE DAILY FOR 3 DAYS AS NEEDED FOR  FLARE (Patient taking differently: Take 500 mg by mouth 2 (two) times daily. TAKE 1 TABLET BY MOUTH TWICE DAILY FOR 3 DAYS AS NEEDED FOR  FLARE)    [DISCONTINUED] diphth,pertus,acell,,tetanus (BOOSTRIX TDAP) 2.5-8-5 Lf-mcg-Lf/0.5mL Syrg injection  Inject into the muscle.    [DISCONTINUED] pantoprazole (PROTONIX) 20 MG tablet Take 20 mg by mouth before breakfast.       Review of patient's allergies indicates:  No Known Allergies    Past Medical History:   Diagnosis Date    Asthma     Gallstones     GERD (gastroesophageal reflux disease)     H. pylori infection     Hypertension     Ovarian cyst     Rotator cuff tear, right      Past Surgical History:   Procedure Laterality Date    BILATERAL TUBAL LIGATION      BREAST BIOPSY Left 2020    benign     SECTION      x 3    COLONOSCOPY N/A 2022    Procedure: COLONOSCOPY;  Surgeon: Cirilo Tate MD;  [facility]  Service: Endoscopy;  Laterality: N/A;  Constipation bowel prep    ESOPHAGOGASTRODUODENOSCOPY N/A 2022    Procedure: EGD (ESOPHAGOGASTRODUODENOSCOPY);  Surgeon: Cirilo Tate MD;  [facility]  Service: Endoscopy;  Laterality: N/A;  COVID test at [place] on -GT    ESOPHAGOGASTRODUODENOSCOPY N/A 2022    Procedure: EGD (ESOPHAGOGASTRODUODENOSCOPY);  Surgeon: Cirilo Tate MD;  [facility]  Service: Endoscopy;  Laterality: N/A;    LAPAROSCOPIC CHOLECYSTECTOMY N/A 2022    Procedure: CHOLECYSTECTOMY, LAPAROSCOPIC;  Surgeon: Chau Thomason Jr., MD;  Location: [facility] OR [address] [place] [address];  Service: General;  Laterality: N/A;    LAPAROSCOPY LYSIS OF ADHESIONS  2018    ROBOT-ASSISTED REPAIR OF INCISIONAL HERNIA USING DA LOUIE XI N/A 2022    Procedure: XI ROBOTIC REPAIR, HERNIA, INCISIONAL w/ mesh;  Surgeon: Chau Thomason Jr., MD;  Location: [facility] OR [address] [place] [address];  Service: General;  Laterality: N/A;     Family History       Problem Relation (Age of Onset)    COPD Mother    Coronary artery disease Mother    Diabetes Maternal Grandmother    Hypertension Maternal Grandmother          Tobacco Use    Smoking status: Never    Smokeless tobacco: Never   Substance and Sexual Activity    Alcohol use: Yes     Comment: Just  Friday two [place] Ice teas    Drug use: Never    Sexual activity: Yes     Partners: Male     Birth control/protection: See Surgical Hx     Comment: Bilateral to ligation     Review of Systems   Constitutional:  Negative for activity change.   Gastrointestinal:  Positive for abdominal pain.   Musculoskeletal:  Negative for arthralgias.   Hematological:  Negative for adenopathy.   Psychiatric/Behavioral:  Negative for agitation.      Objective:     Vital Signs (Most Recent):  Temp: 98.1 °F (36.7 °C) (09/07/23 1100)  Pulse: 76 (09/07/23 1100)  Resp: 18 (09/07/23 1100)  BP: 129/75 (09/07/23 1100)  SpO2: 96 % (09/07/23 1100) Vital Signs (24h Range):  Temp:  [97.9 °F (36.6 °C)-99.5 °F (37.5 °C)] 98.1 °F (36.7 °C)  Pulse:  [74-89] 76  Resp:  [17-18] 18  SpO2:  [96 %-100 %] 96 %  BP: (123-152)/(73-97) 129/75     Weight: 90.6 kg (199 lb 11.8 oz)  Body mass index is 31.28 kg/m².     Physical Exam  Vitals reviewed.   Cardiovascular:      Pulses: Normal pulses.   Pulmonary:      Effort: Pulmonary effort is normal.   Abdominal:      General: Abdomen is flat. Bowel sounds are normal. There is no distension.      Tenderness: There is no abdominal tenderness.      Hernia: No hernia is present.      Comments: Benign abdominal exam with no peritoneal findings   Neurological:      Mental Status: She is alert.            I have reviewed all pertinent lab results within the past 24 hours.  CBC:   Recent Labs   Lab 09/07/23  0555   WBC 7.49   RBC 4.05   HGB 11.7*   HCT 36.6*      MCV 90   MCH 28.9   MCHC 32.0     BMP:   Recent Labs   Lab 09/07/23  0555      *   K 3.5      CO2 25   BUN 8   CREATININE 0.6   CALCIUM 8.7       Significant Diagnostics:  CT scan reviewed.  Suspect fluid collection related to a hematoma.

## 2023-09-07 NOTE — SUBJECTIVE & OBJECTIVE
Past Medical History:   Diagnosis Date    Asthma     Gallstones     GERD (gastroesophageal reflux disease)     H. pylori infection     Hypertension     Ovarian cyst     Rotator cuff tear, right        Past Surgical History:   Procedure Laterality Date    BILATERAL TUBAL LIGATION      BREAST BIOPSY Left 2020    benign     SECTION      x 3    COLONOSCOPY N/A 2022    Procedure: COLONOSCOPY;  Surgeon: Cirilo Tate MD;  Location: [facility] ENDO (4TH FLR);  Service: Endoscopy;  Laterality: N/A;  Constipation bowel prep    ESOPHAGOGASTRODUODENOSCOPY N/A 2022    Procedure: EGD (ESOPHAGOGASTRODUODENOSCOPY);  Surgeon: Cirilo Tate MD;  Location: [facility] ENDO (4TH FLR);  Service: Endoscopy;  Laterality: N/A;  COVID test at [place] on -GT    ESOPHAGOGASTRODUODENOSCOPY N/A 2022    Procedure: EGD (ESOPHAGOGASTRODUODENOSCOPY);  Surgeon: Cirilo Tate MD;  Location: [facility] (4TH FLR);  Service: Endoscopy;  Laterality: N/A;    LAPAROSCOPIC CHOLECYSTECTOMY N/A 2022    Procedure: CHOLECYSTECTOMY, LAPAROSCOPIC;  Surgeon: Chau Thomason Jr., MD;  [facility];  Service: General;  Laterality: N/A;    LAPAROSCOPY LYSIS OF ADHESIONS  2018    ROBOT-ASSISTED REPAIR OF INCISIONAL HERNIA USING DA LOUIE XI N/A 2022    Procedure: XI ROBOTIC REPAIR, HERNIA, INCISIONAL w/ mesh;  Surgeon: Chau Thomason Jr., MD;  [facility];  Service: General;  Laterality: N/A;       Review of patient's allergies indicates:  No Known Allergies    No current facility-administered medications on file prior to encounter.     Current Outpatient Medications on File Prior to Encounter   Medication Sig    albuterol (PROVENTIL) 5 mg/mL nebulizer solution Inhale 2 puffs into the lungs.    amLODIPine (NORVASC) 5 MG tablet Take 1 tablet (5 mg total) by mouth once daily.    ciclopirox (PENLAC) 8 % Soln Apply topically nightly. Apply to affected  toenail(s) and adjacent skin once daily  in combination with weekly nail trimming and periodic nail debridement. Remove with alcohol every 7 days; continue therapy until nail clearance (max duration 48wks)    diphth,pertus,acell,,tetanus (BOOSTRIX TDAP) 2.5-8-5 Lf-mcg-Lf/0.5mL Syrg injection Inject into the muscle.    fluticasone propionate (FLONASE) 50 mcg/actuation nasal spray 2 sprays (100 mcg total) by Each Nostril route once daily.    pantoprazole (PROTONIX) 20 MG tablet Take 1 tablet (20 mg total) by mouth before breakfast. (Patient taking differently: Take 40 mg by mouth before breakfast.)    spironolactone (ALDACTONE) 100 MG tablet Take 1 tablet (100 mg total) by mouth once daily.    tiZANidine (ZANAFLEX) 4 MG tablet TAKE 1 TABLET BY MOUTH EVERY DAY AT BEDTIME AS NEEDED FOR BACK PAIN    tretinoin (RETIN-A) 0.05 % cream Apply topically every evening.    valACYclovir (VALTREX) 500 MG tablet TAKE 1 TABLET BY MOUTH TWICE DAILY FOR 3 DAYS AS NEEDED FOR  FLARE     Family History       Problem Relation (Age of Onset)    COPD Mother    Coronary artery disease Mother    Diabetes Maternal Grandmother    Hypertension Maternal Grandmother          Tobacco Use    Smoking status: Never    Smokeless tobacco: Never   Substance and Sexual Activity    Alcohol use: Yes     Comment: Just Friday two [place] [employer] teas    Drug use: Never    Sexual activity: Yes     Partners: Male     Birth control/protection: See Surgical Hx     Comment: Bilateral to ligation     Review of Systems   Constitutional: Negative.    HENT: Negative.     Eyes: Negative.    Respiratory: Negative.     Cardiovascular: Negative.    Gastrointestinal:  Positive for abdominal distention, abdominal pain and nausea.   Endocrine: Negative.    Genitourinary: Negative.    Musculoskeletal: Negative.    Skin: Negative.    Allergic/Immunologic: Negative.    Neurological: Negative.    Hematological: Negative.    All other systems reviewed and are negative.    Objective:     Vital Signs (Most Recent):  Temp:  99.5 °F (37.5 °C) (09/06/23 1808)  Pulse: 81 (09/06/23 2300)  Resp: 17 (09/06/23 2300)  BP: (!) 145/87 (09/06/23 2300)  SpO2: 100 % (09/06/23 2300) Vital Signs (24h Range):  Temp:  [99.5 °F (37.5 °C)] 99.5 °F (37.5 °C)  Pulse:  [80-89] 81  Resp:  [17-18] 17  SpO2:  [98 %-100 %] 100 %  BP: (144-152)/(84-97) 145/87     Weight: 87.5 kg (193 lb)  Body mass index is 30.23 kg/m².     Physical Exam  Vitals and nursing note reviewed.   Constitutional:       Appearance: She is well-developed.   HENT:      Head: Normocephalic and atraumatic.      Right Ear: External ear normal.      Left Ear: External ear normal.      Nose: Nose normal.   Eyes:      Conjunctiva/sclera: Conjunctivae normal.      Pupils: Pupils are equal, round, and reactive to light.   Cardiovascular:      Rate and Rhythm: Normal rate and regular rhythm.      Heart sounds: Normal heart sounds.   Pulmonary:      Effort: Pulmonary effort is normal.      Breath sounds: Normal breath sounds.   Abdominal:      General: Bowel sounds are normal.      Palpations: Abdomen is soft.      Comments: Voluntary guarding with tenderness to minimal palpation , but no peritoneal signs   Musculoskeletal:         General: Normal range of motion.      Cervical back: Normal range of motion and neck supple.   Skin:     General: Skin is warm and dry.      Capillary Refill: Capillary refill takes less than 2 seconds.   Neurological:      Mental Status: She is alert and oriented to person, place, and time.   Psychiatric:         Behavior: Behavior normal.         Thought Content: Thought content normal.         Judgment: Judgment normal.              CRANIAL NERVES     CN III, IV, VI   Pupils are equal, round, and reactive to light.       Significant Labs: All pertinent labs within the past 24 hours have been reviewed.  CBC:   Recent Labs   Lab 09/06/23  1847   WBC 8.64   HGB 12.5   HCT 38.5        CMP:   Recent Labs   Lab 09/06/23  1847   *   K 3.5      CO2 24   GLU  89   BUN 12   CREATININE 0.9   CALCIUM 8.9   PROT 8.2   ALBUMIN 4.2   BILITOT 0.7   ALKPHOS 56   AST 23   ALT 19   ANIONGAP 6*       Significant Imaging: I have reviewed all pertinent imaging results/findings within the past 24 hours.

## 2023-09-07 NOTE — CONSULTS
[facility]  General Surgery  Consult Note    Patient Name: Ilene Garber  MRN: [id number]  Code Status: Full Code  Admission Date: 9/6/2023  Hospital Length of Stay: 1 days  Attending Physician: Harjeet Laurent,Randell  Primary Care Provider: Uma Henry MD    Patient information was obtained from patient and ER records.     Inpatient consult to General Surgery  Consult performed by: Wagner Freeman MD  Consult ordered by: Chau Richardson MD        Subjective:     Principal Problem: Abdominal hematoma    History of Present Illness: 48-year-old female who was admitted to the hospital overnight secondary to abdominal pain.  Patient states she presented to the ER secondary to pain that began yesterday.  As she had a pain around her umbilicus extending to her lower abdomen.  She was concerned about recurrent hernia.  Patient had a CT scan with no acute intra-abdominal findings.  There were findings of suspected mild colitis.  There was also fluid collection in the area in which she had an umbilical hernia repair.  This was reported where pain was initially.  Today she notes she was feeling much better she has passed gas in his having bowel function.  She did have an ultrasound-guided aspiration of the fluid collection at the umbilicus just prior to me seeing her.      No current facility-administered medications on file prior to encounter.     Current Outpatient Medications on File Prior to Encounter   Medication Sig    albuterol (PROVENTIL) 5 mg/mL nebulizer solution Inhale 2 puffs into the lungs every 4 (four) hours as needed for Shortness of Breath.    amLODIPine (NORVASC) 5 MG tablet Take 1 tablet (5 mg total) by mouth once daily.    ciclopirox (PENLAC) 8 % Soln Apply topically nightly. Apply to affected  toenail(s) and adjacent skin once daily in combination with weekly nail trimming and periodic nail debridement. Remove with alcohol every 7 days; continue therapy until nail  clearance (max duration 48wks) (Patient taking differently: Apply 1 g topically nightly. Apply to affected  toenail(s) and adjacent skin once daily in combination with weekly nail trimming and periodic nail debridement. Remove with alcohol every 7 days; continue therapy until nail clearance (max duration 48wks))    fluticasone propionate (FLONASE) 50 mcg/actuation nasal spray 2 sprays (100 mcg total) by Each Nostril route once daily.    pantoprazole (PROTONIX) 40 MG tablet Take 1 tablet by mouth once daily.    spironolactone (ALDACTONE) 100 MG tablet Take 1 tablet (100 mg total) by mouth once daily.    tiZANidine (ZANAFLEX) 4 MG tablet TAKE 1 TABLET BY MOUTH EVERY DAY AT BEDTIME AS NEEDED FOR BACK PAIN (Patient taking differently: Take 4 mg by mouth every evening. TAKE 1 TABLET BY MOUTH EVERY DAY AT BEDTIME AS NEEDED FOR BACK PAIN)    tretinoin (RETIN-A) 0.05 % cream Apply topically every evening. (Patient taking differently: Apply 1 g topically every evening.)    valACYclovir (VALTREX) 500 MG tablet TAKE 1 TABLET BY MOUTH TWICE DAILY FOR 3 DAYS AS NEEDED FOR  FLARE (Patient taking differently: Take 500 mg by mouth 2 (two) times daily. TAKE 1 TABLET BY MOUTH TWICE DAILY FOR 3 DAYS AS NEEDED FOR  FLARE)    [DISCONTINUED] diphth,pertus,acell,,tetanus (BOOSTRIX TDAP) 2.5-8-5 Lf-mcg-Lf/0.5mL Syrg injection Inject into the muscle.    [DISCONTINUED] pantoprazole (PROTONIX) 20 MG tablet Take 20 mg by mouth before breakfast.       Review of patient's allergies indicates:  No Known Allergies    Past Medical History:   Diagnosis Date    Asthma     Gallstones     GERD (gastroesophageal reflux disease)     H. pylori infection     Hypertension     Ovarian cyst     Rotator cuff tear, right      Past Surgical History:   Procedure Laterality Date    BILATERAL TUBAL LIGATION      BREAST BIOPSY Left     benign     SECTION      x 3    COLONOSCOPY N/A 2022    Procedure: COLONOSCOPY;  Surgeon: Cirilo  CAROL Tate MD;  Location: [facility] ENDO (4TH FLR);  Service: Endoscopy;  Laterality: N/A;  Constipation bowel prep    ESOPHAGOGASTRODUODENOSCOPY N/A 01/19/2022    Procedure: EGD (ESOPHAGOGASTRODUODENOSCOPY);  Surgeon: Cirilo Tate MD;  Location: [facility] ENDO (4TH FLR);  Service: Endoscopy;  Laterality: N/A;  COVID test at [place] on 1/16-GT    ESOPHAGOGASTRODUODENOSCOPY N/A 7/12/2022    Procedure: EGD (ESOPHAGOGASTRODUODENOSCOPY);  Surgeon: Cirilo Tate MD;  Location: [facility] ENDO (4TH FLR);  Service: Endoscopy;  Laterality: N/A;    LAPAROSCOPIC CHOLECYSTECTOMY N/A 02/07/2022    Procedure: CHOLECYSTECTOMY, LAPAROSCOPIC;  Surgeon: Chau Thomason Jr., MD;  Location: [facility] OR 2ND FLR;  Service: General;  Laterality: N/A;    LAPAROSCOPY LYSIS OF ADHESIONS  11/28/2018    ROBOT-ASSISTED REPAIR OF INCISIONAL HERNIA USING DA LOUIE XI N/A 9/22/2022    Procedure: XI ROBOTIC REPAIR, HERNIA, INCISIONAL w/ mesh;  Surgeon: Chau Thomason Jr., MD;  Location: [facility] OR 2ND FLR;  Service: General;  Laterality: N/A;     Family History       Problem Relation (Age of Onset)    COPD Mother    Coronary artery disease Mother    Diabetes Maternal Grandmother    Hypertension Maternal Grandmother          Tobacco Use    Smoking status: Never    Smokeless tobacco: Never   Substance and Sexual Activity    Alcohol use: Yes     Comment: Just Friday two [place] [employer] teas    Drug use: Never    Sexual activity: Yes     Partners: Male     Birth control/protection: See Surgical Hx     Comment: Bilateral to ligation     Review of Systems   Constitutional:  Negative for activity change.   Gastrointestinal:  Positive for abdominal pain.   Musculoskeletal:  Negative for arthralgias.   Hematological:  Negative for adenopathy.   Psychiatric/Behavioral:  Negative for agitation.      Objective:     Vital Signs (Most Recent):  Temp: 98.1 °F (36.7 °C) (09/07/23 1100)  Pulse: 76 (09/07/23 1100)  Resp: 18 (09/07/23 1100)  BP: 129/75  (09/07/23 1100)  SpO2: 96 % (09/07/23 1100) Vital Signs (24h Range):  Temp:  [97.9 °F (36.6 °C)-99.5 °F (37.5 °C)] 98.1 °F (36.7 °C)  Pulse:  [74-89] 76  Resp:  [17-18] 18  SpO2:  [96 %-100 %] 96 %  BP: (123-152)/(73-97) 129/75     Weight: 90.6 kg (199 lb 11.8 oz)  Body mass index is 31.28 kg/m².     Physical Exam  Vitals reviewed.   Cardiovascular:      Pulses: Normal pulses.   Pulmonary:      Effort: Pulmonary effort is normal.   Abdominal:      General: Abdomen is flat. Bowel sounds are normal. There is no distension.      Tenderness: There is no abdominal tenderness.      Hernia: No hernia is present.      Comments: Benign abdominal exam with no peritoneal findings   Neurological:      Mental Status: She is alert.            I have reviewed all pertinent lab results within the past 24 hours.  CBC:   Recent Labs   Lab 09/07/23  0555   WBC 7.49   RBC 4.05   HGB 11.7*   HCT 36.6*      MCV 90   MCH 28.9   MCHC 32.0     BMP:   Recent Labs   Lab 09/07/23  0555      *   K 3.5      CO2 25   BUN 8   CREATININE 0.6   CALCIUM 8.7       Significant Diagnostics:  CT scan reviewed.  Suspect fluid collection related to a hematoma.      Assessment/Plan:     * Abdominal hematoma  Discussed with patient.  Clinically appears to be feeling much better.  Benign abdominal exam.  Okay to start diet and DC home when cleared by hospitalist.      VTE Risk Mitigation (From admission, onward)         Ordered     enoxaparin injection 40 mg  Daily         09/07/23 0158     IP VTE HIGH RISK PATIENT  Once         09/07/23 0158     Place sequential compression device  Until discontinued         09/07/23 0158                Thank you for your consult. I will sign off. Please contact us if you have any additional questions.    Wagner Freeman MD  General Surgery  [facility]

## 2023-09-07 NOTE — PLAN OF CARE
[facility]  Initial Discharge Assessment       Primary Care Provider: Uma Henry MD    Admission Diagnosis: Abdominal hematoma [S30.1XXA]    Admission Date: 9/6/2023  Expected Discharge Date: 9/9/2023    Assessment completed with patient at bedside. Verified and confirmed information on facesheet as correct. Pt lives at listed address with , Lawrence and 11 year old daughter. Reports she has help if needed from , Lawrence.  PCP is Uma Henry MD and reports last apt was in June. Pharmacy is [facility] on [address] [facility]. Denies HH,htp/outpatient services. Not in use of any DME. Reports being independent with activities. Drives herself to apts. Reports Lawrence will provide transportation home upon DC. Reports taking home medications as prescribed and can currently afford them. Verified insurance on file. Denies recent inpt stay in last 30 days.  DC plan is back home with , Lawrence and 11 year old daughter     Transition of Care Barriers: None    Payor: [employer] / Plan: [facility] SELECT PLUS / Product Type: Commercial /     Extended Emergency Contact Information  Primary Emergency Contact: Lawrence Garber  Mobile Phone: [phone]  Relation: Spouse  Preferred language: English   needed? No    Discharge Plan A: Home with family  Discharge Plan B: Home with family      [facility] - [place], [place] - [address]  [address] [facility] [address]  Phone: [phone] Fax: [phone]    [facility] [place], [place] [facility] [address] [address]  [address] [facility] [address]  Phone: [phone] Fax: [phone]      Initial Assessment (most recent)       Adult Discharge Assessment - 09/07/23 1133          Discharge Assessment    Assessment Type Discharge Planning Assessment     Confirmed/corrected address, phone number and insurance Yes     Confirmed Demographics Correct on Facesheet     Source of  Information patient     When was your last doctors appointment? 06/06/23     Communicated MERLYN with patient/caregiver Date not available/Unable to determine     Reason For Admission Abdominal hematoma     People in Home child(alexis), dependent;spouse     Do you expect to return to your current living situation? Yes     Do you have help at home or someone to help you manage your care at home? Yes     Who are your caregiver(s) and their phone number(s)?  trupti Garber [phone]     Prior to hospitilization cognitive status: Alert/Oriented     Current cognitive status: Alert/Oriented     Equipment Currently Used at Home none     Readmission within 30 days? No     Patient currently being followed by outpatient case management? No     Do you currently have service(s) that help you manage your care at home? No     Do you take prescription medications? Yes     Do you have prescription coverage? Yes     Coverage [facility] Select Plus     Do you have any problems affording any of your prescribed medications? No     Is the patient taking medications as prescribed? yes     Who is going to help you get home at discharge? , Trupti Garber     How do you get to doctors appointments? car, drives self     Are you on dialysis? No     Do you take coumadin? No     DME Needed Upon Discharge  none     Discharge Plan discussed with: Patient     Transition of Care Barriers None     Discharge Plan A Home with family     Discharge Plan B Home with family

## 2023-09-07 NOTE — HPI
"48 year old female with history of HTN, Asthma, GERD, s/p cholecystectomy, s/p hernia repair presented to ED complaining of 4 day history of progressively worsening central (umbilical area) abdominal pain, currently 8-9/10, sharp, aching, waxing and waning, but progressively worsening, no rads. Feels nauseous, but no vomiting. Is chronically constipated, but had "Good" BM today. Had mesh placed approximately 1 year ago for hernia.    In ED: labs reviewed and noted fully below: CBC normal; trivial hyponatremia with normal renal and hepatic function. UPT: negative.  CT Abdo/Pelvis:  "IMPRESSION:  1.  Within the anterior abdominal wall just deep to the umbilicus, there is a fluid collection interposed between the right and left rectus abdominis, abutting the external margin of the peritoneum, measuring approximately 4.4 x 2.0 x 2.6 cm, with mild peripheral enhancement. This could potentially represent hematoma or abscess.  2.  Mild wall thickening along the descending colon, which could in part be artifactual related to incomplete distention, though colitis is not excluded.  3.  Moderate feces within the ascending and transverse colon, and constipation is not excluded.  4.  Mild free fluid within the pelvis and minimally within the right paracolic gutter, of uncertain etiology, possibly in part physiologic fluid.  5.  Other findings as noted."    Discussed with ED MD, who had discussed with General Surgery: npo p midnight; received a dose of levofloxacin in ED; AM labs for review   "

## 2023-09-07 NOTE — PLAN OF CARE
Pt tolerated abdominal fluid aspiration well performed by dr monahan ,back to room via wheel chair with transport, no co pain no acute distress noted , report called to asha ptBlaynes nurse on [address] verbalized understanding.

## 2023-09-07 NOTE — HPI
48-year-old female who was admitted to the hospital overnight secondary to abdominal pain.  Patient states she presented to the ER secondary to pain that began yesterday.  As she had a pain around her umbilicus extending to her lower abdomen.  She was concerned about recurrent hernia.  Patient had a CT scan with no acute intra-abdominal findings.  There were findings of suspected mild colitis.  There was also fluid collection in the area in which she had an umbilical hernia repair.  This was reported where pain was initially.  Today she notes she was feeling much better she has passed gas in his having bowel function.  She did have an ultrasound-guided aspiration of the fluid collection at the umbilicus just prior to me seeing her.

## 2023-09-07 NOTE — ED PROVIDER NOTES
"Encounter Date: 2023       History     Chief Complaint   Patient presents with    Abdominal Pain     Pt states " my stomach is killing me" c/o lower abd pain, pt states " I had a hernia repair about a year and half ago"" it feels like it's sticking out again" symptoms since Monday night      48-year-old female with 3 days of diffuse abdominal pain.  Patient had previous hernia repair.  Patient feels like there is some swelling in the lower abdomen in the mid lower abdomen region.  Denies  chills or nausea vomiting or chest pain or shortness of breath.  Denies any focal weakness or numbness.  Patient describes low-grade fever      Review of patient's allergies indicates:  No Known Allergies  Past Medical History:   Diagnosis Date    Asthma     Gallstones     GERD (gastroesophageal reflux disease)     H. pylori infection     Hypertension     Ovarian cyst     Rotator cuff tear, right      Past Surgical History:   Procedure Laterality Date    BILATERAL TUBAL LIGATION      BREAST BIOPSY Left     benign     SECTION      x 3    COLONOSCOPY N/A 2022    Procedure: COLONOSCOPY;  Surgeon: Cirilo Tate MD;  Location: [address]);  Service: Endoscopy;  Laterality: N/A;  Constipation bowel prep    ESOPHAGOGASTRODUODENOSCOPY N/A 2022    Procedure: EGD (ESOPHAGOGASTRODUODENOSCOPY);  Surgeon: Cirilo Tate MD;  Location: [address]);  Service: Endoscopy;  Laterality: N/A;  COVID test at [place] on -GT    ESOPHAGOGASTRODUODENOSCOPY N/A 2022    Procedure: EGD (ESOPHAGOGASTRODUODENOSCOPY);  Surgeon: Cirilo Tate MD;  Location: [address]);  Service: Endoscopy;  Laterality: N/A;    LAPAROSCOPIC CHOLECYSTECTOMY N/A 2022    Procedure: CHOLECYSTECTOMY, LAPAROSCOPIC;  Surgeon: Chau Thomason Jr., MD;  Location: [address];  Service: General;  Laterality: N/A;    LAPAROSCOPY LYSIS OF ADHESIONS  2018    ROBOT-ASSISTED REPAIR OF INCISIONAL HERNIA " USING DA LOUIE XI N/A 9/22/2022    Procedure: XI ROBOTIC REPAIR, HERNIA, INCISIONAL w/ mesh;  Surgeon: Chau Thomason Jr., MD;  Location: [facility] OR [address] [place] [address];  Service: General;  Laterality: N/A;     Family History   Problem Relation Age of Onset    COPD Mother     Coronary artery disease Mother         s/p CABG    Hypertension Maternal Grandmother     Diabetes Maternal Grandmother     Breast cancer Neg Hx     Colon cancer Neg Hx     Ovarian cancer Neg Hx     Celiac disease Neg Hx     Cirrhosis Neg Hx     Crohn's disease Neg Hx     Esophageal cancer Neg Hx     Inflammatory bowel disease Neg Hx     Liver cancer Neg Hx     Liver disease Neg Hx     Rectal cancer Neg Hx     Stomach cancer Neg Hx     Ulcerative colitis Neg Hx     Pancreatic cancer Neg Hx     Kidney cancer Neg Hx     Bladder Cancer Neg Hx     Uterine cancer Neg Hx      Social History     Tobacco Use    Smoking status: Never    Smokeless tobacco: Never   Substance Use Topics    Alcohol use: Yes     Comment: Just Friday two [place] [employer] teas    Drug use: Never     Review of Systems   Constitutional:  Positive for fever.   HENT: Negative.     Eyes: Negative.    Respiratory: Negative.     Cardiovascular: Negative.  Negative for chest pain.   Gastrointestinal:  Positive for abdominal pain.   Endocrine: Negative.    Genitourinary: Negative.    Musculoskeletal: Negative.    Skin: Negative.    Allergic/Immunologic: Negative.    Neurological: Negative.    Hematological: Negative.    Psychiatric/Behavioral: Negative.     All other systems reviewed and are negative.      Physical Exam     Initial Vitals [09/06/23 1808]   BP Pulse Resp Temp SpO2   (!) 152/97 80 18 99.5 °F (37.5 °C) 100 %      MAP       --         Physical Exam    Nursing note and vitals reviewed.  Constitutional: She appears well-developed and well-nourished.   HENT:   Head: Normocephalic and atraumatic.   Nose: Nose normal.   Mouth/Throat: Oropharynx is clear and moist.   Eyes: Conjunctivae and  EOM are normal. Pupils are equal, round, and reactive to light.   Neck: Neck supple. No thyromegaly present. No tracheal deviation present. No JVD present.   Normal range of motion.  Cardiovascular:  Normal rate, regular rhythm, normal heart sounds and intact distal pulses.           No murmur heard.  Pulmonary/Chest: Breath sounds normal. No stridor. No respiratory distress. She has no wheezes. She has no rales.   Abdominal: Abdomen is soft. Bowel sounds are normal. There is abdominal tenderness.   Diffuse abdominal tenderness and patient has an obvious scar in the suprapubic region.  No palpable hernia   Musculoskeletal:         General: No edema. Normal range of motion.      Cervical back: Normal range of motion and neck supple.     Neurological: She is alert and oriented to person, place, and time.   Skin: Skin is warm. Capillary refill takes less than 2 seconds.   Psychiatric: She has a normal mood and affect. Thought content normal.         ED Course   Procedures  Labs Reviewed   COMPREHENSIVE METABOLIC PANEL - Abnormal; Notable for the following components:       Result Value    Sodium 133 (*)     Anion Gap 6 (*)     All other components within normal limits   URINALYSIS, REFLEX TO URINE CULTURE - Abnormal; Notable for the following components:    Protein, UA Trace (*)     Occult Blood UA 1+ (*)     Urobilinogen, UA 2.0-3.0 (*)     Leukocytes, UA 3+ (*)     All other components within normal limits    Narrative:     Specimen Source->Urine   URINALYSIS MICROSCOPIC - Abnormal; Notable for the following components:    RBC, UA 7 (*)     WBC, UA 6 (*)     Hyaline Casts, UA 11 (*)     All other components within normal limits    Narrative:     Specimen Source->Urine   CBC W/ AUTO DIFFERENTIAL   LIPASE   POCT URINE PREGNANCY   POCT GLUCOSE   ISTAT CREATININE   POCT GLUCOSE MONITORING CONTINUOUS   POCT CREATININE          Imaging Results              CT Abdomen Pelvis With Contrast (Final result)  Result time  09/06/23 22:04:18      Final result by So De Santiago MD (09/06/23 22:04:18)                   Narrative:    EXAM DESCRIPTION:  CT ABDOMEN PELVIS WITH IV CONTRAST    CLINICAL HISTORY:  Abdominal pain, acute, nonlocalized.    TECHNIQUE:  Multiple axial images were obtained through the abdomen and pelvis with 100 mL Omni 350 intravenous contrast. Coronal and sagittal images were reconstructed.  All CT scans at this facility use dose modulation, iterative reconstruction, and/or weight based dosing when appropriate to reduce radiation dose to as low as reasonably achievable.    COMPARISON: Previous abdomen CT images dated 11/30/2021. The prior CT report is not available at this time.    FINDINGS:  Lung bases are clear.    ABDOMEN:    Stomach: Within normal limits.    Liver: No focal lesions. No intrahepatic ductal distention.    Gallbladder: Surgically absent.    Pancreas: Within normal limits.    Spleen: Within normal limits.    Adrenal glands: Within normal limits.    Right kidney: No hydronephrosis. No focal lesion.  Left kidney: No hydronephrosis. No focal lesion.    Vascular structures: Within normal limits.    Nodes: No lymphadenopathy by size criteria.    PELVIS:    Small bowel: No significant distention.    Appendix: Cannot be reliably identified.    Colon: Moderate feces within the ascending and transverse colon, and constipation is not excluded. There is mild wall thickening along the descending colon, which could in part be artifactual related to incomplete distention, though colitis is not excluded.    Bladder: Unremarkable.    Simple appearing left adnexal cyst measuring approximately 2.7 x 2.5 x 2.3 cm, for which no follow-up imaging is recommended.    Mild free fluid within the pelvis and minimally within the right paracolic gutter, of uncertain etiology, possibly in part physiologic fluid.  No free intraperitoneal air.  Within the anterior abdominal wall just deep to the umbilicus, there is a fluid  collection interposed between the right and left rectus abdominis, abutting the external margin of the peritoneum, measuring approximately 4.4 x 2.0 x 2.6 cm, with mild peripheral enhancement. The Hounsfield units are slightly greater than expected for simple fluid. This could potentially represent hematoma or abscess. No air within the fluid collection or adjacent soft tissues. Minimal adjacent soft tissue stranding.    Bones: No acute bone findings.    IMPRESSION:  1.  Within the anterior abdominal wall just deep to the umbilicus, there is a fluid collection interposed between the right and left rectus abdominis, abutting the external margin of the peritoneum, measuring approximately 4.4 x 2.0 x 2.6 cm, with mild peripheral enhancement. This could potentially represent hematoma or abscess.  2.  Mild wall thickening along the descending colon, which could in part be artifactual related to incomplete distention, though colitis is not excluded.  3.  Moderate feces within the ascending and transverse colon, and constipation is not excluded.  4.  Mild free fluid within the pelvis and minimally within the right paracolic gutter, of uncertain etiology, possibly in part physiologic fluid.  5.  Other findings as noted.    Electronically signed by:  So De Santiago MD  9/6/2023 10:04 PM CDT Workstation: [id number]                                     Medications   levoFLOXacin 500 mg/100 mL IVPB 500 mg (has no administration in time range)   metronidazole IVPB 500 mg (500 mg Intravenous New Bag 9/6/23 2313)   iohexoL (OMNIPAQUE 350) injection 100 mL (100 mLs Intravenous Given 9/6/23 2047)   acetaminophen tablet 1,000 mg (1,000 mg Oral Given 9/6/23 2313)     Medical Decision Making  48-year-old female presented emergency department with diffuse abdominal pain and concern for hernia.  CT scan shows evidence of colitis and also a fluid collection in the suprapubic region which could be an abscess versus seroma versus  hematoma.  Broad-spectrum antibiotics started and case discussed with Hospital Medicine and surgery and admission for further evaluation and treatment    Amount and/or Complexity of Data Reviewed  Labs: ordered. Decision-making details documented in ED Course.  Radiology: ordered and independent interpretation performed. Decision-making details documented in ED Course.    Risk  Prescription drug management.                               Clinical Impression:   Final diagnoses:  [S30.1XXA] Abdominal hematoma        ED Disposition Condition    Admit                 Maribel Richard MD  09/06/23 5143

## 2023-09-07 NOTE — H&P
"Count includes the [facility] - Emergency Dept  Hospital Medicine  History & Physical    Patient Name: Ilene Garber  MRN: [id number]  Patient Class: IP- Inpatient  Admission Date: 9/6/2023  Attending Physician: Chau Richardson MD  Primary Care Provider: Uma Henry MD         Patient information was obtained from patient, spouse/SO, past medical records, ER records and ED MD .     Subjective:     Principal Problem:Abdominal hematoma    Chief Complaint:   Chief Complaint   Patient presents with    Abdominal Pain     Pt states " my stomach is killing me" c/o lower abd pain, pt states " I had a hernia repair about a year and half ago"" it feels like it's sticking out again" symptoms since Monday night         HPI: 48 year old female with history of HTN, Asthma, GERD, s/p cholecystectomy, s/p hernia repair presented to ED complaining of 4 day history of progressively worsening central (umbilical area) abdominal pain, currently 8-9/10, sharp, aching, waxing and waning, but progressively worsening, no rads. Feels nauseous, but no vomiting. Is chronically constipated, but had "Good" BM today. Had mesh placed approximately 1 year ago for hernia.    In ED: labs reviewed and noted fully below: CBC normal; trivial hyponatremia with normal renal and hepatic function. UPT: negative.  CT Abdo/Pelvis:  "IMPRESSION:  1.  Within the anterior abdominal wall just deep to the umbilicus, there is a fluid collection interposed between the right and left rectus abdominis, abutting the external margin of the peritoneum, measuring approximately 4.4 x 2.0 x 2.6 cm, with mild peripheral enhancement. This could potentially represent hematoma or abscess.  2.  Mild wall thickening along the descending colon, which could in part be artifactual related to incomplete distention, though colitis is not excluded.  3.  Moderate feces within the ascending and transverse colon, and constipation is not excluded.  4.  Mild free fluid within the " "pelvis and minimally within the right paracolic gutter, of uncertain etiology, possibly in part physiologic fluid.  5.  Other findings as noted."    Discussed with ED MD, who had discussed with General Surgery: npo p midnight; received a dose of levofloxacin in ED; AM labs for review   IR consulted for drainage  Past Medical History:   Diagnosis Date    Asthma     Gallstones     GERD (gastroesophageal reflux disease)     H. pylori infection     Hypertension     Ovarian cyst     Rotator cuff tear, right        Past Surgical History:   Procedure Laterality Date    BILATERAL TUBAL LIGATION      BREAST BIOPSY Left 2020    benign     SECTION      x 3    COLONOSCOPY N/A 2022    Procedure: COLONOSCOPY;  Surgeon: Cirilo Tate MD;  Location: [facility]  Service: Endoscopy;  Laterality: N/A;  Constipation bowel prep    ESOPHAGOGASTRODUODENOSCOPY N/A 2022    Procedure: EGD (ESOPHAGOGASTRODUODENOSCOPY);  Surgeon: Cirilo Tate MD;  Location: [facility] ([address] [place] [address]);  Service: Endoscopy;  Laterality: N/A;  COVID test at [place] on -GT    ESOPHAGOGASTRODUODENOSCOPY N/A 2022    Procedure: EGD (ESOPHAGOGASTRODUODENOSCOPY);  Surgeon: Cirilo Tate MD;  Location: [facility]  Service: Endoscopy;  Laterality: N/A;    LAPAROSCOPIC CHOLECYSTECTOMY N/A 2022    Procedure: CHOLECYSTECTOMY, LAPAROSCOPIC;  Surgeon: Chau Thomason Jr., MD;  Location: [facility] OR [address] [place] [address];  Service: General;  Laterality: N/A;    LAPAROSCOPY LYSIS OF ADHESIONS  2018    ROBOT-ASSISTED REPAIR OF INCISIONAL HERNIA USING DA LOUIE XI N/A 2022    Procedure: XI ROBOTIC REPAIR, HERNIA, INCISIONAL w/ mesh;  Surgeon: Chau Thomason Jr., MD;  Location: [facility] OR [address] [place] [address];  Service: General;  Laterality: N/A;       Review of patient's allergies indicates:  No Known Allergies    No current facility-administered medications on file prior to encounter.     Current Outpatient Medications on File Prior " to Encounter   Medication Sig    albuterol (PROVENTIL) 5 mg/mL nebulizer solution Inhale 2 puffs into the lungs.    amLODIPine (NORVASC) 5 MG tablet Take 1 tablet (5 mg total) by mouth once daily.    ciclopirox (PENLAC) 8 % Soln Apply topically nightly. Apply to affected  toenail(s) and adjacent skin once daily in combination with weekly nail trimming and periodic nail debridement. Remove with alcohol every 7 days; continue therapy until nail clearance (max duration 48wks)    diphth,pertus,acell,,tetanus (BOOSTRIX TDAP) 2.5-8-5 Lf-mcg-Lf/0.5mL Syrg injection Inject into the muscle.    fluticasone propionate (FLONASE) 50 mcg/actuation nasal spray 2 sprays (100 mcg total) by Each Nostril route once daily.    pantoprazole (PROTONIX) 20 MG tablet Take 1 tablet (20 mg total) by mouth before breakfast. (Patient taking differently: Take 40 mg by mouth before breakfast.)    spironolactone (ALDACTONE) 100 MG tablet Take 1 tablet (100 mg total) by mouth once daily.    tiZANidine (ZANAFLEX) 4 MG tablet TAKE 1 TABLET BY MOUTH EVERY DAY AT BEDTIME AS NEEDED FOR BACK PAIN    tretinoin (RETIN-A) 0.05 % cream Apply topically every evening.    valACYclovir (VALTREX) 500 MG tablet TAKE 1 TABLET BY MOUTH TWICE DAILY FOR 3 DAYS AS NEEDED FOR  FLARE     Family History       Problem Relation (Age of Onset)    COPD Mother    Coronary artery disease Mother    Diabetes Maternal Grandmother    Hypertension Maternal Grandmother          Tobacco Use    Smoking status: Never    Smokeless tobacco: Never   Substance and Sexual Activity    Alcohol use: Yes     Comment: Just Friday two [place] [employer] teas    Drug use: Never    Sexual activity: Yes     Partners: Male     Birth control/protection: See Surgical Hx     Comment: Bilateral to ligation     Review of Systems   Constitutional: Negative.    HENT: Negative.     Eyes: Negative.    Respiratory: Negative.     Cardiovascular: Negative.    Gastrointestinal:  Positive for abdominal distention,  abdominal pain and nausea.   Endocrine: Negative.    Genitourinary: Negative.    Musculoskeletal: Negative.    Skin: Negative.    Allergic/Immunologic: Negative.    Neurological: Negative.    Hematological: Negative.    All other systems reviewed and are negative.    Objective:     Vital Signs (Most Recent):  Temp: 99.5 °F (37.5 °C) (09/06/23 1808)  Pulse: 81 (09/06/23 2300)  Resp: 17 (09/06/23 2300)  BP: (!) 145/87 (09/06/23 2300)  SpO2: 100 % (09/06/23 2300) Vital Signs (24h Range):  Temp:  [99.5 °F (37.5 °C)] 99.5 °F (37.5 °C)  Pulse:  [80-89] 81  Resp:  [17-18] 17  SpO2:  [98 %-100 %] 100 %  BP: (144-152)/(84-97) 145/87     Weight: 87.5 kg (193 lb)  Body mass index is 30.23 kg/m².     Physical Exam  Vitals and nursing note reviewed.   Constitutional:       Appearance: She is well-developed.   HENT:      Head: Normocephalic and atraumatic.      Right Ear: External ear normal.      Left Ear: External ear normal.      Nose: Nose normal.   Eyes:      Conjunctiva/sclera: Conjunctivae normal.      Pupils: Pupils are equal, round, and reactive to light.   Cardiovascular:      Rate and Rhythm: Normal rate and regular rhythm.      Heart sounds: Normal heart sounds.   Pulmonary:      Effort: Pulmonary effort is normal.      Breath sounds: Normal breath sounds.   Abdominal:      General: Bowel sounds are normal.      Palpations: Abdomen is soft.      Comments: Voluntary guarding with tenderness to minimal palpation , but no peritoneal signs   Musculoskeletal:         General: Normal range of motion.      Cervical back: Normal range of motion and neck supple.   Skin:     General: Skin is warm and dry.      Capillary Refill: Capillary refill takes less than 2 seconds.   Neurological:      Mental Status: She is alert and oriented to person, place, and time.   Psychiatric:         Behavior: Behavior normal.         Thought Content: Thought content normal.         Judgment: Judgment normal.              CRANIAL NERVES     CN  III, IV, VI   Pupils are equal, round, and reactive to light.       Significant Labs: All pertinent labs within the past 24 hours have been reviewed.  CBC:   Recent Labs   Lab 09/06/23  1847   WBC 8.64   HGB 12.5   HCT 38.5        CMP:   Recent Labs   Lab 09/06/23  1847   *   K 3.5      CO2 24   GLU 89   BUN 12   CREATININE 0.9   CALCIUM 8.9   PROT 8.2   ALBUMIN 4.2   BILITOT 0.7   ALKPHOS 56   AST 23   ALT 19   ANIONGAP 6*       Significant Imaging: I have reviewed all pertinent imaging results/findings within the past 24 hours.    Assessment/Plan:     * Abdominal hematoma  General Surgery: npo p midnight-except meds with sips; continue home regimen as per admission med rec listed above; received a dose of levofloxacin in ED; AM labs for review       IR consulted for drainage  VTE Risk Mitigation (From admission, onward)      None                       Chau Richardson MD  Department of Hospital Medicine  [facility] - Emergency Dept

## 2023-09-07 NOTE — ASSESSMENT & PLAN NOTE
General Surgery: npo p midnight-except meds with sips; continue home regimen as per admission med rec listed above; received a dose of levofloxacin in ED; AM labs for review

## 2023-09-07 NOTE — ASSESSMENT & PLAN NOTE
Discussed with patient.  Clinically appears to be feeling much better.  Benign abdominal exam.  Okay to start diet and DC home when cleared by hospitalist.

## 2023-09-07 NOTE — PHARMACY MED REC
"              .    Admission Medication History     The home medication history was taken by Julita Jackson.    You may go to "Admission" then "Reconcile Home Medications" tabs to review and/or act upon these items.     The home medication list has been updated by the Pharmacy department.   Please read ALL comments highlighted in yellow.   Please address this information as you see fit.    Feel free to contact us if you have any questions or require assistance.      The medications listed below were removed from the home medication list. Please reorder if appropriate:  Patient reports no longer taking the following medication(s):  Boostrix TDAP        Medications listed below were obtained from: Patient/family and Analytic software- [employer]  No current facility-administered medications on file prior to encounter.     Current Outpatient Medications on File Prior to Encounter   Medication Sig Dispense Refill    albuterol (PROVENTIL) 5 mg/mL nebulizer solution Inhale 2 puffs into the lungs every 4 (four) hours as needed for Shortness of Breath.      amLODIPine (NORVASC) 5 MG tablet Take 1 tablet (5 mg total) by mouth once daily. 90 tablet 1    ciclopirox (PENLAC) 8 % Soln Apply topically nightly. Apply to affected  toenail(s) and adjacent skin once daily in combination with weekly nail trimming and periodic nail debridement. Remove with alcohol every 7 days; continue therapy until nail clearance (max duration 48wks) (Patient taking differently: Apply 1 g topically nightly. Apply to affected  toenail(s) and adjacent skin once daily in combination with weekly nail trimming and periodic nail debridement. Remove with alcohol every 7 days; continue therapy until nail clearance (max duration 48wks)) 6.6 mL 2    fluticasone propionate (FLONASE) 50 mcg/actuation nasal spray 2 sprays (100 mcg total) by Each Nostril route once daily. 18.2 mL 8    pantoprazole (PROTONIX) 40 MG tablet Take 1 tablet by mouth once daily.      " spironolactone (ALDACTONE) 100 MG tablet Take 1 tablet (100 mg total) by mouth once daily. 30 tablet 5    tiZANidine (ZANAFLEX) 4 MG tablet TAKE 1 TABLET BY MOUTH EVERY DAY AT BEDTIME AS NEEDED FOR BACK PAIN (Patient taking differently: Take 4 mg by mouth every evening. TAKE 1 TABLET BY MOUTH EVERY DAY AT BEDTIME AS NEEDED FOR BACK PAIN) 30 tablet 0    tretinoin (RETIN-A) 0.05 % cream Apply topically every evening. (Patient taking differently: Apply 1 g topically every evening.) 45 g 5    valACYclovir (VALTREX) 500 MG tablet TAKE 1 TABLET BY MOUTH TWICE DAILY FOR 3 DAYS AS NEEDED FOR  FLARE (Patient taking differently: Take 500 mg by mouth 2 (two) times daily. TAKE 1 TABLET BY MOUTH TWICE DAILY FOR 3 DAYS AS NEEDED FOR  FLARE) 60 tablet 0    [DISCONTINUED] diphth,pertus,acell,,tetanus (BOOSTRIX TDAP) 2.5-8-5 Lf-mcg-Lf/0.5mL Syrg injection Inject into the muscle. 0.5 mL 0    [DISCONTINUED] pantoprazole (PROTONIX) 20 MG tablet Take 20 mg by mouth before breakfast.             Julita Jackson  EXT 1910

## 2023-09-08 ENCOUNTER — PATIENT OUTREACH (OUTPATIENT)
Dept: ADMINISTRATIVE | Facility: CLINIC | Age: 48
End: 2023-09-08
Payer: COMMERCIAL

## 2023-09-08 NOTE — NURSING
Discharge orders received. Discharge instructions given and explained in detail to patient, verbalized understanding of teaching. IV to Right AC discontinued with catheter intact. Patient has no complaints of pain or distress at this time. Respirations even and unlabored, skin warm and dry. Patient leaving facility via private vehicle with .

## 2023-09-08 NOTE — PROGRESS NOTES
C3 nurse spoke with Ilene Garber  for a [facility] post hospital discharge follow up call. Nurse offered to scheduled [facility] hospital follow-up appointment. The patient declined appointment at this time.

## 2023-09-10 LAB — BACTERIA SPEC ANAEROBE CULT: NORMAL

## 2023-09-11 LAB
BACTERIA FLD AEROBE CULT: NO GROWTH
GRAM STN SPEC: NORMAL
GRAM STN SPEC: NORMAL

## 2023-09-11 NOTE — PLAN OF CARE
Patient d/c on 9/7/2023.       09/11/23 1447   Final Note   Assessment Type Final Discharge Note   Anticipated Discharge Disposition Home   Post-Acute Status   Discharge Delays None known at this time

## 2023-09-12 ENCOUNTER — OFFICE VISIT (OUTPATIENT)
Dept: OBSTETRICS AND GYNECOLOGY | Facility: CLINIC | Age: 48
End: 2023-09-12
Payer: COMMERCIAL

## 2023-09-12 VITALS
DIASTOLIC BLOOD PRESSURE: 72 MMHG | WEIGHT: 195.13 LBS | SYSTOLIC BLOOD PRESSURE: 114 MMHG | RESPIRATION RATE: 16 BRPM | HEIGHT: 67 IN | BODY MASS INDEX: 30.63 KG/M2

## 2023-09-12 DIAGNOSIS — N93.9 ABNORMAL UTERINE BLEEDING (AUB): ICD-10-CM

## 2023-09-12 DIAGNOSIS — Z12.4 SCREENING FOR MALIGNANT NEOPLASM OF CERVIX: ICD-10-CM

## 2023-09-12 DIAGNOSIS — R10.2 PELVIC PAIN: Primary | ICD-10-CM

## 2023-09-12 PROCEDURE — 99999 PR PBB SHADOW E&M-EST. PATIENT-LVL III: ICD-10-PCS | Mod: PBBFAC,,, | Performed by: OBSTETRICS & GYNECOLOGY

## 2023-09-12 PROCEDURE — 3078F DIAST BP <80 MM HG: CPT | Mod: CPTII,S$GLB,, | Performed by: OBSTETRICS & GYNECOLOGY

## 2023-09-12 PROCEDURE — 88141 PR  CYTOPATH CERV/VAG INTERPRET: ICD-10-PCS | Mod: ,,, | Performed by: PATHOLOGY

## 2023-09-12 PROCEDURE — 88175 CYTOPATH C/V AUTO FLUID REDO: CPT | Performed by: PATHOLOGY

## 2023-09-12 PROCEDURE — 3078F PR MOST RECENT DIASTOLIC BLOOD PRESSURE < 80 MM HG: ICD-10-PCS | Mod: CPTII,S$GLB,, | Performed by: OBSTETRICS & GYNECOLOGY

## 2023-09-12 PROCEDURE — 1111F DSCHRG MED/CURRENT MED MERGE: CPT | Mod: CPTII,S$GLB,, | Performed by: OBSTETRICS & GYNECOLOGY

## 2023-09-12 PROCEDURE — 88141 CYTOPATH C/V INTERPRET: CPT | Mod: ,,, | Performed by: PATHOLOGY

## 2023-09-12 PROCEDURE — 99999 PR PBB SHADOW E&M-EST. PATIENT-LVL III: CPT | Mod: PBBFAC,,, | Performed by: OBSTETRICS & GYNECOLOGY

## 2023-09-12 PROCEDURE — 3008F PR BODY MASS INDEX (BMI) DOCUMENTED: ICD-10-PCS | Mod: CPTII,S$GLB,, | Performed by: OBSTETRICS & GYNECOLOGY

## 2023-09-12 PROCEDURE — 1111F PR DISCHARGE MEDS RECONCILED W/ CURRENT OUTPATIENT MED LIST: ICD-10-PCS | Mod: CPTII,S$GLB,, | Performed by: OBSTETRICS & GYNECOLOGY

## 2023-09-12 PROCEDURE — 99396 PR PREVENTIVE VISIT,EST,40-64: ICD-10-PCS | Mod: S$GLB,,, | Performed by: OBSTETRICS & GYNECOLOGY

## 2023-09-12 PROCEDURE — 3008F BODY MASS INDEX DOCD: CPT | Mod: CPTII,S$GLB,, | Performed by: OBSTETRICS & GYNECOLOGY

## 2023-09-12 PROCEDURE — 87624 HPV HI-RISK TYP POOLED RSLT: CPT | Performed by: OBSTETRICS & GYNECOLOGY

## 2023-09-12 PROCEDURE — 3074F SYST BP LT 130 MM HG: CPT | Mod: CPTII,S$GLB,, | Performed by: OBSTETRICS & GYNECOLOGY

## 2023-09-12 PROCEDURE — 3074F PR MOST RECENT SYSTOLIC BLOOD PRESSURE < 130 MM HG: ICD-10-PCS | Mod: CPTII,S$GLB,, | Performed by: OBSTETRICS & GYNECOLOGY

## 2023-09-12 PROCEDURE — 99396 PREV VISIT EST AGE 40-64: CPT | Mod: S$GLB,,, | Performed by: OBSTETRICS & GYNECOLOGY

## 2023-09-12 RX ORDER — MICONAZOLE NITRATE 2 %
1 CREAM WITH APPLICATOR VAGINAL NIGHTLY
COMMUNITY
End: 2023-09-15

## 2023-09-12 NOTE — PROGRESS NOTES
Subjective:   Chief Complaint:  No chief complaint on file.       Patient ID: Ilene Garber is a  48 y.o. female.    Contraception: BTL    Date: 2023    The patient presents today due to the following:    She has a recent hospitalization for a hernia mesh infection followed by urinary tract infection.    Since 2023 she is been having issues with pelvic pain which tends to come and go.  She reports irregular menses but no association with during her menses and the pain.  The pain is across the pelvis  She reports two menses in April no menses in May or July and two menses in August.  She denies any menopausal type issues.  No history of abnormal Pap smears but no Pap smear for greater than one year.    GYN & OB History  Patient's last menstrual period was 2023 (approximate).     Date of Last Pap: 2023  OB History          3    Para   3    Term   3            AB        Living   3         SAB        IAB        Ectopic        Multiple        Live Births   3           Obstetric Comments    x 3               Allergies: Review of patient's allergies indicates:  No Known Allergies    Past Medical History:   Diagnosis Date    Asthma     GERD (gastroesophageal reflux disease)     H. pylori infection     Hypertension     Ovarian cyst        Past Surgical History:   Procedure Laterality Date    BILATERAL TUBAL LIGATION      BREAST BIOPSY Left     benign     SECTION      x 3    CHOLECYSTECTOMY  2022    COLONOSCOPY N/A 2022    Procedure: COLONOSCOPY;  Surgeon: Cirilo Tate MD;  Location: [address]);  Service: Endoscopy;  Laterality: N/A;  Constipation bowel prep    ESOPHAGOGASTRODUODENOSCOPY N/A 2022    Procedure: EGD (ESOPHAGOGASTRODUODENOSCOPY);  Surgeon: Cirilo Tate MD;  Location: [address]);  Service: Endoscopy;  Laterality: N/A;  COVID test at [place] on -GT    ESOPHAGOGASTRODUODENOSCOPY N/A 2022     Procedure: EGD (ESOPHAGOGASTRODUODENOSCOPY);  Surgeon: Cirilo Tate MD;  Location: [facility] ENDO (4TH FLR);  Service: Endoscopy;  Laterality: N/A;    HERNIA REPAIR  9/22/2022    LAPAROSCOPIC CHOLECYSTECTOMY N/A 02/07/2022    Procedure: CHOLECYSTECTOMY, LAPAROSCOPIC;  Surgeon: Chau Thomason Jr., MD;  Location: [facility] OR 2ND FLR;  Service: General;  Laterality: N/A;    LAPAROSCOPY LYSIS OF ADHESIONS  11/28/2018    Myomectomy    ROBOT-ASSISTED REPAIR OF INCISIONAL HERNIA USING DA LOUIE XI N/A 09/22/2022    Procedure: XI ROBOTIC REPAIR, HERNIA, INCISIONAL w/ mesh;  Surgeon: Chau Thomason Jr., MD;  Location: [facility] OR 2ND FLR;  Service: General;  Laterality: N/A;    TUBAL LIGATION         Medications    Current Outpatient Medications:     albuterol (PROVENTIL) 5 mg/mL nebulizer solution, Inhale 2 puffs into the lungs every 4 (four) hours as needed for Shortness of Breath., Disp: , Rfl:     amLODIPine (NORVASC) 5 MG tablet, Take 1 tablet (5 mg total) by mouth once daily., Disp: 90 tablet, Rfl: 1    ciclopirox (PENLAC) 8 % Soln, Apply topically nightly. Apply to affected  toenail(s) and adjacent skin once daily in combination with weekly nail trimming and periodic nail debridement. Remove with alcohol every 7 days; continue therapy until nail clearance (max duration 48wks) (Patient taking differently: Apply 1 g topically nightly. Apply to affected  toenail(s) and adjacent skin once daily in combination with weekly nail trimming and periodic nail debridement. Remove with alcohol every 7 days; continue therapy until nail clearance (max duration 48wks)), Disp: 6.6 mL, Rfl: 2    fluticasone propionate (FLONASE) 50 mcg/actuation nasal spray, 2 sprays (100 mcg total) by Each Nostril route once daily., Disp: 18.2 mL, Rfl: 8    pantoprazole (PROTONIX) 40 MG tablet, Take 1 tablet by mouth once daily., Disp: , Rfl:     spironolactone (ALDACTONE) 100 MG tablet, Take 1 tablet (100 mg total) by mouth once daily., Disp: 30  tablet, Rfl: 5    tiZANidine (ZANAFLEX) 4 MG tablet, TAKE 1 TABLET BY MOUTH EVERY DAY AT BEDTIME AS NEEDED FOR BACK PAIN (Patient taking differently: Take 4 mg by mouth every evening. TAKE 1 TABLET BY MOUTH EVERY DAY AT BEDTIME AS NEEDED FOR BACK PAIN), Disp: 30 tablet, Rfl: 0    tretinoin (RETIN-A) 0.05 % cream, Apply topically every evening. (Patient taking differently: Apply 1 g topically every evening.), Disp: 45 g, Rfl: 5    valACYclovir (VALTREX) 500 MG tablet, TAKE 1 TABLET BY MOUTH TWICE DAILY FOR 3 DAYS AS NEEDED FOR  FLARE (Patient taking differently: Take 500 mg by mouth 2 (two) times daily. TAKE 1 TABLET BY MOUTH TWICE DAILY FOR 3 DAYS AS NEEDED FOR  FLARE), Disp: 60 tablet, Rfl: 0    albuterol (VENTOLIN HFA) 90 mcg/actuation inhaler, Inhale 2 puffs into the lungs every 6 (six) hours as needed for Wheezing. Rescue, Disp: 18 g, Rfl: 3     Social History     Socioeconomic History    Marital status:    Tobacco Use    Smoking status: Never     Passive exposure: Never    Smokeless tobacco: Never   Substance and Sexual Activity    Alcohol use: Not Currently     Comment: Special occasions    Drug use: Never    Sexual activity: Yes     Partners: Male     Birth control/protection: See Surgical Hx, None     Comment: Bilateral to ligation   Social History Narrative    She is     She works as a Nurse       Family History   Problem Relation Age of Onset    COPD Mother     Coronary artery disease Mother         s/p CABG    Breast cancer Sister 42    Hypertension Maternal Grandmother     Diabetes Maternal Grandmother     Colon cancer Neg Hx     Ovarian cancer Neg Hx     Celiac disease Neg Hx     Cirrhosis Neg Hx     Crohn's disease Neg Hx     Esophageal cancer Neg Hx     Inflammatory bowel disease Neg Hx     Liver cancer Neg Hx     Liver disease Neg Hx     Rectal cancer Neg Hx     Stomach cancer Neg Hx     Ulcerative colitis Neg Hx     Pancreatic cancer Neg Hx     Kidney cancer Neg Hx     Bladder Cancer  Neg Hx     Uterine cancer Neg Hx        Review of Systems (at today's evaluation)  Review of Systems   Constitutional:  Negative for fever and unexpected weight change.   Respiratory: Negative.     Cardiovascular:  Negative for chest pain and palpitations.   Gastrointestinal:  Positive for abdominal pain. Negative for constipation, diarrhea, nausea and vomiting.   Genitourinary:  Negative for dysuria, hematuria and urgency.        Gyn as per HPI   Neurological:  Negative for headaches.        Objective:      Vitals:    09/12/23 1526   BP: 114/72   Resp: 16     Physical Exam:   Constitutional: She appears well-developed and well-nourished.    HENT:   Head: Normocephalic.     Neck: No thyroid mass present.    Cardiovascular:  Normal rate.             Pulmonary/Chest: Effort normal. No respiratory distress.        Abdominal: Soft. There is no abdominal tenderness.     Genitourinary:    Inguinal canal, vagina, uterus, right adnexa and left adnexa normal.      Pelvic exam was performed with patient supine.   The external female genitalia was normal.   No external genitalia lesions identified,Cervix is normal. Right adnexum displays no mass and no tenderness. Left adnexum displays no mass and no tenderness. No tenderness or bleeding in the vagina. Uterus is not tender. Normal urethral meatus.Urethra findings: no tendernessBladder findings: no bladder tenderness          Musculoskeletal: Normal range of motion.      Right lower leg: No edema.      Left lower leg: No edema.      Lymphadenopathy:     She has no cervical adenopathy. No inguinal adenopathy noted on the right or left side.    Neurological: She is alert.    Skin: Skin is warm and dry.    Psychiatric: Mood normal.         Assessment:        1. Pelvic pain    2. Abnormal uterine bleeding (AUB)    3. Screening for malignant neoplasm of cervix        Plan:      Pelvic pain  -     US Pelvis Comp with Transvag NON-OB (xpd; Future; Expected date: 09/12/2023    Abnormal  uterine bleeding (AUB)  -     Luteinizing Hormone; Future; Expected date: 09/12/2023  -     Follicle Stimulating Hormone; Future; Expected date: 09/12/2023  -     Estradiol; Future; Expected date: 09/12/2023  -     US Pelvis Comp with Transvag NON-OB (xpd; Future; Expected date: 09/12/2023  -     CBC Auto Differential; Future; Expected date: 09/12/2023    Screening for malignant neoplasm of cervix  -     HPV High Risk Genotypes, PCR  -     Liquid-Based Pap Smear, Screening       Follow up for ASAP after recommended testing obtained, Follow-up on today's evaluation.     The above was reviewed discussed with the patient.    We discussed the various potential causes for abnormal uterine bleeding as well as pelvic pain.    At this time will proceed as follows:  A Pap smear was obtained   Hormonal laboratory evaluation as above has been ordered  A pelvic ultrasound has been ordered   We will base further evaluation treatment results of the above testing    The patient's questions were answered, and she is in agreement with the current plan.     Ubaldo Gonzalez MD  Department OBGYN [facility]

## 2023-09-14 ENCOUNTER — HOSPITAL ENCOUNTER (OUTPATIENT)
Dept: RADIOLOGY | Facility: HOSPITAL | Age: 48
Discharge: HOME | End: 2023-09-14
Attending: INTERNAL MEDICINE
Payer: COMMERCIAL

## 2023-09-14 DIAGNOSIS — R92.8 ABNORMAL MAMMOGRAM OF LEFT BREAST: ICD-10-CM

## 2023-09-14 PROCEDURE — 77061 MAMMO DIGITAL DIAGNOSTIC LEFT WITH TOMO: ICD-10-PCS | Mod: 26,LT,, | Performed by: RADIOLOGY

## 2023-09-14 PROCEDURE — 76642 ULTRASOUND BREAST LIMITED: CPT | Mod: 26,LT,, | Performed by: RADIOLOGY

## 2023-09-14 PROCEDURE — 77061 BREAST TOMOSYNTHESIS UNI: CPT | Mod: 26,LT,, | Performed by: RADIOLOGY

## 2023-09-14 PROCEDURE — 76642 ULTRASOUND BREAST LIMITED: CPT | Mod: TC,LT

## 2023-09-14 PROCEDURE — 77065 DX MAMMO INCL CAD UNI: CPT | Mod: 26,LT,, | Performed by: RADIOLOGY

## 2023-09-14 PROCEDURE — 77065 MAMMO DIGITAL DIAGNOSTIC LEFT WITH TOMO: ICD-10-PCS | Mod: 26,LT,, | Performed by: RADIOLOGY

## 2023-09-14 PROCEDURE — 76642 US BREAST LEFT LIMITED: ICD-10-PCS | Mod: 26,LT,, | Performed by: RADIOLOGY

## 2023-09-14 PROCEDURE — 77065 DX MAMMO INCL CAD UNI: CPT | Mod: TC,LT

## 2023-09-14 NOTE — PROGRESS NOTES
I sent pt a my chart message -  I reviewed your Diagnostic Left Mammogram and Left breast Ultrasound which showed -   There is an 7 mm oval, anechoic mass with circumscribed margins seen in the left breast at 4 o'clock. This is a new finding.   There is no evidence of suspicious masses or other abnormal findings in the left breast.  Impression: 7mm cyst left breast.   There is no mammographic or sonographic evidence of malignancy in the left breast.  Routine screening mammogram in 1 year is recommended.  Dr. CONTRERAS

## 2023-09-15 ENCOUNTER — OFFICE VISIT (OUTPATIENT)
Dept: PRIMARY CARE CLINIC | Facility: CLINIC | Age: 48
End: 2023-09-15
Payer: COMMERCIAL

## 2023-09-15 VITALS
SYSTOLIC BLOOD PRESSURE: 100 MMHG | WEIGHT: 196.44 LBS | HEART RATE: 74 BPM | BODY MASS INDEX: 30.83 KG/M2 | TEMPERATURE: 98 F | HEIGHT: 67 IN | DIASTOLIC BLOOD PRESSURE: 80 MMHG | RESPIRATION RATE: 16 BRPM

## 2023-09-15 DIAGNOSIS — L02.211 ABDOMINAL WALL ABSCESS: ICD-10-CM

## 2023-09-15 DIAGNOSIS — K52.9 COLITIS: ICD-10-CM

## 2023-09-15 DIAGNOSIS — N93.9 ABNORMAL UTERINE BLEEDING: ICD-10-CM

## 2023-09-15 DIAGNOSIS — I10 HYPERTENSION, UNSPECIFIED TYPE: Primary | ICD-10-CM

## 2023-09-15 DIAGNOSIS — R31.29 MICROSCOPIC HEMATURIA: ICD-10-CM

## 2023-09-15 DIAGNOSIS — E66.09 CLASS 1 OBESITY DUE TO EXCESS CALORIES WITH SERIOUS COMORBIDITY AND BODY MASS INDEX (BMI) OF 30.0 TO 30.9 IN ADULT: ICD-10-CM

## 2023-09-15 DIAGNOSIS — J45.20 MILD INTERMITTENT ASTHMA WITHOUT COMPLICATION: ICD-10-CM

## 2023-09-15 LAB
BILIRUB UR QL STRIP: NEGATIVE
CLARITY UR REFRACT.AUTO: CLEAR
COLOR UR AUTO: YELLOW
GLUCOSE UR QL STRIP: NEGATIVE
HGB UR QL STRIP: ABNORMAL
KETONES UR QL STRIP: NEGATIVE
LEUKOCYTE ESTERASE UR QL STRIP: NEGATIVE
NITRITE UR QL STRIP: NEGATIVE
PH UR STRIP: 6 [PH] (ref 5–8)
PROT UR QL STRIP: ABNORMAL
SP GR UR STRIP: 1.02 (ref 1–1.03)
URN SPEC COLLECT METH UR: ABNORMAL

## 2023-09-15 PROCEDURE — 1111F DSCHRG MED/CURRENT MED MERGE: CPT | Mod: CPTII,S$GLB,, | Performed by: INTERNAL MEDICINE

## 2023-09-15 PROCEDURE — 99214 PR OFFICE/OUTPT VISIT, EST, LEVL IV, 30-39 MIN: ICD-10-PCS | Mod: S$GLB,,, | Performed by: INTERNAL MEDICINE

## 2023-09-15 PROCEDURE — 99999 PR PBB SHADOW E&M-EST. PATIENT-LVL IV: ICD-10-PCS | Mod: PBBFAC,,, | Performed by: INTERNAL MEDICINE

## 2023-09-15 PROCEDURE — 1159F MED LIST DOCD IN RCRD: CPT | Mod: CPTII,S$GLB,, | Performed by: INTERNAL MEDICINE

## 2023-09-15 PROCEDURE — 99214 OFFICE O/P EST MOD 30 MIN: CPT | Mod: S$GLB,,, | Performed by: INTERNAL MEDICINE

## 2023-09-15 PROCEDURE — 3079F DIAST BP 80-89 MM HG: CPT | Mod: CPTII,S$GLB,, | Performed by: INTERNAL MEDICINE

## 2023-09-15 PROCEDURE — 1111F PR DISCHARGE MEDS RECONCILED W/ CURRENT OUTPATIENT MED LIST: ICD-10-PCS | Mod: CPTII,S$GLB,, | Performed by: INTERNAL MEDICINE

## 2023-09-15 PROCEDURE — 81003 URINALYSIS AUTO W/O SCOPE: CPT | Performed by: INTERNAL MEDICINE

## 2023-09-15 PROCEDURE — 3074F PR MOST RECENT SYSTOLIC BLOOD PRESSURE < 130 MM HG: ICD-10-PCS | Mod: CPTII,S$GLB,, | Performed by: INTERNAL MEDICINE

## 2023-09-15 PROCEDURE — 3008F BODY MASS INDEX DOCD: CPT | Mod: CPTII,S$GLB,, | Performed by: INTERNAL MEDICINE

## 2023-09-15 PROCEDURE — 99999 PR PBB SHADOW E&M-EST. PATIENT-LVL IV: CPT | Mod: PBBFAC,,, | Performed by: INTERNAL MEDICINE

## 2023-09-15 PROCEDURE — 3079F PR MOST RECENT DIASTOLIC BLOOD PRESSURE 80-89 MM HG: ICD-10-PCS | Mod: CPTII,S$GLB,, | Performed by: INTERNAL MEDICINE

## 2023-09-15 PROCEDURE — 1159F PR MEDICATION LIST DOCUMENTED IN MEDICAL RECORD: ICD-10-PCS | Mod: CPTII,S$GLB,, | Performed by: INTERNAL MEDICINE

## 2023-09-15 PROCEDURE — 3074F SYST BP LT 130 MM HG: CPT | Mod: CPTII,S$GLB,, | Performed by: INTERNAL MEDICINE

## 2023-09-15 PROCEDURE — 1160F RVW MEDS BY RX/DR IN RCRD: CPT | Mod: CPTII,S$GLB,, | Performed by: INTERNAL MEDICINE

## 2023-09-15 PROCEDURE — 1160F PR REVIEW ALL MEDS BY PRESCRIBER/CLIN PHARMACIST DOCUMENTED: ICD-10-PCS | Mod: CPTII,S$GLB,, | Performed by: INTERNAL MEDICINE

## 2023-09-15 PROCEDURE — 3008F PR BODY MASS INDEX (BMI) DOCUMENTED: ICD-10-PCS | Mod: CPTII,S$GLB,, | Performed by: INTERNAL MEDICINE

## 2023-09-15 RX ORDER — ALBUTEROL SULFATE 90 UG/1
2 AEROSOL, METERED RESPIRATORY (INHALATION) EVERY 6 HOURS PRN
Qty: 18 G | Refills: 3 | Status: SHIPPED | OUTPATIENT
Start: 2023-09-15 | End: 2024-09-14

## 2023-09-15 NOTE — PROGRESS NOTES
[facility] Note    Chief Complaint    No chief complaint on file.      History of Present Illness      Ilene Garber is a 48 y.o.  AAF with HTN, Hypokalemia, GERD, Mild Intermittent Asthma, Dysmnenorrhea, RT Rotator cuff tear presents to fu Hosp visit. Last virtual visit - 7/10/23    Transitional Care Note    Family and/or Caretaker present at visit?  No.  Diagnostic tests reviewed/disposition: I have reviewed all completed as well as pending diagnostic tests at the time of discharge.  Disease/illness education:    Home health/community services discussion/referrals: Patient does not have home health established from hospital visit.  They do not need home health.  If needed, we will set up home health for the patient.   Establishment or re-establishment of referral orders for community resources:  no .   Discussion with other health care providers:  Alerted her Gen Sx- dr Thomason .     Hosp -9/6/23 - 9/7/23 -  Abd pain x 3 days + Abd abscess/colitis. She was tx with flagyl,cipro,and Levaquin. Ultrasound-guided aspiration of anterior abdominal wall fluid collection as above yielding only 2 mL of serous fluid.  Peritoneal Cx - Anaerobic and aerobic both neg.  Her d/c summary was not available. This info was provided by the pt. Feeling better since completing abx.       H/o gastritis - EGD 1/19/22- H. pylori gastritis Mild active proctitis. She was tx with Quad Tx. Repeat EGD 7/12/22 - 1 cm hiatal hernia.  Pt on Protonix BID. She is being fu by GI, Dr. Tate.  Planning for upcoming appt in Oct. Avoids NSAIDs.      H/o gallstones - CT abd 11/30/21 - Nitrogen containing cholelithiasis with small volume of nonspecific pericholecystic fluid.  No evidence of wall thickening or pericholecystic fat stranding. If there is clinical concern for cholecystitis, recommend HIDA scan. Few scattered prominent mesenteric lymph nodes without evidence of pathologic lymphadenopathy.GI referred to general  "surgery for evaluation of postprandial abdominal pain in gallstone seen on CT scan.  Pt is s/p lap delilah 2/7/22 with Dr. Thomason.     Iman umbilical hernia s/p repair - 9/22/22 -Doing better.      Iron def - Pt was iron deficient but not anemic and Dr. Tate had recommend that she take ferrous gluconate one 324mg pill once daily when on her cycle. She has not been on it. Resolved. Iron studies are normal.       Vit D def -  Resolved. Vit D level which was normal at 47. Pt completed Ergo per Dr. Tate 50,000 units ONCE A WEEK (every 7-days) for 12 wks and is now on Vitamin D3 4,000 units/d OTC.     Low HDL - Her Cholesterol looks ok but her HDL (good Cholesterol) is low.  I recommend exercise to increase this.  Repeat FLP.      Hypokalemia  3.5- resolved.  Cont to monitor K+ on aldactone.      HSV genital - Pt on Valtrex prn.      HTN - We recently switched her from HCTZ to Spironolactone 25 mg/d.  She is doing well. Her potassium improved. She is now on amlodipine 5 mg/d since 9/26/22.  Derm inc her aldactone from 25 mg to 50 and then 100 mg for tx of her acne. Doing well. No dizziness.   Cont to monitor K+. She had to let 3 people go today.      GERD- Pt on Protonix daily. Fu by GI, Dr. Tate.      AUB/Dysmenorrhea - + heavy cycles and irregular cycles.  Improved s/p D&C. She takes OTC iron x 2 days around her cycle.  + Iron def. Pelvic U/S - 11/5/21 - uterine fibroid, cyst, and nabothian cysts. Fu by Ob, Dr. Gonzalez. Planning for repeat pelvic U/S.      RT shoulder pain - She was injured at work. She tore her rotator cuff.  "It comes and goes but has been ok lately". Avoid NSAIDS. She takes Tylenol prn, heating pad prn, voltaren gel prn. Xanaflex prn. she does some Ptx.      Mild intermittent Asthma - Allergies/weather change are a trigger.  Typically only needs Proair 5 times/yr.   Allergies - trigger. She has had flares with weather change. Zyrtec and flonase prn help.      Acne - Pt is on Aldactone " 100 mg/d.     Obesity - BMI - 30.77- down 7 lb since last visit. Rec exercise every other day. Rec low carb diet.     HCM - Flu - 23;  Tdap - 7/10/23;  PCV 13 - ?;  PVX 23 - ?;  Shingrix - due at 52 y.o.;  RSV - due at 62 y.o.; COVID -19 Vaccine - (Moderna) # 1 - 2/10/21 and #2 - 3/11/21;  MGM - 23 -cyst -  repeat 1 yr;  PAP - - pending;  hep C screen - 21 - neg.HIV Screen -21 -neg. ; C-scope - 22 - diverticulosis, hemorrhoids, erythema - c/w mild active proctitis- repeat 10 yrs; EGD - 22 - Moderate chronic antral and oxyntic gastritis with mild activity Prev PCP - Dr. Rory Cooper; Ob/GYN - Dr. Dey;  GI - Dr. Tate; Well visit - 23    Patient Care Team:  Uma Henry MD as PCP - General (Internal Medicine)  Anderson Dey Jr., MD as Obstetrician (Obstetrics)  Ayala Sharma MA as Care Coordinator     Health Maintenance:  Immunization History   Administered Date(s) Administered    COVID-19, MRNA, LN-S, PF (MODERNA FULL 0.5 ML DOSE) 02/10/2021, 2021    Influenza - Quadrivalent - [facility] - PF 2021, 2023    Tdap 07/10/2023      Health Maintenance   Topic Date Due    Mammogram  2024    Lipid Panel  01/10/2028    Colorectal Cancer Screening  2032    TETANUS VACCINE  07/10/2033    Hepatitis C Screening  Completed        Past Medical History:  Past Medical History:   Diagnosis Date    Asthma     GERD (gastroesophageal reflux disease)     H. pylori infection     Hypertension     Ovarian cyst        Past Surgical History:   has a past surgical history that includes  section; LAPAROSCOPY LYSIS OF ADHESIONS (2018); Bilateral tubal ligation; Esophagogastroduodenoscopy (N/A, 2022); Colonoscopy (N/A, 2022); Laparoscopic cholecystectomy (N/A, 2022); Breast biopsy (Left, ); Esophagogastroduodenoscopy (N/A, 2022); Robot-assisted repair of incisional hernia using da Vamshi Xi (N/A, 2022); Tubal  ligation; Hernia repair (9/22/2022); and Cholecystectomy (2/7/2022).    Family History:  family history includes Breast cancer (age of onset: 42) in her sister; COPD in her mother; Coronary artery disease in her mother; Diabetes in her maternal grandmother; Hypertension in her maternal grandmother.     Social History:  Social History     Tobacco Use    Smoking status: Never     Passive exposure: Never    Smokeless tobacco: Never   Substance Use Topics    Alcohol use: Not Currently     Comment: Special occasions    Drug use: Never       Review of Systems   Constitutional:  Negative for chills, diaphoresis and fever.   HENT:  Negative for hearing loss, rhinorrhea and trouble swallowing.    Eyes:  Negative for discharge and visual disturbance.        Wears glasses.    Respiratory:  Negative for cough, chest tightness, shortness of breath and wheezing.    Cardiovascular:  Negative for chest pain and palpitations.   Gastrointestinal:  Positive for constipation. Negative for abdominal pain, blood in stool, diarrhea and vomiting.   Endocrine: Negative for polydipsia and polyuria.   Genitourinary:  Positive for menstrual irregularity and menstrual problem. Negative for difficulty urinating, dysuria and hematuria.        Dysuria resolved with recent Cipro.    Musculoskeletal:  Positive for arthralgias. Negative for joint swelling.   Neurological:  Positive for headaches. Negative for weakness.   Psychiatric/Behavioral:  Negative for confusion and dysphoric mood.         Medications:    Current Outpatient Medications:     albuterol (PROVENTIL) 5 mg/mL nebulizer solution, Inhale 2 puffs into the lungs every 4 (four) hours as needed for Shortness of Breath., Disp: , Rfl:     amLODIPine (NORVASC) 5 MG tablet, Take 1 tablet (5 mg total) by mouth once daily., Disp: 90 tablet, Rfl: 1    ciclopirox (PENLAC) 8 % Soln, Apply topically nightly. Apply to affected  toenail(s) and adjacent skin once daily in combination with weekly nail  "trimming and periodic nail debridement. Remove with alcohol every 7 days; continue therapy until nail clearance (max duration 48wks) (Patient taking differently: Apply 1 g topically nightly. Apply to affected  toenail(s) and adjacent skin once daily in combination with weekly nail trimming and periodic nail debridement. Remove with alcohol every 7 days; continue therapy until nail clearance (max duration 48wks)), Disp: 6.6 mL, Rfl: 2    fluticasone propionate (FLONASE) 50 mcg/actuation nasal spray, 2 sprays (100 mcg total) by Each Nostril route once daily., Disp: 18.2 mL, Rfl: 8    pantoprazole (PROTONIX) 40 MG tablet, Take 1 tablet by mouth once daily., Disp: , Rfl:     spironolactone (ALDACTONE) 100 MG tablet, Take 1 tablet (100 mg total) by mouth once daily., Disp: 30 tablet, Rfl: 5    tiZANidine (ZANAFLEX) 4 MG tablet, TAKE 1 TABLET BY MOUTH EVERY DAY AT BEDTIME AS NEEDED FOR BACK PAIN (Patient taking differently: Take 4 mg by mouth every evening. TAKE 1 TABLET BY MOUTH EVERY DAY AT BEDTIME AS NEEDED FOR BACK PAIN), Disp: 30 tablet, Rfl: 0    tretinoin (RETIN-A) 0.05 % cream, Apply topically every evening. (Patient taking differently: Apply 1 g topically every evening.), Disp: 45 g, Rfl: 5    valACYclovir (VALTREX) 500 MG tablet, TAKE 1 TABLET BY MOUTH TWICE DAILY FOR 3 DAYS AS NEEDED FOR  FLARE (Patient taking differently: Take 500 mg by mouth 2 (two) times daily. TAKE 1 TABLET BY MOUTH TWICE DAILY FOR 3 DAYS AS NEEDED FOR  FLARE), Disp: 60 tablet, Rfl: 0    albuterol (VENTOLIN HFA) 90 mcg/actuation inhaler, Inhale 2 puffs into the lungs every 6 (six) hours as needed for Wheezing. Rescue, Disp: 18 g, Rfl: 3     Allergies:  Review of patient's allergies indicates:  No Known Allergies    Physical Exam      Vital Signs  Temp: 97.6 °F (36.4 °C)  Temp Source: Oral  Pulse: 74  Resp: 16  BP: 100/80  BP Location: Left arm  Patient Position: Sitting  Pain Score: 0-No pain  Height and Weight  Height: 5' 7" (170.2 " cm)  Weight: 89.1 kg (196 lb 6.9 oz)  BSA (Calculated - sq m): 2.05 sq meters  BMI (Calculated): 30.8  Weight in (lb) to have BMI = 25: 159.3      Patient Position: Sitting      Physical Exam  Vitals reviewed.   Constitutional:       General: She is not in acute distress.     Appearance: Normal appearance. She is not ill-appearing, toxic-appearing or diaphoretic.   HENT:      Head: Normocephalic and atraumatic.   Cardiovascular:      Rate and Rhythm: Normal rate and regular rhythm.      Pulses: Normal pulses.      Heart sounds: Normal heart sounds.   Pulmonary:      Effort: No respiratory distress.      Breath sounds: Normal breath sounds. No wheezing.   Abdominal:      General: Bowel sounds are normal. There is no distension.      Palpations: Abdomen is soft. There is mass.      Tenderness: There is abdominal tenderness. There is no guarding or rebound.          Comments: Area of tenderness and subcutaneous mass palpated just superolateral to the umbilicus on the RT. No guarding or rebound. Only TTP in epigastrum and RUQ particularly over the above site   Neurological:      General: No focal deficit present.      Mental Status: She is alert and oriented to person, place, and time.   Psychiatric:         Mood and Affect: Mood normal.         Behavior: Behavior normal.          Laboratory:  CBC:  Recent Labs   Lab 07/10/23  1535 09/06/23  1847 09/07/23  0555   WBC 5.78 8.64 7.49   RBC 4.54 4.26 4.05   Hemoglobin 12.9 12.5 11.7 L   Hematocrit 41.6 38.5 36.6 L   Platelets 200 264 246   MCV 92 90 90   MCH 28.4 29.3 28.9   MCHC 31.0 L 32.5 32.0       CMP:  Recent Labs   Lab 01/10/23  0825 07/10/23  1535 09/06/23  1847 09/07/23  0555   Glucose 87 78 89 107   Calcium 9.4 9.2 8.9 8.7   Albumin 4.1 4.0 4.2  --    Total Protein 7.9 7.7 8.2  --    Sodium 137 137 133 L 135 L   Potassium 3.7 3.4 L 3.5 3.5   CO2 24 21 L 24 25   Chloride 105 106 103 107   BUN 9 9 12 8   Creatinine 0.9 0.9 0.9 0.6   Alkaline Phosphatase 57 62 56   --    ALT 7 L 17 19  --    AST 14 20 23  --    Total Bilirubin 0.8 0.5 0.7  --            URINALYSIS:  Recent Labs   Lab 09/06/23  1853   Color, UA Yellow   Specific Gravity, UA 1.020   pH, UA 7.0   Protein, UA Trace A   Bacteria Negative   Nitrite, UA Negative   Leukocytes, UA 3+ A   Urobilinogen, UA 2.0-3.0 A   Hyaline Casts, UA 11 A        LIPIDS:  Recent Labs   Lab 01/03/22  0900 01/10/23  0825   TSH 1.510 1.316   HDL 35 L 41   Cholesterol 151 158   Triglycerides 93 76   LDL Cholesterol 97.4 101.8   HDL/Cholesterol Ratio 23.2 25.9   Non-HDL Cholesterol 116 117   Total Cholesterol/HDL Ratio 4.3 3.9       TSH:  Recent Labs   Lab 01/03/22  0900 01/10/23  0825   TSH 1.510 1.316       Other:   Recent Labs   Lab 11/05/21  1415 01/03/22  0900 05/20/22  1445 11/14/22  0807 07/10/23  1535   Vit D, 25-Hydroxy 10 L  --  24 L 31  --    Vitamin B-12 253  --   --   --   --    Ferritin 51   < >  --   --  72   Iron 69   < >  --   --  80   Transferrin 278   < >  --   --  261   TIBC 411   < >  --   --  386   Saturated Iron 17 L   < >  --   --  21    < > = values in this interval not displayed.     Recent Labs   Lab 11/01/21  1355   Hepatitis C Ab Negative       Assessment/Plan     Ilene Garber is a 48 y.o.female with:    Hypertension, unspecified type  - Controlled.  Cont current.     Abdominal wall abscess  - Improved s/p recent Abx which she completed.  Refer back to Gen Sx, Dr. Thomason. Cx's were neg as above. Monitor for any new/worsening Sx's as discussed and alert MD immediately.     Mild intermittent asthma without complication  -     albuterol (VENTOLIN HFA) 90 mcg/actuation inhaler; Inhale 2 puffs into the lungs every 6 (six) hours as needed for Wheezing. Rescue  Dispense: 18 g; Refill: 3  - Stable.  Cont current regimen.    Microscopic hematuria  -     Urinalysis, Reflex to Urine Culture Urine, Clean Catch  - Repeat UA to look for hematuria. Pt was not on cycle with last specimen collection and is not currently  on cycle.     Abnormal uterine bleeding  - Fu by OB.  Has upcoming Pelvic U/S.     Class 1 obesity due to excess calories with serious comorbidity and body mass index (BMI) of 30.0 to 30.9 in adult  - Rec diet and exercise  for wt loss.      Colitis  - Fu by GI. Completed Abx. Feeling better.     Chronic conditions status updated as per HPI.  Other than changes above, cont current medications and maintain follow up with specialists.  Follow up in about 4 months (around 1/10/2024) for well visit or sooner if needed.      Uma Henry MD  [facility]

## 2023-09-18 ENCOUNTER — TELEPHONE (OUTPATIENT)
Dept: SURGERY | Facility: CLINIC | Age: 48
End: 2023-09-18
Payer: COMMERCIAL

## 2023-09-18 LAB
FINAL PATHOLOGIC DIAGNOSIS: NORMAL
Lab: NORMAL

## 2023-09-18 NOTE — TELEPHONE ENCOUNTER
----- Message from Uma Henry MD sent at 9/15/2023  1:53 PM CDT -----  Could you please assist pt with making a fu with Dr. Thomason? She was recently d/c for an abdominal wall abscess near site of prev hernia repair    Dr. CONTRERAS

## 2023-09-20 PROBLEM — S30.1XXA ABDOMINAL HEMATOMA: Status: RESOLVED | Noted: 2023-09-06 | Resolved: 2023-09-20

## 2023-09-20 PROBLEM — L02.211 ABDOMINAL WALL ABSCESS: Status: RESOLVED | Noted: 2023-09-15 | Resolved: 2023-09-20

## 2023-09-20 PROBLEM — R07.89 MUSCULOSKELETAL CHEST PAIN: Status: RESOLVED | Noted: 2020-12-03 | Resolved: 2023-09-20

## 2023-09-27 ENCOUNTER — TELEPHONE (OUTPATIENT)
Dept: SURGERY | Facility: CLINIC | Age: 48
End: 2023-09-27
Payer: COMMERCIAL

## 2023-09-29 ENCOUNTER — LAB VISIT (OUTPATIENT)
Dept: LAB | Facility: HOSPITAL | Age: 48
End: 2023-09-29
Attending: OBSTETRICS & GYNECOLOGY
Payer: COMMERCIAL

## 2023-09-29 DIAGNOSIS — N93.9 ABNORMAL UTERINE BLEEDING (AUB): ICD-10-CM

## 2023-09-29 LAB
BASOPHILS # BLD AUTO: 0.07 K/UL (ref 0–0.2)
BASOPHILS NFR BLD: 0.9 % (ref 0–1.9)
DIFFERENTIAL METHOD: ABNORMAL
EOSINOPHIL # BLD AUTO: 0.1 K/UL (ref 0–0.5)
EOSINOPHIL NFR BLD: 1.3 % (ref 0–8)
ERYTHROCYTE [DISTWIDTH] IN BLOOD BY AUTOMATED COUNT: 13.9 % (ref 11.5–14.5)
ESTRADIOL SERPL-MCNC: 167 PG/ML
FSH SERPL-ACNC: 8.67 MIU/ML
HCT VFR BLD AUTO: 35.8 % (ref 37–48.5)
HGB BLD-MCNC: 11.7 G/DL (ref 12–16)
IMM GRANULOCYTES # BLD AUTO: 0.01 K/UL (ref 0–0.04)
IMM GRANULOCYTES NFR BLD AUTO: 0.1 % (ref 0–0.5)
LH SERPL-ACNC: 7.3 MIU/ML
LYMPHOCYTES # BLD AUTO: 2.6 K/UL (ref 1–4.8)
LYMPHOCYTES NFR BLD: 35.2 % (ref 18–48)
MCH RBC QN AUTO: 28.1 PG (ref 27–31)
MCHC RBC AUTO-ENTMCNC: 32.7 G/DL (ref 32–36)
MCV RBC AUTO: 86 FL (ref 82–98)
MONOCYTES # BLD AUTO: 0.7 K/UL (ref 0.3–1)
MONOCYTES NFR BLD: 8.8 % (ref 4–15)
NEUTROPHILS # BLD AUTO: 4 K/UL (ref 1.8–7.7)
NEUTROPHILS NFR BLD: 53.7 % (ref 38–73)
NRBC BLD-RTO: 0 /100 WBC
PLATELET # BLD AUTO: 325 K/UL (ref 150–450)
PMV BLD AUTO: 10.9 FL (ref 9.2–12.9)
RBC # BLD AUTO: 4.16 M/UL (ref 4–5.4)
WBC # BLD AUTO: 7.48 K/UL (ref 3.9–12.7)

## 2023-09-29 PROCEDURE — 82670 ASSAY OF TOTAL ESTRADIOL: CPT | Performed by: OBSTETRICS & GYNECOLOGY

## 2023-09-29 PROCEDURE — 83001 ASSAY OF GONADOTROPIN (FSH): CPT | Performed by: OBSTETRICS & GYNECOLOGY

## 2023-09-29 PROCEDURE — 83002 ASSAY OF GONADOTROPIN (LH): CPT | Performed by: OBSTETRICS & GYNECOLOGY

## 2023-09-29 PROCEDURE — 36415 COLL VENOUS BLD VENIPUNCTURE: CPT | Mod: PN | Performed by: OBSTETRICS & GYNECOLOGY

## 2023-09-29 PROCEDURE — 85025 COMPLETE CBC W/AUTO DIFF WBC: CPT | Performed by: OBSTETRICS & GYNECOLOGY

## 2023-10-09 RX ORDER — PANTOPRAZOLE SODIUM 40 MG/1
40 TABLET, DELAYED RELEASE ORAL
Qty: 90 TABLET | Refills: 0 | Status: SHIPPED | OUTPATIENT
Start: 2023-10-09 | End: 2024-01-10 | Stop reason: SDUPTHER

## 2023-10-18 ENCOUNTER — OFFICE VISIT (OUTPATIENT)
Dept: GASTROENTEROLOGY | Facility: CLINIC | Age: 48
End: 2023-10-18
Payer: COMMERCIAL

## 2023-10-18 VITALS
WEIGHT: 197.06 LBS | DIASTOLIC BLOOD PRESSURE: 83 MMHG | BODY MASS INDEX: 30.93 KG/M2 | HEIGHT: 67 IN | SYSTOLIC BLOOD PRESSURE: 125 MMHG | HEART RATE: 79 BPM

## 2023-10-18 DIAGNOSIS — R93.5 ABNORMAL ABDOMINAL CT SCAN: Primary | ICD-10-CM

## 2023-10-18 DIAGNOSIS — R10.33 PERIUMBILICAL PAIN: ICD-10-CM

## 2023-10-18 DIAGNOSIS — K59.04 CHRONIC IDIOPATHIC CONSTIPATION: ICD-10-CM

## 2023-10-18 PROCEDURE — 3008F PR BODY MASS INDEX (BMI) DOCUMENTED: ICD-10-PCS | Mod: CPTII,S$GLB,, | Performed by: INTERNAL MEDICINE

## 2023-10-18 PROCEDURE — 1159F PR MEDICATION LIST DOCUMENTED IN MEDICAL RECORD: ICD-10-PCS | Mod: CPTII,S$GLB,, | Performed by: INTERNAL MEDICINE

## 2023-10-18 PROCEDURE — 1159F MED LIST DOCD IN RCRD: CPT | Mod: CPTII,S$GLB,, | Performed by: INTERNAL MEDICINE

## 2023-10-18 PROCEDURE — 3074F SYST BP LT 130 MM HG: CPT | Mod: CPTII,S$GLB,, | Performed by: INTERNAL MEDICINE

## 2023-10-18 PROCEDURE — 1160F PR REVIEW ALL MEDS BY PRESCRIBER/CLIN PHARMACIST DOCUMENTED: ICD-10-PCS | Mod: CPTII,S$GLB,, | Performed by: INTERNAL MEDICINE

## 2023-10-18 PROCEDURE — 3008F BODY MASS INDEX DOCD: CPT | Mod: CPTII,S$GLB,, | Performed by: INTERNAL MEDICINE

## 2023-10-18 PROCEDURE — 99215 OFFICE O/P EST HI 40 MIN: CPT | Mod: S$GLB,,, | Performed by: INTERNAL MEDICINE

## 2023-10-18 PROCEDURE — 1160F RVW MEDS BY RX/DR IN RCRD: CPT | Mod: CPTII,S$GLB,, | Performed by: INTERNAL MEDICINE

## 2023-10-18 PROCEDURE — 3079F PR MOST RECENT DIASTOLIC BLOOD PRESSURE 80-89 MM HG: ICD-10-PCS | Mod: CPTII,S$GLB,, | Performed by: INTERNAL MEDICINE

## 2023-10-18 PROCEDURE — 99999 PR PBB SHADOW E&M-EST. PATIENT-LVL V: CPT | Mod: PBBFAC,,, | Performed by: INTERNAL MEDICINE

## 2023-10-18 PROCEDURE — 99215 PR OFFICE/OUTPT VISIT, EST, LEVL V, 40-54 MIN: ICD-10-PCS | Mod: S$GLB,,, | Performed by: INTERNAL MEDICINE

## 2023-10-18 PROCEDURE — 99999 PR PBB SHADOW E&M-EST. PATIENT-LVL V: ICD-10-PCS | Mod: PBBFAC,,, | Performed by: INTERNAL MEDICINE

## 2023-10-18 PROCEDURE — 3074F PR MOST RECENT SYSTOLIC BLOOD PRESSURE < 130 MM HG: ICD-10-PCS | Mod: CPTII,S$GLB,, | Performed by: INTERNAL MEDICINE

## 2023-10-18 PROCEDURE — 3079F DIAST BP 80-89 MM HG: CPT | Mod: CPTII,S$GLB,, | Performed by: INTERNAL MEDICINE

## 2023-10-18 NOTE — PATIENT INSTRUCTIONS
"Procedure: Colonoscopy    Diagnosis: Constipation, abnormal CT scan of the colon:  Colon: Moderate feces within the ascending and transverse colon, and constipation is not excluded. There is mild wall thickening along the descending colon, which could in part be artifactual related to incomplete distention, though colitis is not excluded.    Procedure Timin-12 weeks    *If within 4 weeks selected, please pedro as high priority*    *If greater than 12 weeks, please select "5-12 weeks" and delay sending until 3 months prior to requested date*    Provider: Myself    Location: [address]    Additional Scheduling Information:  Constipation bowel prep protocol    Prep Specifications:Extended/Constipation prep    Is the patient taking a GLP-1 Agonist:no    Have you attached a patient to this message: yes   "

## 2023-10-18 NOTE — PROGRESS NOTES
[facility] Consultation Note    Reason for Consult:  The primary encounter diagnosis was Abnormal abdominal CT scan. Diagnoses of Periumbilical pain and Chronic idiopathic constipation were also pertinent to this visit.    PCP:   Uma Henry       Referring MD:  No referring provider defined for this encounter.    Initial History of Present Illness (HPI):  This is a 48 y.o. female here for evaluation of week long history of periumbilical pain that brought her to the ER she said she got admitted to the hospital overnight got antibiotic CT scan shows either a hematoma or possible abscess periumbilical no drainage was done she was treated with IV antibiotics and then oral doxycycline for 5 days she says she is here for follow-up she had a laparoscopic cholecystectomy followed by excisional hernia repair with mesh will refer her back to her general surgeon for further evaluation she is feeling better no nausea or vomiting she does have some chronic constipation MiraLax never work for never been on Linzess is interested in a trial Linzess will recommend colonoscopy for further evaluation of nonspecific colitis versus underdistention will repeat lab work today no dysphagia no odynophagia no fever chills feeling better.    Abdominal pain - as above  Reflux - no  Dysphagia - no   Bowel habits -constipation  GI bleeding - none  NSAID usage - none    Interval HPI 10/18/2023:  The patient's last visit with me was on 11/5/2021.      ROS:  Constitutional: No fevers, chills, No weight loss  ENT:  No heartburn no dysphagia no odynophagia no hoarseness  CV: No chest pain, no palpitation  Pulm: No cough, No shortness of breath, no wheezing  Ophtho: No vision changes  GI: see HPI  Derm: No rash, no itching  Heme: No lymphadenopathy, No easy bruising  MSK: No significant arthritis  : No dysuria, No hematuria  Endo: No hot or cold intolerance  Neuro: No syncope, No seizure, no strokes  Psych: No  uncontrolled anxiety, No uncontrolled depression    Medical History:  has a past medical history of Asthma, GERD (gastroesophageal reflux disease), H. pylori infection, Hypertension, and Ovarian cyst.    Surgical History:  has a past surgical history that includes  section; LAPAROSCOPY LYSIS OF ADHESIONS (2018); Bilateral tubal ligation; Esophagogastroduodenoscopy (N/A, 2022); Colonoscopy (N/A, 2022); Laparoscopic cholecystectomy (N/A, 2022); Breast biopsy (Left, ); Esophagogastroduodenoscopy (N/A, 2022); Robot-assisted repair of incisional hernia using da Vamshi Xi (N/A, 2022); Tubal ligation; Hernia repair (2022); and Cholecystectomy (2022).    Family History: family history includes Breast cancer (age of onset: 42) in her sister; COPD in her mother; Coronary artery disease in her mother; Diabetes in her maternal grandmother; Hypertension in her maternal grandmother..     Social History:  reports that she has never smoked. She has never been exposed to tobacco smoke. She has never used smokeless tobacco. She reports that she does not currently use alcohol. She reports that she does not use drugs.    Review of patient's allergies indicates:  No Known Allergies    Medication List with Changes/Refills   New Medications    LINACLOTIDE (LINZESS) 145 MCG CAP CAPSULE    Take 1 capsule (145 mcg total) by mouth before breakfast.   Current Medications    ALBUTEROL (PROVENTIL) 5 MG/ML NEBULIZER SOLUTION    Inhale 2 puffs into the lungs every 4 (four) hours as needed for Shortness of Breath.    ALBUTEROL (VENTOLIN HFA) 90 MCG/ACTUATION INHALER    Inhale 2 puffs into the lungs every 6 (six) hours as needed for Wheezing. Rescue    AMLODIPINE (NORVASC) 5 MG TABLET    Take 1 tablet (5 mg total) by mouth once daily.    CICLOPIROX (PENLAC) 8 % SOLN    Apply topically nightly. Apply to affected  toenail(s) and adjacent skin once daily in combination with weekly nail trimming  "and periodic nail debridement. Remove with alcohol every 7 days; continue therapy until nail clearance (max duration 48wks)    FLUTICASONE PROPIONATE (FLONASE) 50 MCG/ACTUATION NASAL SPRAY    2 sprays (100 mcg total) by Each Nostril route once daily.    PANTOPRAZOLE (PROTONIX) 40 MG TABLET    TAKE 1 TABLET BY MOUTH BEFORE BREAKFAST    SPIRONOLACTONE (ALDACTONE) 100 MG TABLET    Take 1 tablet (100 mg total) by mouth once daily.    TIZANIDINE (ZANAFLEX) 4 MG TABLET    TAKE 1 TABLET BY MOUTH EVERY DAY AT BEDTIME AS NEEDED FOR BACK PAIN    TRETINOIN (RETIN-A) 0.05 % CREAM    Apply topically every evening.    VALACYCLOVIR (VALTREX) 500 MG TABLET    TAKE 1 TABLET BY MOUTH TWICE DAILY FOR 3 DAYS AS NEEDED FOR  FLARE         Objective Findings:    Vital Signs:  /83   Pulse 79   Ht 5' 7" (1.702 m)   Wt 89.4 kg (197 lb 1.5 oz)   LMP 10/14/2023 (Approximate)   BMI 30.87 kg/m²   Body mass index is 30.87 kg/m².    Physical Exam:  General Appearance: Well appearing in no acute distress  Eyes:    No scleral icterus  ENT:  No lesions or masses   Lungs: CTA bilaterally, no wheezes, no rhonchi, no rales  Heart:  S1, S2 normal, no murmurs heard  Abdomen:  Non distended, soft, no guarding, no rebound, mild periumbilical tenderness, no appreciated ascites, no bruits, no hepatosplenomegaly,  No CVA tenderness, no appreciated hernias, no Caballero s sign no McBurney point tenderness.  Well-healed laparoscopic port sites as well as a ventral lower abdominal scar well healed from her hysterectomy  Skin: No petechiae or rash on exposed skin areas  Neurologic:  Alert and oriented x4  Psychiatric:  Normal speech mentation and affect    Labs:  Lab Results   Component Value Date    WBC 7.48 09/29/2023    HGB 11.7 (L) 09/29/2023    HCT 35.8 (L) 09/29/2023     09/29/2023    CHOL 158 01/10/2023    TRIG 76 01/10/2023    HDL 41 01/10/2023    ALT 19 09/06/2023    AST 23 09/06/2023     (L) 09/07/2023    K 3.5 09/07/2023     " 09/07/2023    CREATININE 0.6 09/07/2023    BUN 8 09/07/2023    CO2 25 09/07/2023    TSH 1.316 01/10/2023            Medical Decision Making:  Total time with patient reviewing prior medical records taking history physical exam making recommendations  Reviewing reports and images Has been 40 minutes with 50% of the time face-to-face in room with patient today  Lab work reviewed  Prior EGD colonoscopy images and path personally reviewed by myself  CT scan images and report personally reviewed by myself recommend follow-up  Constipation bowel prep talk given colonoscopy follow-up talk given  MiraLax talk given has not given patient may benefit Linzess talk given  DATE OF PROCEDURE: 02/07/2022  SERVICE: General Surgery.   Surgeon(s) and Role:     * Chau Thomason Jr., MD - Primary     * Felecia Castro MD - Resident - Assisting  PREOPERATIVE DIAGNOSIS: Symptomatic Cholelithiasis  POSTOPERATIVE DIAGNOSIS: Same.   PROCEDURE: Lap delilah.   ANESTHESIA: General endotracheal and local.      Surgery Date: 9/22/2022      Surgeon(s) and Role:     * Chau Thomason Jr., MD - Primary     * Wilton Uriostegui MD - Resident - Chief        Pre-op Diagnosis:  Reducible bulge of abdominal wall [R19.00]     Post-op Diagnosis:  Post-Op Diagnosis Codes:     * Reducible bulge of abdominal wall [R19.00]     Procedure(s) (LRB):  XI ROBOTIC REPAIR, HERNIA, INCISIONAL w/ mesh (N/A)     Anesthesia: General     Operative Findings:   See op note     Estimated Blood Loss: 10 ml         Specimens:   Specimen (24h ago, onward)       Assessment:  1. Abnormal abdominal CT scan    2. Periumbilical pain    3. Chronic idiopathic constipation         Recommendations:  1. Lab work today  2. Referral to endoscopy schedulers for colonoscopy for further evaluation of chronic constipation abnormal CT scan of the colon constipation bowel prep protocol  3. Start Linzess 145 mcg once daily as needed for chronic constipation  4. Follow-up with Chau MARTINEZ  Paddy Gibbs M.D.  in General surgery for periumbilical pain abnormal CT scan of the periumbilical area with history of laparoscopic cholecystectomy and incisional hernia repair with mesh      Order summary:  Orders Placed This Encounter    Ferritin    Iron and TIBC    Hemoglobin    TISSUE TRANSGLUTAMINASE (TTG), IGA    IgA    Lipase    Hepatic Function Panel    Basic Metabolic Panel    Ambulatory referral/consult to General Surgery    linaCLOtide (LINZESS) 145 mcg Cap capsule         Thank you so much for allowing me to participate in the care of Ilene Garber    Cirilo Tate MD    DISCLAIMER: This note was prepared with [employer] voice recognition transcription software. Garbled syntax, mangled or inadvertent pronouns, and other bizarre constructions may be attributed to that software system. While efforts were made to correct any mistakes made by this voice recognition program, some errors and/or omissions may remain in the note that were missed when the note was originally created.

## 2023-10-18 NOTE — Clinical Note
GI MA team Please have patient see endoscopy schedulers today for colonoscopy referral placed  Please have patient go to 2nd floor lab for lab work today orders placed  Please refer patient to Dr. Chau Thomason in general surgery for follow-up of periumbilical pain abnormal CT scan referral placed  Prescription for Linzess sent to her pharmacy  Return to GI clinic telemedicine video visit 3 months for follow-up

## 2023-10-18 NOTE — Clinical Note
"Procedure: Colonoscopy  Diagnosis: Constipation, abnormal CT scan of the colon: Colon: Moderate feces within the ascending and transverse colon, and constipation is not excluded. There is mild wall thickening along the descending colon, which could in part be artifactual related to incomplete distention, though colitis is not excluded.  Procedure Timin-12 weeks  *If within 4 weeks selected, please pedro as high priority*  *If greater than 12 weeks, please select "5-12 weeks" and delay sending until 3 months prior to requested date*  Provider: Myself  Location: [address]  Additional Scheduling Information:  Constipation bowel prep protocol  Prep Specifications:Extended/Constipation prep  Is the patient taking a GLP-1 Agonist:no  Have you attached a patient to this message: yes "

## 2023-10-18 NOTE — PROGRESS NOTES
"GENERAL GI PATIENT INTAKE:    COVID symptoms in the last 7 days (runny nose, sore throat, congestion, cough, fever): No  PCP: Uma Henry  If not PCP-  number given to establish [phone]: N/A    ALLERGIES REVIEWED:  N/A    CHIEF COMPLAINT:    Chief Complaint   Patient presents with    Gas    Gastroesophageal Reflux       VITAL SIGNS:  /83   Pulse 79   Ht 5' 7" (1.702 m)   Wt 89.4 kg (197 lb 1.5 oz)   LMP 10/14/2023 (Approximate)   BMI 30.87 kg/m²      Change in medical, surgical, family or social history: No      REVIEWED MEDICATION LIST RECONCILED INCLUDING ABOVE MEDS:  Yes     "

## 2023-10-19 ENCOUNTER — DOCUMENTATION ONLY (OUTPATIENT)
Dept: PHARMACY | Facility: CLINIC | Age: 48
End: 2023-10-19
Payer: COMMERCIAL

## 2023-11-01 ENCOUNTER — OFFICE VISIT (OUTPATIENT)
Dept: OBSTETRICS AND GYNECOLOGY | Facility: CLINIC | Age: 48
End: 2023-11-01
Payer: COMMERCIAL

## 2023-11-01 VITALS
HEIGHT: 67 IN | SYSTOLIC BLOOD PRESSURE: 134 MMHG | BODY MASS INDEX: 30.42 KG/M2 | DIASTOLIC BLOOD PRESSURE: 70 MMHG | WEIGHT: 193.81 LBS

## 2023-11-01 DIAGNOSIS — Z11.3 ENCNTR SCREEN FOR INFECTIONS W SEXL MODE OF TRANSMISS: ICD-10-CM

## 2023-11-01 DIAGNOSIS — M54.50 INTERMITTENT LOW BACK PAIN: ICD-10-CM

## 2023-11-01 DIAGNOSIS — N89.8 VAGINAL DISCHARGE: Primary | ICD-10-CM

## 2023-11-01 PROCEDURE — 3078F DIAST BP <80 MM HG: CPT | Mod: CPTII,S$GLB,, | Performed by: OBSTETRICS & GYNECOLOGY

## 2023-11-01 PROCEDURE — 99214 PR OFFICE/OUTPT VISIT, EST, LEVL IV, 30-39 MIN: ICD-10-PCS | Mod: S$GLB,,, | Performed by: OBSTETRICS & GYNECOLOGY

## 2023-11-01 PROCEDURE — 3008F PR BODY MASS INDEX (BMI) DOCUMENTED: ICD-10-PCS | Mod: CPTII,S$GLB,, | Performed by: OBSTETRICS & GYNECOLOGY

## 2023-11-01 PROCEDURE — 99214 OFFICE O/P EST MOD 30 MIN: CPT | Mod: S$GLB,,, | Performed by: OBSTETRICS & GYNECOLOGY

## 2023-11-01 PROCEDURE — 3008F BODY MASS INDEX DOCD: CPT | Mod: CPTII,S$GLB,, | Performed by: OBSTETRICS & GYNECOLOGY

## 2023-11-01 PROCEDURE — 81514 NFCT DS BV&VAGINITIS DNA ALG: CPT | Performed by: OBSTETRICS & GYNECOLOGY

## 2023-11-01 PROCEDURE — 3075F PR MOST RECENT SYSTOLIC BLOOD PRESS GE 130-139MM HG: ICD-10-PCS | Mod: CPTII,S$GLB,, | Performed by: OBSTETRICS & GYNECOLOGY

## 2023-11-01 PROCEDURE — 3075F SYST BP GE 130 - 139MM HG: CPT | Mod: CPTII,S$GLB,, | Performed by: OBSTETRICS & GYNECOLOGY

## 2023-11-01 PROCEDURE — 99999 PR PBB SHADOW E&M-EST. PATIENT-LVL III: CPT | Mod: PBBFAC,,, | Performed by: OBSTETRICS & GYNECOLOGY

## 2023-11-01 PROCEDURE — 1159F MED LIST DOCD IN RCRD: CPT | Mod: CPTII,S$GLB,, | Performed by: OBSTETRICS & GYNECOLOGY

## 2023-11-01 PROCEDURE — 99999 PR PBB SHADOW E&M-EST. PATIENT-LVL III: ICD-10-PCS | Mod: PBBFAC,,, | Performed by: OBSTETRICS & GYNECOLOGY

## 2023-11-01 PROCEDURE — 1159F PR MEDICATION LIST DOCUMENTED IN MEDICAL RECORD: ICD-10-PCS | Mod: CPTII,S$GLB,, | Performed by: OBSTETRICS & GYNECOLOGY

## 2023-11-01 PROCEDURE — 3078F PR MOST RECENT DIASTOLIC BLOOD PRESSURE < 80 MM HG: ICD-10-PCS | Mod: CPTII,S$GLB,, | Performed by: OBSTETRICS & GYNECOLOGY

## 2023-11-01 RX ORDER — FLUCONAZOLE 150 MG/1
150 TABLET ORAL DAILY
Qty: 1 TABLET | Refills: 1 | Status: SHIPPED | OUTPATIENT
Start: 2023-11-01 | End: 2024-01-10

## 2023-11-01 RX ORDER — CLOTRIMAZOLE AND BETAMETHASONE DIPROPIONATE 10; .64 MG/G; MG/G
CREAM TOPICAL 2 TIMES DAILY
Qty: 45 G | Refills: 1 | Status: SHIPPED | OUTPATIENT
Start: 2023-11-01

## 2023-11-01 NOTE — PROGRESS NOTES
Subjective:   Chief Complaint:  Vaginal Itching (Discharge and odor)       Patient ID: Ilene Garber is a  48 y.o. female.    Contraception:  Bilateral tubal sterilization    Date: 2023    The patient presents today due to the following:  Over the last three days she reports a persistent whitish yellow vaginal discharge with associated vulvar vaginal itching and odor.  No pelvic pain or abnormal bleeding   She would like STI testing.    GYN & OB History  Patient's last menstrual period was 10/14/2023 (approximate).     Date of Last Pap: 2023  OB History          3    Para   3    Term   3            AB        Living   3         SAB        IAB        Ectopic        Multiple        Live Births   3           Obstetric Comments    x 3               Allergies: Review of patient's allergies indicates:  No Known Allergies    Past Medical History:   Diagnosis Date    Asthma     GERD (gastroesophageal reflux disease)     H. pylori infection     Hypertension     Ovarian cyst        Past Surgical History:   Procedure Laterality Date    BILATERAL TUBAL LIGATION      BREAST BIOPSY Left     benign     SECTION      x 3    CHOLECYSTECTOMY  2022    COLONOSCOPY N/A 2022    Procedure: COLONOSCOPY;  Surgeon: Cirilo Tate MD;  Location: [address]);  Service: Endoscopy;  Laterality: N/A;  Constipation bowel prep    ESOPHAGOGASTRODUODENOSCOPY N/A 2022    Procedure: EGD (ESOPHAGOGASTRODUODENOSCOPY);  Surgeon: Cirilo Tate MD;  Location: [address]);  Service: Endoscopy;  Laterality: N/A;  COVID test at [place] on -GT    ESOPHAGOGASTRODUODENOSCOPY N/A 2022    Procedure: EGD (ESOPHAGOGASTRODUODENOSCOPY);  Surgeon: Cirilo Tate MD;  Location: [address]);  Service: Endoscopy;  Laterality: N/A;    HERNIA REPAIR  2022    LAPAROSCOPIC CHOLECYSTECTOMY N/A 2022    Procedure: CHOLECYSTECTOMY, LAPAROSCOPIC;  Surgeon:  Chau Thomason Jr., MD;  Location: [facility] OR 2ND FLR;  Service: General;  Laterality: N/A;    LAPAROSCOPY LYSIS OF ADHESIONS  11/28/2018    Myomectomy    ROBOT-ASSISTED REPAIR OF INCISIONAL HERNIA USING DA LOUIE XI N/A 09/22/2022    Procedure: XI ROBOTIC REPAIR, HERNIA, INCISIONAL w/ mesh;  Surgeon: Chau Thomason Jr., MD;  Location: [facility] OR 2ND FLR;  Service: General;  Laterality: N/A;    TUBAL LIGATION         Medications    Current Outpatient Medications:     albuterol (PROVENTIL) 5 mg/mL nebulizer solution, Inhale 2 puffs into the lungs every 4 (four) hours as needed for Shortness of Breath., Disp: , Rfl:     albuterol (VENTOLIN HFA) 90 mcg/actuation inhaler, Inhale 2 puffs into the lungs every 6 (six) hours as needed for Wheezing. Rescue, Disp: 18 g, Rfl: 3    amLODIPine (NORVASC) 5 MG tablet, Take 1 tablet (5 mg total) by mouth once daily., Disp: 90 tablet, Rfl: 1    ciclopirox (PENLAC) 8 % Soln, Apply topically nightly. Apply to affected  toenail(s) and adjacent skin once daily in combination with weekly nail trimming and periodic nail debridement. Remove with alcohol every 7 days; continue therapy until nail clearance (max duration 48wks) (Patient taking differently: Apply 1 g topically nightly. Apply to affected  toenail(s) and adjacent skin once daily in combination with weekly nail trimming and periodic nail debridement. Remove with alcohol every 7 days; continue therapy until nail clearance (max duration 48wks)), Disp: 6.6 mL, Rfl: 2    fluticasone propionate (FLONASE) 50 mcg/actuation nasal spray, 2 sprays (100 mcg total) by Each Nostril route once daily., Disp: 18.2 mL, Rfl: 8    linaCLOtide (LINZESS) 145 mcg Cap capsule, Take 1 capsule (145 mcg total) by mouth before breakfast., Disp: 90 capsule, Rfl: 0    pantoprazole (PROTONIX) 40 MG tablet, TAKE 1 TABLET BY MOUTH BEFORE BREAKFAST, Disp: 90 tablet, Rfl: 0    spironolactone (ALDACTONE) 100 MG tablet, Take 1 tablet (100 mg total) by mouth once  daily., Disp: 30 tablet, Rfl: 5    tretinoin (RETIN-A) 0.05 % cream, Apply topically every evening. (Patient taking differently: Apply 1 g topically every evening.), Disp: 45 g, Rfl: 5    valACYclovir (VALTREX) 500 MG tablet, TAKE 1 TABLET BY MOUTH TWICE DAILY FOR 3 DAYS AS NEEDED FOR  FLARE (Patient taking differently: Take 500 mg by mouth 2 (two) times daily. TAKE 1 TABLET BY MOUTH TWICE DAILY FOR 3 DAYS AS NEEDED FOR  FLARE), Disp: 60 tablet, Rfl: 0    clotrimazole-betamethasone 1-0.05% (LOTRISONE) cream, Apply topically 2 (two) times daily. PRN itch, Disp: 45 g, Rfl: 1    fluconazole (DIFLUCAN) 150 MG Tab, Take 1 tablet (150 mg total) by mouth once daily., Disp: 1 tablet, Rfl: 1    tiZANidine (ZANAFLEX) 4 MG tablet, TAKE 1 TABLET BY MOUTH EVERY DAY AT BEDTIME AS NEEDED FOR BACK PAIN, Disp: 30 tablet, Rfl: 0     Social History     Tobacco Use    Smoking status: Never     Passive exposure: Never    Smokeless tobacco: Never   Substance Use Topics    Alcohol use: Not Currently     Comment: Special occasions    Drug use: Never       Family History   Problem Relation Age of Onset    COPD Mother     Coronary artery disease Mother         s/p CABG    Breast cancer Sister 42    Hypertension Maternal Grandmother     Diabetes Maternal Grandmother     Colon cancer Neg Hx     Ovarian cancer Neg Hx     Celiac disease Neg Hx     Cirrhosis Neg Hx     Crohn's disease Neg Hx     Esophageal cancer Neg Hx     Inflammatory bowel disease Neg Hx     Liver cancer Neg Hx     Liver disease Neg Hx     Rectal cancer Neg Hx     Stomach cancer Neg Hx     Ulcerative colitis Neg Hx     Pancreatic cancer Neg Hx     Kidney cancer Neg Hx     Bladder Cancer Neg Hx     Uterine cancer Neg Hx        Review of Systems (at today's evaluation)  Review of Systems   Constitutional:  Negative for fever and unexpected weight change.   Respiratory: Negative.     Cardiovascular:  Negative for chest pain and palpitations.   Gastrointestinal:  Negative for  nausea and vomiting.   Genitourinary:  Negative for dysuria, hematuria and urgency.        Gyn as per HPI   Neurological:  Negative for headaches.        Objective:      Vitals:    11/01/23 1122   BP: 134/70     Physical Exam:   Constitutional: She appears well-developed and well-nourished.    HENT:   Head: Normocephalic.     Neck: No thyroid mass present.    Cardiovascular:  Normal rate.             Pulmonary/Chest: Effort normal. No respiratory distress.        Abdominal: Soft. There is no abdominal tenderness.     Genitourinary:    Inguinal canal, uterus, right adnexa and left adnexa normal.      Pelvic exam was performed with patient supine.   The external female genitalia was normal.   No external genitalia lesions identified,Cervix is normal. Right adnexum displays no mass and no tenderness. Left adnexum displays no mass and no tenderness. There is erythema and vaginal discharge (yeast visualized) in the vagina. No tenderness or bleeding in the vagina. Uterus is not tender. Normal urethral meatus.Urethra findings: no tendernessBladder findings: no bladder tenderness          Musculoskeletal: Normal range of motion.      Right lower leg: No edema.      Left lower leg: No edema.      Lymphadenopathy:     She has no cervical adenopathy. No inguinal adenopathy noted on the right or left side.    Neurological: She is alert.    Skin: Skin is warm and dry.    Psychiatric: Mood normal.         Assessment:        1. Vaginal discharge    2. Encntr screen for infections w sexl mode of transmiss        Plan:      Vaginal discharge  -     Vaginosis Screen by DNA Probe  -     fluconazole (DIFLUCAN) 150 MG Tab; Take 1 tablet (150 mg total) by mouth once daily.  Dispense: 1 tablet; Refill: 1  -     clotrimazole-betamethasone 1-0.05% (LOTRISONE) cream; Apply topically 2 (two) times daily. PRN itch  Dispense: 45 g; Refill: 1    Encntr screen for infections w sexl mode of transmiss  -     Hepatitis B Surface Antigen; Future;  Expected date: 11/01/2023  -     Hepatitis C Antibody; Future; Expected date: 11/01/2023  -     RPR; Future; Expected date: 11/01/2023  -     HIV 1/2 Ag/Ab (4th Gen); Future; Expected date: 11/01/2023  -     C. trachomatis/N. gonorrhoeae by AMP DNA [facility]; Urine; Future; Expected date: 11/01/2023       Follow up for as needed / for any GYN related issues, As needed based on the results of today's testing..     The above was reviewed discussed with the patient.    We discussed her history and findings on physical exam.    At this time will proceed as follows:  A vaginitis panel was obtained as well as STI testing.    In light of the findings on physical exam the patient is being treated with oral fluconazole as well as a topical steroid antifungal.  The pros, cons, risks, benefits, alternatives and indications of the medication(s) prescribed, as well as appropriate use and potential side effects were discussed.  We will base further evaluation and treatment on the patient's response to medical intervention, results of testing and status over time.    The patient's questions were answered, and she is in agreement with the current plan.     Ubaldo Gonzalez MD  Department OBGYN [facility]

## 2023-11-01 NOTE — TELEPHONE ENCOUNTER
No care due was identified.  [facility] Embedded Care Due Messages. Reference number: [id number].   11/01/2023 5:47:08 PM CDT

## 2023-11-02 LAB
BACTERIAL VAGINOSIS DNA: NEGATIVE
CANDIDA GLABRATA DNA: NEGATIVE
CANDIDA KRUSEI DNA: NEGATIVE
CANDIDA RRNA VAG QL PROBE: POSITIVE
T VAGINALIS RRNA GENITAL QL PROBE: NEGATIVE

## 2023-11-02 RX ORDER — TIZANIDINE 4 MG/1
TABLET ORAL
Qty: 30 TABLET | Refills: 0 | Status: SHIPPED | OUTPATIENT
Start: 2023-11-02

## 2023-11-03 ENCOUNTER — LAB VISIT (OUTPATIENT)
Dept: LAB | Facility: HOSPITAL | Age: 48
End: 2023-11-03
Attending: OBSTETRICS & GYNECOLOGY
Payer: COMMERCIAL

## 2023-11-03 DIAGNOSIS — Z11.3 ENCNTR SCREEN FOR INFECTIONS W SEXL MODE OF TRANSMISS: ICD-10-CM

## 2023-11-03 PROCEDURE — 87491 CHLMYD TRACH DNA AMP PROBE: CPT | Performed by: OBSTETRICS & GYNECOLOGY

## 2023-11-04 ENCOUNTER — PATIENT MESSAGE (OUTPATIENT)
Dept: GASTROENTEROLOGY | Facility: CLINIC | Age: 48
End: 2023-11-04
Payer: COMMERCIAL

## 2023-11-04 DIAGNOSIS — Z86.39 HISTORY OF LOW POTASSIUM: Primary | ICD-10-CM

## 2023-11-04 LAB
C TRACH DNA SPEC QL NAA+PROBE: NOT DETECTED
N GONORRHOEA DNA SPEC QL NAA+PROBE: NOT DETECTED

## 2023-11-14 NOTE — PROGRESS NOTES
"History & Physical    Date: 11/15/2023  Referring Provider: Cirilo Tate    SUBJECTIVE:     Chief complaint: No chief complaint on file.    History of Present Illness:  Patient is a 48 y.o. female presents for evaluation of periumbilical pain.     Her past surgical history includes robotic incisional hernia repair with mesh (Paddy, 9/22/22) and lap delilah (Paddy 2/7/22).    She initially presented to [facility] for her periumbilical pain where a CT A/P (9/6/23) demonstrated a 4.4cm fluid collection interposed between the R and L rectus abdominus muscles which could represent hematoma or abscess. The cavity was aspirated under ultrasound guidance which yielded only 2mL of serous fluid. General Surgery at [facility] evaluated the patient and determined her abdominal exam to be benign. She was discharged on a course of PO antibiotics which she has finished her course of.    Today she is not feeling any further pain or infectious symptoms, just mild pain at the umbilical area that she notices occasionally while up and walking around at work. The pain feels like distention or "pulling." She manages her pain with the occasional ibuprofen and heating pads.   She has experienced this pain for about 3 weeks prior to her ED presentation. The pain is not limiting to her life and daily activities.    Review of patient's allergies indicates:  No Known Allergies    Current Outpatient Medications   Medication Sig Dispense Refill    albuterol (PROVENTIL) 5 mg/mL nebulizer solution Inhale 2 puffs into the lungs every 4 (four) hours as needed for Shortness of Breath.      albuterol (VENTOLIN HFA) 90 mcg/actuation inhaler Inhale 2 puffs into the lungs every 6 (six) hours as needed for Wheezing. Rescue 18 g 3    amLODIPine (NORVASC) 5 MG tablet Take 1 tablet (5 mg total) by mouth once daily. 90 tablet 1    ciclopirox (PENLAC) 8 % Soln Apply topically nightly. Apply to affected  toenail(s) and adjacent " skin once daily in combination with weekly nail trimming and periodic nail debridement. Remove with alcohol every 7 days; continue therapy until nail clearance (max duration 48wks) (Patient taking differently: Apply 1 g topically nightly. Apply to affected  toenail(s) and adjacent skin once daily in combination with weekly nail trimming and periodic nail debridement. Remove with alcohol every 7 days; continue therapy until nail clearance (max duration 48wks)) 6.6 mL 2    clotrimazole-betamethasone 1-0.05% (LOTRISONE) cream Apply topically 2 (two) times daily. PRN itch 45 g 1    fluconazole (DIFLUCAN) 150 MG Tab Take 1 tablet (150 mg total) by mouth once daily. 1 tablet 1    fluticasone propionate (FLONASE) 50 mcg/actuation nasal spray 2 sprays (100 mcg total) by Each Nostril route once daily. 18.2 mL 8    linaCLOtide (LINZESS) 145 mcg Cap capsule Take 1 capsule (145 mcg total) by mouth before breakfast. 90 capsule 0    pantoprazole (PROTONIX) 40 MG tablet TAKE 1 TABLET BY MOUTH BEFORE BREAKFAST 90 tablet 0    spironolactone (ALDACTONE) 100 MG tablet Take 1 tablet (100 mg total) by mouth once daily. 30 tablet 5    tiZANidine (ZANAFLEX) 4 MG tablet TAKE 1 TABLET BY MOUTH EVERY DAY AT BEDTIME AS NEEDED FOR BACK PAIN 30 tablet 0    tretinoin (RETIN-A) 0.05 % cream Apply topically every evening. (Patient taking differently: Apply 1 g topically every evening.) 45 g 5    valACYclovir (VALTREX) 500 MG tablet TAKE 1 TABLET BY MOUTH TWICE DAILY FOR 3 DAYS AS NEEDED FOR  FLARE (Patient taking differently: Take 500 mg by mouth 2 (two) times daily. TAKE 1 TABLET BY MOUTH TWICE DAILY FOR 3 DAYS AS NEEDED FOR  FLARE) 60 tablet 0     No current facility-administered medications for this visit.       Past Medical History:   Diagnosis Date    Asthma     GERD (gastroesophageal reflux disease)     H. pylori infection     Hypertension     Ovarian cyst      Past Surgical History:   Procedure Laterality Date    BILATERAL TUBAL LIGATION       BREAST BIOPSY Left 2020    benign     SECTION      x 3    CHOLECYSTECTOMY  2022    COLONOSCOPY N/A 2022    Procedure: COLONOSCOPY;  Surgeon: Cirilo Tate MD;  Location: [facility] END[facility] (4TH FLR);  Service: Endoscopy;  Laterality: N/A;  Constipation bowel prep    ESOPHAGOGASTRODUODENOSCOPY N/A 2022    Procedure: EGD (ESOPHAGOGASTRODUODENOSCOPY);  Surgeon: Cirilo Tate MD;  Location: [facility] END[facility] (4TH FLR);  Service: Endoscopy;  Laterality: N/A;  COVID test at [place] on -GT    ESOPHAGOGASTRODUODENOSCOPY N/A 2022    Procedure: EGD (ESOPHAGOGASTRODUODENOSCOPY);  Surgeon: Cirilo Tate MD;  Location: [facility] END[facility] (4TH FLR);  Service: Endoscopy;  Laterality: N/A;    HERNIA REPAIR  2022    LAPAROSCOPIC CHOLECYSTECTOMY N/A 2022    Procedure: CHOLECYSTECTOMY, LAPAROSCOPIC;  Surgeon: Chau Thomason Jr., MD;  Location: [facility] OR 2ND FLR;  Service: General;  Laterality: N/A;    LAPAROSCOPY LYSIS OF ADHESIONS  2018    Myomectomy    ROBOT-ASSISTED REPAIR OF INCISIONAL HERNIA USING DA LOUIE XI N/A 2022    Procedure: XI ROBOTIC REPAIR, HERNIA, INCISIONAL w/ mesh;  Surgeon: Chau Thomason Jr., MD;  Location: [facility] OR 2ND FLR;  Service: General;  Laterality: N/A;    TUBAL LIGATION       Family History   Problem Relation Age of Onset    COPD Mother     Coronary artery disease Mother         s/p CABG    Breast cancer Sister 42    Hypertension Maternal Grandmother     Diabetes Maternal Grandmother     Colon cancer Neg Hx     Ovarian cancer Neg Hx     Celiac disease Neg Hx     Cirrhosis Neg Hx     Crohn's disease Neg Hx     Esophageal cancer Neg Hx     Inflammatory bowel disease Neg Hx     Liver cancer Neg Hx     Liver disease Neg Hx     Rectal cancer Neg Hx     Stomach cancer Neg Hx     Ulcerative colitis Neg Hx     Pancreatic cancer Neg Hx     Kidney cancer Neg Hx     Bladder Cancer Neg Hx     Uterine cancer Neg Hx      Social History     Tobacco Use    Smoking  status: Never     Passive exposure: Never    Smokeless tobacco: Never   Substance Use Topics    Alcohol use: Not Currently     Comment: Special occasions    Drug use: Never        Review of Systems:  Review of Systems   Constitutional:  Negative for activity change, appetite change, chills, fatigue and fever.   Respiratory:  Negative for cough and shortness of breath.    Cardiovascular:  Negative for chest pain.   Gastrointestinal:  Negative for abdominal distention and abdominal pain.   Skin:  Negative for color change and wound.       OBJECTIVE:     Vital Signs (Most Recent)  Vitals:    11/15/23 0919   BP: 137/75   Pulse: 67       Physical Exam:  Physical Exam  Constitutional:       Appearance: Normal appearance.   Cardiovascular:      Rate and Rhythm: Normal rate.   Pulmonary:      Effort: Pulmonary effort is normal. No respiratory distress.   Abdominal:      General: There is no distension.      Palpations: Abdomen is soft. There is no mass.      Hernia: No hernia is present.      Comments: + TTP around umbilicus  No fluctuance or focal distension appreciated on exam   Musculoskeletal:         General: No swelling or tenderness. Normal range of motion.   Skin:     General: Skin is warm and dry.   Neurological:      General: No focal deficit present.      Mental Status: She is alert and oriented to person, place, and time.         Laboratory  CBC: Reviewed  CMP: Reviewed      Diagnostic Results:  CT: Result Reviewed and Films Reviewed    ASSESSMENT/PLAN:     Patient is a 48 y.o. female presents for evaluation of periumbilical pain s/p robotic incisional hernia repair with mesh (Paddy, 9/22/22) and lap delilah (Paddy 2/7/22). CT A/P obtained in Sept 2023 demonstrates likely seroma formation.    PLAN:    Pt to FU in one month for eval  Call prior if pain worsening or any other abnormality.

## 2023-11-15 ENCOUNTER — OFFICE VISIT (OUTPATIENT)
Dept: SURGERY | Facility: CLINIC | Age: 48
End: 2023-11-15
Payer: COMMERCIAL

## 2023-11-15 VITALS
SYSTOLIC BLOOD PRESSURE: 137 MMHG | BODY MASS INDEX: 30.47 KG/M2 | WEIGHT: 194.13 LBS | DIASTOLIC BLOOD PRESSURE: 75 MMHG | HEIGHT: 67 IN | HEART RATE: 67 BPM

## 2023-11-15 DIAGNOSIS — R10.33 PERIUMBILICAL PAIN: ICD-10-CM

## 2023-11-15 DIAGNOSIS — R93.5 ABNORMAL ABDOMINAL CT SCAN: ICD-10-CM

## 2023-11-15 PROCEDURE — 1159F PR MEDICATION LIST DOCUMENTED IN MEDICAL RECORD: ICD-10-PCS | Mod: CPTII,S$GLB,, | Performed by: SURGERY

## 2023-11-15 PROCEDURE — 3008F PR BODY MASS INDEX (BMI) DOCUMENTED: ICD-10-PCS | Mod: CPTII,S$GLB,, | Performed by: SURGERY

## 2023-11-15 PROCEDURE — 3008F BODY MASS INDEX DOCD: CPT | Mod: CPTII,S$GLB,, | Performed by: SURGERY

## 2023-11-15 PROCEDURE — 3075F SYST BP GE 130 - 139MM HG: CPT | Mod: CPTII,S$GLB,, | Performed by: SURGERY

## 2023-11-15 PROCEDURE — 99213 PR OFFICE/OUTPT VISIT, EST, LEVL III, 20-29 MIN: ICD-10-PCS | Mod: S$GLB,,, | Performed by: SURGERY

## 2023-11-15 PROCEDURE — 99999 PR PBB SHADOW E&M-EST. PATIENT-LVL IV: CPT | Mod: PBBFAC,,, | Performed by: SURGERY

## 2023-11-15 PROCEDURE — 1160F PR REVIEW ALL MEDS BY PRESCRIBER/CLIN PHARMACIST DOCUMENTED: ICD-10-PCS | Mod: CPTII,S$GLB,, | Performed by: SURGERY

## 2023-11-15 PROCEDURE — 99213 OFFICE O/P EST LOW 20 MIN: CPT | Mod: S$GLB,,, | Performed by: SURGERY

## 2023-11-15 PROCEDURE — 3075F PR MOST RECENT SYSTOLIC BLOOD PRESS GE 130-139MM HG: ICD-10-PCS | Mod: CPTII,S$GLB,, | Performed by: SURGERY

## 2023-11-15 PROCEDURE — 1159F MED LIST DOCD IN RCRD: CPT | Mod: CPTII,S$GLB,, | Performed by: SURGERY

## 2023-11-15 PROCEDURE — 1160F RVW MEDS BY RX/DR IN RCRD: CPT | Mod: CPTII,S$GLB,, | Performed by: SURGERY

## 2023-11-15 PROCEDURE — 3078F PR MOST RECENT DIASTOLIC BLOOD PRESSURE < 80 MM HG: ICD-10-PCS | Mod: CPTII,S$GLB,, | Performed by: SURGERY

## 2023-11-15 PROCEDURE — 3078F DIAST BP <80 MM HG: CPT | Mod: CPTII,S$GLB,, | Performed by: SURGERY

## 2023-11-15 PROCEDURE — 99999 PR PBB SHADOW E&M-EST. PATIENT-LVL IV: ICD-10-PCS | Mod: PBBFAC,,, | Performed by: SURGERY

## 2023-12-01 ENCOUNTER — HOSPITAL ENCOUNTER (OUTPATIENT)
Dept: RADIOLOGY | Facility: HOSPITAL | Age: 48
Discharge: HOME | End: 2023-12-01
Attending: OBSTETRICS & GYNECOLOGY
Payer: COMMERCIAL

## 2023-12-01 DIAGNOSIS — N93.9 ABNORMAL UTERINE BLEEDING (AUB): ICD-10-CM

## 2023-12-01 DIAGNOSIS — R10.2 PELVIC PAIN: ICD-10-CM

## 2023-12-01 PROCEDURE — 76856 US EXAM PELVIC COMPLETE: CPT | Mod: 26,,, | Performed by: RADIOLOGY

## 2023-12-01 PROCEDURE — 76830 US PELVIS COMP WITH TRANSVAG NON-OB (XPD): ICD-10-PCS | Mod: 26,,, | Performed by: RADIOLOGY

## 2023-12-01 PROCEDURE — 76830 TRANSVAGINAL US NON-OB: CPT | Mod: 26,,, | Performed by: RADIOLOGY

## 2023-12-01 PROCEDURE — 76856 US PELVIS COMP WITH TRANSVAG NON-OB (XPD): ICD-10-PCS | Mod: 26,,, | Performed by: RADIOLOGY

## 2023-12-01 PROCEDURE — 76856 US EXAM PELVIC COMPLETE: CPT | Mod: TC

## 2024-01-03 ENCOUNTER — OFFICE VISIT (OUTPATIENT)
Dept: OBSTETRICS AND GYNECOLOGY | Facility: CLINIC | Age: 49
End: 2024-01-03
Payer: COMMERCIAL

## 2024-01-03 VITALS
HEIGHT: 67 IN | HEART RATE: 76 BPM | WEIGHT: 196 LBS | SYSTOLIC BLOOD PRESSURE: 132 MMHG | DIASTOLIC BLOOD PRESSURE: 82 MMHG | BODY MASS INDEX: 30.76 KG/M2

## 2024-01-03 DIAGNOSIS — N93.9 ABNORMAL UTERINE BLEEDING (AUB): Primary | ICD-10-CM

## 2024-01-03 PROCEDURE — 99999 PR PBB SHADOW E&M-EST. PATIENT-LVL IV: CPT | Mod: PBBFAC,,, | Performed by: OBSTETRICS & GYNECOLOGY

## 2024-01-03 PROCEDURE — 1159F MED LIST DOCD IN RCRD: CPT | Mod: CPTII,S$GLB,, | Performed by: OBSTETRICS & GYNECOLOGY

## 2024-01-03 PROCEDURE — 3075F SYST BP GE 130 - 139MM HG: CPT | Mod: CPTII,S$GLB,, | Performed by: OBSTETRICS & GYNECOLOGY

## 2024-01-03 PROCEDURE — 3008F BODY MASS INDEX DOCD: CPT | Mod: CPTII,S$GLB,, | Performed by: OBSTETRICS & GYNECOLOGY

## 2024-01-03 PROCEDURE — 3079F DIAST BP 80-89 MM HG: CPT | Mod: CPTII,S$GLB,, | Performed by: OBSTETRICS & GYNECOLOGY

## 2024-01-03 PROCEDURE — 99213 OFFICE O/P EST LOW 20 MIN: CPT | Mod: S$GLB,,, | Performed by: OBSTETRICS & GYNECOLOGY

## 2024-01-03 RX ORDER — MEDROXYPROGESTERONE ACETATE 5 MG/1
5 TABLET ORAL 2 TIMES DAILY
Qty: 20 TABLET | Refills: 0 | Status: SHIPPED | OUTPATIENT
Start: 2024-01-03 | End: 2024-01-13

## 2024-01-03 NOTE — PROGRESS NOTES
Subjective:   Chief Complaint:  Follow-up (U/S and labs)       Patient ID: Ilene Garber is a  48 y.o. female.    Contraception:  Bilateral tubal sterilization    Date: 1/3/2023    The patient presents today due to the following:  Since her last evaluation she has continued to have irregular menses.  Ultrasound was ordered and the patient presents today to discuss the results as well as further evaluation and potential treatment options.    GYN & OB History  Patient's last menstrual period was 2023 (exact date).     Date of Last Pap: 2023  OB History          3    Para   3    Term   3            AB        Living   3         SAB        IAB        Ectopic        Multiple        Live Births   3           Obstetric Comments    x 3               Allergies: Review of patient's allergies indicates:  No Known Allergies    Past Medical History:   Diagnosis Date    Asthma     GERD (gastroesophageal reflux disease)     H. pylori infection     Hypertension     Ovarian cyst        Past Surgical History:   Procedure Laterality Date    BILATERAL TUBAL LIGATION      BREAST BIOPSY Left     benign     SECTION      x 3    CHOLECYSTECTOMY  2022    COLONOSCOPY N/A 2022    Procedure: COLONOSCOPY;  Surgeon: Cirilo Tate MD;  Location: [address]);  Service: Endoscopy;  Laterality: N/A;  Constipation bowel prep    ESOPHAGOGASTRODUODENOSCOPY N/A 2022    Procedure: EGD (ESOPHAGOGASTRODUODENOSCOPY);  Surgeon: Cirilo Tate MD;  Location: [address]);  Service: Endoscopy;  Laterality: N/A;  COVID test at [place] on -GT    ESOPHAGOGASTRODUODENOSCOPY N/A 2022    Procedure: EGD (ESOPHAGOGASTRODUODENOSCOPY);  Surgeon: Cirilo Tate MD;  Location: [address]);  Service: Endoscopy;  Laterality: N/A;    HERNIA REPAIR  2022    LAPAROSCOPIC CHOLECYSTECTOMY N/A 2022    Procedure: CHOLECYSTECTOMY, LAPAROSCOPIC;  Surgeon:  Chau Thomason Jr., MD;  Location: [facility] OR 2ND FLR;  Service: General;  Laterality: N/A;    LAPAROSCOPY LYSIS OF ADHESIONS  11/28/2018    Myomectomy    ROBOT-ASSISTED REPAIR OF INCISIONAL HERNIA USING DA LOUIE XI N/A 09/22/2022    Procedure: XI ROBOTIC REPAIR, HERNIA, INCISIONAL w/ mesh;  Surgeon: Chau Thomason Jr., MD;  Location: [facility] OR 2ND FLR;  Service: General;  Laterality: N/A;    TUBAL LIGATION         Medications    Current Outpatient Medications:     albuterol (PROVENTIL) 5 mg/mL nebulizer solution, Inhale 2 puffs into the lungs every 4 (four) hours as needed for Shortness of Breath., Disp: , Rfl:     albuterol (VENTOLIN HFA) 90 mcg/actuation inhaler, Inhale 2 puffs into the lungs every 6 (six) hours as needed for Wheezing. Rescue, Disp: 18 g, Rfl: 3    amLODIPine (NORVASC) 5 MG tablet, Take 1 tablet (5 mg total) by mouth once daily., Disp: 90 tablet, Rfl: 1    fluticasone propionate (FLONASE) 50 mcg/actuation nasal spray, 2 sprays (100 mcg total) by Each Nostril route once daily., Disp: 18.2 mL, Rfl: 8    pantoprazole (PROTONIX) 40 MG tablet, TAKE 1 TABLET BY MOUTH BEFORE BREAKFAST, Disp: 90 tablet, Rfl: 0    spironolactone (ALDACTONE) 100 MG tablet, Take 1 tablet (100 mg total) by mouth once daily., Disp: 30 tablet, Rfl: 5    tiZANidine (ZANAFLEX) 4 MG tablet, TAKE 1 TABLET BY MOUTH EVERY DAY AT BEDTIME AS NEEDED FOR BACK PAIN, Disp: 30 tablet, Rfl: 0    valACYclovir (VALTREX) 500 MG tablet, TAKE 1 TABLET BY MOUTH TWICE DAILY FOR 3 DAYS AS NEEDED FOR  FLARE (Patient taking differently: Take 500 mg by mouth 2 (two) times daily. TAKE 1 TABLET BY MOUTH TWICE DAILY FOR 3 DAYS AS NEEDED FOR  FLARE), Disp: 60 tablet, Rfl: 0    ciclopirox (PENLAC) 8 % Soln, Apply topically nightly. Apply to affected  toenail(s) and adjacent skin once daily in combination with weekly nail trimming and periodic nail debridement. Remove with alcohol every 7 days; continue therapy until nail clearance (max duration 48wks)  (Patient not taking: Reported on 1/3/2024), Disp: 6.6 mL, Rfl: 2    clotrimazole-betamethasone 1-0.05% (LOTRISONE) cream, Apply topically 2 (two) times daily. PRN itch (Patient not taking: Reported on 1/3/2024), Disp: 45 g, Rfl: 1    fluconazole (DIFLUCAN) 150 MG Tab, Take 1 tablet (150 mg total) by mouth once daily. (Patient not taking: Reported on 1/3/2024), Disp: 1 tablet, Rfl: 1    linaCLOtide (LINZESS) 145 mcg Cap capsule, Take 1 capsule (145 mcg total) by mouth before breakfast. (Patient not taking: Reported on 1/3/2024), Disp: 90 capsule, Rfl: 0    medroxyPROGESTERone (PROVERA) 5 MG tablet, Take 1 tablet (5 mg total) by mouth 2 (two) times a day. for 10 days, Disp: 20 tablet, Rfl: 0    tretinoin (RETIN-A) 0.05 % cream, Apply topically every evening. (Patient not taking: Reported on 1/3/2024), Disp: 45 g, Rfl: 5     Social History     Tobacco Use    Smoking status: Never     Passive exposure: Never    Smokeless tobacco: Never   Substance Use Topics    Alcohol use: Yes     Comment: Special occasions    Drug use: Never       Family History   Problem Relation Age of Onset    COPD Mother     Coronary artery disease Mother         s/p CABG    Breast cancer Sister 42    Hypertension Maternal Grandmother     Diabetes Maternal Grandmother     Colon cancer Neg Hx     Ovarian cancer Neg Hx     Celiac disease Neg Hx     Cirrhosis Neg Hx     Crohn's disease Neg Hx     Esophageal cancer Neg Hx     Inflammatory bowel disease Neg Hx     Liver cancer Neg Hx     Liver disease Neg Hx     Rectal cancer Neg Hx     Stomach cancer Neg Hx     Ulcerative colitis Neg Hx     Pancreatic cancer Neg Hx     Kidney cancer Neg Hx     Bladder Cancer Neg Hx     Uterine cancer Neg Hx        Review of Systems (at today's evaluation)  Review of Systems   Constitutional:  Negative for fever and unexpected weight change.   Respiratory: Negative.     Cardiovascular:  Negative for chest pain and palpitations.   Gastrointestinal:  Negative for nausea  and vomiting.   Genitourinary:  Negative for dysuria, hematuria and urgency.        Gyn as per HPI   Neurological:  Negative for headaches.        Objective:      Vitals:    01/03/24 1419   BP: 132/82   Pulse: 76     Physical Exam:   Constitutional: She appears well-developed and well-nourished.    HENT:   Head: Normocephalic.     Neck: No thyroid mass present.    Cardiovascular:  Normal rate.             Pulmonary/Chest: Effort normal. No respiratory distress.        Abdominal: Soft. There is no abdominal tenderness.     Genitourinary:    Inguinal canal, uterus, right adnexa and left adnexa normal.      Pelvic exam was performed with patient supine.   The external female genitalia was normal.   No external genitalia lesions identified,Cervix is normal. Right adnexum displays no mass and no tenderness. Left adnexum displays no mass and no tenderness. There is erythema and vaginal discharge (yeast visualized) in the vagina. No tenderness or bleeding in the vagina. Uterus is not tender. Normal urethral meatus.Urethra findings: no tendernessBladder findings: no bladder tenderness          Musculoskeletal: Normal range of motion.      Right lower leg: No edema.      Left lower leg: No edema.      Lymphadenopathy:     She has no cervical adenopathy. No inguinal adenopathy noted on the right or left side.    Neurological: She is alert.    Skin: Skin is warm and dry.    Psychiatric: Mood normal.         Recent Laboratory Results:    11/03/2023:  Chlamydia and gonorrhea negative.  Vaginitis panel positive for yeast, otherwise negative.    Recent Radiology Results:    12/1/2023 Routine     Narrative & Impression  EXAMINATION:  US PELVIS COMP WITH TRANSVAG NON-OB (XPD)     CLINICAL HISTORY:  Pelvic and perineal pain    FINDINGS:  Uterus:     Size: 9.1 x 4.7 x 5.6 cm cm     Masses: 1.3 x 1.2 x 3 cm slightly hyperechoic focus in the fundus suggesting uterine fibroid and appearing to represent the same finding that was seen on  the prior exam 11/05/2021 where was measured at 1.3 x 1.2 x 1.2 cm.     Endometrium: Appears thick measuring 1.4 cm.  Patient's menopausal status not noted on the tech worksheet.     Right ovary:     Size: 2.1 x 1.6 x 1.8 cm     Appearance: 1.4 cm right ovarian cyst.     Vascular flow: Right ovarian flow as visualized appears normal.  Some limited visualization..     Left ovary:     Size: 4.2 x 1.8 x 3.4 cm     Appearance: Elongated cystic structure measuring approximately 3 x 3 x 0.8 cm likely ovarian cyst.     Vascular Flow: Left ovarian flow as visualized appears normal.     Free Fluid:     None.     Impression:     Uterine fibroid and bilateral ovarian cysts.     The endometrium appears thick measured at 1.4 cm.      Assessment:        1. Abnormal uterine bleeding (AUB)      Plan:      Abnormal uterine bleeding (AUB)  -     medroxyPROGESTERone (PROVERA) 5 MG tablet; Take 1 tablet (5 mg total) by mouth 2 (two) times a day. for 10 days  Dispense: 20 tablet; Refill: 0  -     US Pelvis Comp with Transvag NON-OB (xpd; Future; Expected date: 01/10/2024      Follow up for ASAP after recommended testing obtained, Follow-up on today's evaluation.     The above was reviewed discussed with the patient.    We discussed the findings of uterine myoma as well as a somewhat thickened endometrial cavity.  Conservative, medical and surgical intervention in regards to the above were discussed.      At this time the patient would like to proceed from a conservative standpoint.  We will treat initially with medroxyprogesterone following which we will repeat the ultrasound.  We discussed the fact that should a continued abnormally thickened endometrial cavity be found further evaluation of the cavity by either D&C or office endometrial biopsy is warranted.    The patient's questions were answered, and she is in agreement with the current plan.     Ubaldo Gonzalez MD  Department OBGYN [facility]

## 2024-01-10 ENCOUNTER — OFFICE VISIT (OUTPATIENT)
Dept: PRIMARY CARE CLINIC | Facility: CLINIC | Age: 49
End: 2024-01-10
Payer: COMMERCIAL

## 2024-01-10 ENCOUNTER — LAB VISIT (OUTPATIENT)
Dept: LAB | Facility: HOSPITAL | Age: 49
End: 2024-01-10
Attending: INTERNAL MEDICINE
Payer: COMMERCIAL

## 2024-01-10 VITALS
HEIGHT: 67 IN | OXYGEN SATURATION: 98 % | TEMPERATURE: 98 F | HEART RATE: 82 BPM | WEIGHT: 201.94 LBS | RESPIRATION RATE: 16 BRPM | SYSTOLIC BLOOD PRESSURE: 126 MMHG | DIASTOLIC BLOOD PRESSURE: 80 MMHG | BODY MASS INDEX: 31.7 KG/M2

## 2024-01-10 DIAGNOSIS — L65.9 HAIR LOSS: ICD-10-CM

## 2024-01-10 DIAGNOSIS — J45.20 MILD INTERMITTENT ASTHMA WITHOUT COMPLICATION: ICD-10-CM

## 2024-01-10 DIAGNOSIS — E87.6 HYPOKALEMIA: ICD-10-CM

## 2024-01-10 DIAGNOSIS — I10 HYPERTENSION, UNSPECIFIED TYPE: Primary | ICD-10-CM

## 2024-01-10 DIAGNOSIS — K59.00 CONSTIPATION, UNSPECIFIED CONSTIPATION TYPE: ICD-10-CM

## 2024-01-10 DIAGNOSIS — L60.3 BRITTLE NAILS: ICD-10-CM

## 2024-01-10 DIAGNOSIS — R31.29 MICROSCOPIC HEMATURIA: ICD-10-CM

## 2024-01-10 DIAGNOSIS — E66.09 CLASS 1 OBESITY DUE TO EXCESS CALORIES WITH SERIOUS COMORBIDITY AND BODY MASS INDEX (BMI) OF 31.0 TO 31.9 IN ADULT: ICD-10-CM

## 2024-01-10 DIAGNOSIS — Z86.39 HISTORY OF LOW POTASSIUM: ICD-10-CM

## 2024-01-10 PROBLEM — K43.2 INCISIONAL HERNIA, WITHOUT OBSTRUCTION OR GANGRENE: Status: RESOLVED | Noted: 2022-09-22 | Resolved: 2024-01-10

## 2024-01-10 LAB
BACTERIA #/AREA URNS AUTO: NORMAL /HPF
MICROSCOPIC COMMENT: NORMAL
RBC #/AREA URNS AUTO: 4 /HPF (ref 0–4)
SQUAMOUS #/AREA URNS AUTO: 19 /HPF
WBC #/AREA URNS AUTO: 3 /HPF (ref 0–5)

## 2024-01-10 PROCEDURE — 36415 COLL VENOUS BLD VENIPUNCTURE: CPT | Mod: PN | Performed by: INTERNAL MEDICINE

## 2024-01-10 PROCEDURE — 3008F BODY MASS INDEX DOCD: CPT | Mod: CPTII,S$GLB,, | Performed by: INTERNAL MEDICINE

## 2024-01-10 PROCEDURE — 99396 PREV VISIT EST AGE 40-64: CPT | Mod: S$GLB,,, | Performed by: INTERNAL MEDICINE

## 2024-01-10 PROCEDURE — 1160F RVW MEDS BY RX/DR IN RCRD: CPT | Mod: CPTII,S$GLB,, | Performed by: INTERNAL MEDICINE

## 2024-01-10 PROCEDURE — 84443 ASSAY THYROID STIM HORMONE: CPT | Performed by: INTERNAL MEDICINE

## 2024-01-10 PROCEDURE — 3079F DIAST BP 80-89 MM HG: CPT | Mod: CPTII,S$GLB,, | Performed by: INTERNAL MEDICINE

## 2024-01-10 PROCEDURE — 99999 PR PBB SHADOW E&M-EST. PATIENT-LVL IV: CPT | Mod: PBBFAC,,, | Performed by: INTERNAL MEDICINE

## 2024-01-10 PROCEDURE — 3074F SYST BP LT 130 MM HG: CPT | Mod: CPTII,S$GLB,, | Performed by: INTERNAL MEDICINE

## 2024-01-10 PROCEDURE — 83735 ASSAY OF MAGNESIUM: CPT | Performed by: INTERNAL MEDICINE

## 2024-01-10 PROCEDURE — 81001 URINALYSIS AUTO W/SCOPE: CPT | Performed by: INTERNAL MEDICINE

## 2024-01-10 PROCEDURE — 1159F MED LIST DOCD IN RCRD: CPT | Mod: CPTII,S$GLB,, | Performed by: INTERNAL MEDICINE

## 2024-01-10 PROCEDURE — 80048 BASIC METABOLIC PNL TOTAL CA: CPT | Performed by: INTERNAL MEDICINE

## 2024-01-10 RX ORDER — SPIRONOLACTONE 25 MG/1
25 TABLET ORAL DAILY
Qty: 90 TABLET | Refills: 0 | Status: SHIPPED | OUTPATIENT
Start: 2024-01-10 | End: 2025-01-09

## 2024-01-10 RX ORDER — PANTOPRAZOLE SODIUM 40 MG/1
40 TABLET, DELAYED RELEASE ORAL
Qty: 90 TABLET | Refills: 0 | Status: SHIPPED | OUTPATIENT
Start: 2024-01-10

## 2024-01-10 RX ORDER — AMLODIPINE BESYLATE 5 MG/1
5 TABLET ORAL DAILY
Qty: 90 TABLET | Refills: 1 | Status: SHIPPED | OUTPATIENT
Start: 2024-01-10 | End: 2025-01-09

## 2024-01-10 NOTE — PROGRESS NOTES
[facility]    Chief Complaint      Chief Complaint   Patient presents with    Annual Exam       History of Present Illness      Ilene Garber is a 48 y.o.  AAF with HTN, Hypokalemia, GERD, Mild Intermittent Asthma, Dysmnenorrhea, RT Rotator cuff tear presents to fu Hosp visit. Last virtual visit - 9/15/23    Hematuria > - check urine micro. Pt not on cycle today.      Lip fasciculation - She showed me a video of a mild lip fasciculation.  No other Sx's. She will alert me for any other Sx's or if this worsens. Suspect likely a benign fasciculation. Started this wk. No numbness/weakness.  No fasciculations anywhere else. Will check lytes.     AUB/Dysmenorrhea - Fu by OB. Pelvic U/s - 12/1/23 - Uterine fibroid and amna ovarian cysts. Thick endometrium 1.4 cm was given medroxyprogesterone and if persists will need EMB or D&C.      Hypokalemia - 3.3. Pt started high K diet. Due for repeat- has order.  Cont to monitor K+. Pt off  aldactone. Repeat K+ and check Magnesium.     Constipation -  Started Linzess per Dr. Tate. She took for 1 wk and it worked too well. Would like lower dose or something else. Plans to d/w Dr. Tate.     Hosp -9/6/23 - 9/7/23 -  Abd pain x 3 days + Abd abscess/colitis. She was tx with flagyl,cipro,and Levaquin. Ultrasound-guided aspiration of anterior abdominal wall fluid collection as above yielding only 2 mL of serous fluid.  Peritoneal Cx - Anaerobic and aerobic both neg.  Feeling better since completing abx.  Fu by Dr. Thomason.     H/o gastritis - EGD 1/19/22- H. pylori gastritis Mild active proctitis. She was tx with Quad Tx. Repeat EGD 7/12/22 - 1 cm hiatal hernia.  Pt on Protonix daily. She is being fu by GI, Dr. Tate.   Avoids NSAIDs.      H/o gallstones - CT abd 11/30/21 - Nitrogen containing cholelithiasis with small volume of nonspecific pericholecystic fluid.  No evidence of wall thickening or pericholecystic fat stranding. If there is clinical  "concern for cholecystitis, recommend HIDA scan. Few scattered prominent mesenteric lymph nodes without evidence of pathologic lymphadenopathy.GI referred to general surgery for evaluation of postprandial abdominal pain in gallstone seen on CT scan.  Pt is s/p lap delilah 2/7/22 with Dr. Thomason.     Iman umbilical hernia s/p repair - 9/22/22 -Doing well.      Iron def - Pt was iron deficient but not anemic and Dr. Tate had recommend that she take ferrous gluconate one 324mg pill once daily when on her cycle. She has not been on it. Resolved. Iron studies are normal.       Vit D def -  Resolved. Vit D level which was normal at 47. Pt completed Ergo per Dr. Tate 50,000 units ONCE A WEEK (every 7-days) for 12 wks and is now on Vitamin D3 4,000 units/d OTC.     Low HDL - Her Cholesterol looks ok but her HDL (good Cholesterol) is low.  I recommend exercise to increase this.  Repeat FLP next visit.      HSV genital - Pt on Valtrex prn.      HTN - We recently switched her from HCTZ to Spironolactone 25 mg/d.  She is doing well. Her potassium improved. She is now on amlodipine 5 mg/d since 9/26/22.  Derm inc her aldactone from 25 mg to 50 and then 100 mg for tx of her acne. She stopped the aldactone. Doing well. No dizziness. Cont to monitor K+. Edema returned. She started taking her old HCTZ x 2 days. Will Stop this and resume a low dose of aldactone at 25 mg/d.       GERD- Pt on Protonix daily. Fu by GI, Dr. Tate.      RT shoulder pain - She was injured at work. She tore her rotator cuff.  "It comes and goes but has been ok lately". Avoid NSAIDS. She takes Tylenol prn, heating pad prn, voltaren gel prn. Xanaflex prn. she does some Ptx.      Mild intermittent Asthma - Allergies/weather change are a trigger.  Typically only needs Proair 5 times/yr.   Allergies - trigger. She has had flares with weather change. Zyrtec and flonase prn help.      Acne - Pt is off Aldactone 100 mg/d. Will resume 25 mg dose.  Repeat " K+.      Obesity - BMI - 31.63 up 4lb since last visit. Rec exercise every other day. Rec low carb diet.     HCM - Flu - admin 1/10/24;  Tdap - 7/10/23;  PCV 13 - ?;  PVX 23 - ?;  Shingrix - due at 52 y.o.;  RSV - due at 62 y.o.; COVID -19 Vaccine - (Moderna) # 1 - 2/10/21 and #2 - 3/11/21;  MGM - 23 -cyst -  repeat 1 yr;  PAP - - neg.;  hep C screen - 21 - neg.HIV Screen -21 -neg. ; C-scope - 22 - diverticulosis, hemorrhoids, erythema - c/w mild active proctitis- repeat 10 yrs; EGD - 22 - Moderate chronic antral and oxyntic gastritis with mild activity Prev PCP - Dr. Rory Cooper; Ob/GYN - Dr. Dey;  GI - Dr. Tate; Well visit - 23    Patient Care Team:  Uma Henry MD as PCP - General (Internal Medicine)  Anderson Dey Jr., MD as Obstetrician (Obstetrics)  Ayala Sharma MA as Care Coordinator     Health Maintenance:  Immunization History   Administered Date(s) Administered    COVID-19, MRNA, LN-S, PF (MODERNA FULL 0.5 ML DOSE) 02/10/2021, 2021    Influenza - Quadrivalent - MDCK - PF 2021, 2023    Tdap 07/10/2023      Health Maintenance   Topic Date Due    Mammogram  2024    Lipid Panel  01/10/2028    Colorectal Cancer Screening  2032    TETANUS VACCINE  07/10/2033    Hepatitis C Screening  Completed        Past Medical History:  Past Medical History:   Diagnosis Date    Asthma     GERD (gastroesophageal reflux disease)     H. pylori infection     Hypertension     Ovarian cyst        Past Surgical History:   has a past surgical history that includes  section; LAPAROSCOPY LYSIS OF ADHESIONS (2018); Bilateral tubal ligation; Esophagogastroduodenoscopy (N/A, 2022); Colonoscopy (N/A, 2022); Laparoscopic cholecystectomy (N/A, 2022); Breast biopsy (Left, ); Esophagogastroduodenoscopy (N/A, 2022); Robot-assisted repair of incisional hernia using da Vamshi Xi (N/A, 2022); Tubal  "ligation; Hernia repair (9/22/2022); and Cholecystectomy (2/7/2022).    Family History:  family history includes Breast cancer (age of onset: 42) in her sister; COPD in her mother; Coronary artery disease in her mother; Diabetes in her maternal grandmother; Hypertension in her maternal grandmother.     Social History:  Social History     Tobacco Use    Smoking status: Never     Passive exposure: Never    Smokeless tobacco: Never   Substance Use Topics    Alcohol use: Yes     Comment: Special occasions    Drug use: Never       Review of Systems   Constitutional:  Negative for chills, diaphoresis and fever.   HENT:  Positive for ear pain and hearing loss. Negative for tinnitus.         RT ear.    Eyes:  Negative for visual disturbance.        Wears glasses   Respiratory:  Negative for chest tightness and shortness of breath.    Cardiovascular:  Negative for chest pain and palpitations.   Gastrointestinal:  Positive for constipation. Negative for abdominal pain, blood in stool, diarrhea, nausea and vomiting.   Endocrine: Negative for cold intolerance, heat intolerance and polydipsia.   Genitourinary:  Positive for menstrual irregularity. Negative for bladder incontinence, dysuria, frequency and hematuria.        Having a cycle 2/month.    Musculoskeletal:  Positive for back pain. Negative for arthralgias and myalgias.   Neurological:  Negative for dizziness and headaches.        Lip fasciculation - a few times/d x 1 wk.    Psychiatric/Behavioral:  Negative for dysphoric mood. The patient is nervous/anxious.         "I have a stressful job".         Medications:    Current Outpatient Medications:     albuterol (PROVENTIL) 5 mg/mL nebulizer solution, Inhale 2 puffs into the lungs every 4 (four) hours as needed for Shortness of Breath., Disp: , Rfl:     albuterol (VENTOLIN HFA) 90 mcg/actuation inhaler, Inhale 2 puffs into the lungs every 6 (six) hours as needed for Wheezing. Rescue, Disp: 18 g, Rfl: 3    ciclopirox " "(PENLAC) 8 % Soln, Apply topically nightly. Apply to affected  toenail(s) and adjacent skin once daily in combination with weekly nail trimming and periodic nail debridement. Remove with alcohol every 7 days; continue therapy until nail clearance (max duration 48wks), Disp: 6.6 mL, Rfl: 2    clotrimazole-betamethasone 1-0.05% (LOTRISONE) cream, Apply topically 2 (two) times daily. PRN itch, Disp: 45 g, Rfl: 1    fluticasone propionate (FLONASE) 50 mcg/actuation nasal spray, 2 sprays (100 mcg total) by Each Nostril route once daily., Disp: 18.2 mL, Rfl: 8    medroxyPROGESTERone (PROVERA) 5 MG tablet, Take 1 tablet (5 mg total) by mouth 2 (two) times a day. for 10 days, Disp: 20 tablet, Rfl: 0    pantoprazole (PROTONIX) 40 MG tablet, TAKE 1 TABLET BY MOUTH BEFORE BREAKFAST, Disp: 90 tablet, Rfl: 0    tiZANidine (ZANAFLEX) 4 MG tablet, TAKE 1 TABLET BY MOUTH EVERY DAY AT BEDTIME AS NEEDED FOR BACK PAIN, Disp: 30 tablet, Rfl: 0    tretinoin (RETIN-A) 0.05 % cream, Apply topically every evening., Disp: 45 g, Rfl: 5    valACYclovir (VALTREX) 500 MG tablet, TAKE 1 TABLET BY MOUTH TWICE DAILY FOR 3 DAYS AS NEEDED FOR  FLARE (Patient taking differently: Take 500 mg by mouth 2 (two) times daily. TAKE 1 TABLET BY MOUTH TWICE DAILY FOR 3 DAYS AS NEEDED FOR  FLARE), Disp: 60 tablet, Rfl: 0    amLODIPine (NORVASC) 5 MG tablet, Take 1 tablet (5 mg total) by mouth once daily., Disp: 90 tablet, Rfl: 1    spironolactone (ALDACTONE) 25 MG tablet, Take 1 tablet (25 mg total) by mouth once daily., Disp: 90 tablet, Rfl: 0     Allergies:  Review of patient's allergies indicates:  No Known Allergies    Physical Exam      Vital Signs  Temp: 98.2 °F (36.8 °C)  Temp Source: Oral  Pulse: 82  Resp: 16  SpO2: 98 %  BP: 126/80  BP Location: Right arm  Patient Position: Sitting  Height and Weight  Height: 5' 7" (170.2 cm)  Weight: 91.6 kg (201 lb 15.1 oz)  BSA (Calculated - sq m): 2.08 sq meters  BMI (Calculated): 31.6  Weight in (lb) to have BMI " = 25: 159.3      Patient Position: Sitting      Physical Exam  Vitals reviewed.   Constitutional:       General: She is not in acute distress.     Appearance: Normal appearance. She is not ill-appearing, toxic-appearing or diaphoretic.   HENT:      Head: Normocephalic and atraumatic.      Right Ear: Tympanic membrane normal.      Left Ear: Tympanic membrane normal.      Mouth/Throat:      Mouth: Mucous membranes are moist.      Pharynx: No posterior oropharyngeal erythema.   Eyes:      Extraocular Movements: Extraocular movements intact.      Conjunctiva/sclera: Conjunctivae normal.      Pupils: Pupils are equal, round, and reactive to light.   Neck:      Vascular: No carotid bruit.   Cardiovascular:      Rate and Rhythm: Normal rate and regular rhythm.      Pulses: Normal pulses.      Heart sounds: Normal heart sounds.   Pulmonary:      Effort: Pulmonary effort is normal. No respiratory distress.      Breath sounds: Normal breath sounds.   Abdominal:      General: Bowel sounds are normal. There is no distension.      Palpations: Abdomen is soft.      Tenderness: There is no abdominal tenderness. There is no guarding or rebound.   Musculoskeletal:      Cervical back: Neck supple. No tenderness.   Neurological:      General: No focal deficit present.      Mental Status: She is alert and oriented to person, place, and time.   Psychiatric:         Mood and Affect: Mood normal.         Behavior: Behavior normal.          Laboratory:  CBC:  Recent Labs   Lab 09/06/23  1847 09/07/23  0555 09/29/23  1540 11/03/23  1635   WBC 8.64 7.49 7.48  --    RBC 4.26 4.05 4.16  --    Hemoglobin 12.5 11.7 L 11.7 L 12.2   Hematocrit 38.5 36.6 L 35.8 L  --    Platelets 264 246 325  --    MCV 90 90 86  --    MCH 29.3 28.9 28.1  --    MCHC 32.5 32.0 32.7  --        CMP:  Recent Labs   Lab 07/10/23  1535 09/06/23  1847 09/07/23  0555 11/03/23  1635   Glucose 78 89   < > 82   Calcium 9.2 8.9   < > 9.4   Albumin 4.0 4.2  --  4.3   Total  Protein 7.7 8.2  --  8.3   Sodium 137 133 L   < > 139   Potassium 3.4 L 3.5   < > 3.3 L   CO2 21 L 24   < > 21 L   Chloride 106 103   < > 105   BUN 9 12   < > 11   Creatinine 0.9 0.9   < > 1.0   Alkaline Phosphatase 62 56  --  64   ALT 17 19  --  18   AST 20 23  --  20   Total Bilirubin 0.5 0.7  --  0.4    < > = values in this interval not displayed.           URINALYSIS:  Recent Labs   Lab 09/06/23  1853 09/15/23  1143   Color, UA Yellow Yellow   Specific Gravity, UA 1.020 1.025   pH, UA 7.0 6.0   Protein, UA Trace A Trace A   Bacteria Negative  --    Nitrite, UA Negative Negative   Leukocytes, UA 3+ A Negative   Urobilinogen, UA 2.0-3.0 A  --    Hyaline Casts, UA 11 A  --         LIPIDS:  Recent Labs   Lab 01/03/22  0900 01/10/23  0825   TSH 1.510 1.316   HDL 35 L 41   Cholesterol 151 158   Triglycerides 93 76   LDL Cholesterol 97.4 101.8   HDL/Cholesterol Ratio 23.2 25.9   Non-HDL Cholesterol 116 117   Total Cholesterol/HDL Ratio 4.3 3.9       TSH:  Recent Labs   Lab 01/03/22  0900 01/10/23  0825   TSH 1.510 1.316          Other:   Recent Labs   Lab 11/05/21  1415 01/03/22  0900 05/20/22  1445 11/14/22  0807 07/10/23  1535 09/29/23  1540 11/03/23  1635   Follicle Stimulating Hormone  --   --   --   --   --  8.67  --    Estradiol  --   --   --   --   --  167  --    Vit D, 25-Hydroxy 10 L  --  24 L 31  --   --   --    Vitamin B-12 253  --   --   --   --   --   --    Ferritin 51   < >  --   --    < >  --  75   Iron 69   < >  --   --    < >  --  79   Transferrin 278   < >  --   --    < >  --  266   TIBC 411   < >  --   --    < >  --  394   Saturated Iron 17 L   < >  --   --    < >  --  20    < > = values in this interval not displayed.     Recent Labs   Lab 11/03/23  1635   Hepatitis C Ab Non-reactive       Assessment/Plan     Ilene Garber is a 48 y.o.female with:    Hypertension, unspecified type  -     amLODIPine (NORVASC) 5 MG tablet; Take 1 tablet (5 mg total) by mouth once daily.  Dispense: 90 tablet;  Refill: 1  - Controlled.  Cont current.     Hypokalemia  -     Magnesium; Future; Expected date: 01/10/2024  -     spironolactone (ALDACTONE) 25 MG tablet; Take 1 tablet (25 mg total) by mouth once daily.  Dispense: 90 tablet; Refill: 0  - Stop HCTz.  Resume aldactone at 25 mg/d.  Repeat BMP and check Mg.     Microscopic hematuria  -     Urinalysis Microscopic  - Check urine microscopy for blood.     Brittle nails  -     TSH; Future; Expected date: 01/10/2024  - check TSH.     Hair loss  -     TSH; Future; Expected date: 01/10/2024  - Check TSH.     Mild intermittent asthma without complication  - Stable.  Cont current regimen.    Class 1 obesity due to excess calories with serious comorbidity and body mass index (BMI) of 31.0 to 31.9 in adult    Constipation, unspecified constipation type   - Linzess caused too much stooling and was too expensive.  Due for fu with GI. Plans to d/w dr. Tate.     Chronic conditions status updated as per HPI.  Other than changes above, cont current medications and maintain follow up with specialists.  Follow up in about 6 months (around 7/10/2024) for well visit or sooner if needed.      Uma Henry MD  [facility]

## 2024-01-11 ENCOUNTER — PATIENT MESSAGE (OUTPATIENT)
Dept: GASTROENTEROLOGY | Facility: CLINIC | Age: 49
End: 2024-01-11
Payer: COMMERCIAL

## 2024-01-11 ENCOUNTER — TELEPHONE (OUTPATIENT)
Dept: GASTROENTEROLOGY | Facility: CLINIC | Age: 49
End: 2024-01-11
Payer: COMMERCIAL

## 2024-01-11 DIAGNOSIS — E87.6 LOW SERUM POTASSIUM: Primary | ICD-10-CM

## 2024-01-11 LAB
ANION GAP SERPL CALC-SCNC: 8 MMOL/L (ref 8–16)
BUN SERPL-MCNC: 9 MG/DL (ref 6–20)
CALCIUM SERPL-MCNC: 9.3 MG/DL (ref 8.7–10.5)
CHLORIDE SERPL-SCNC: 107 MMOL/L (ref 95–110)
CO2 SERPL-SCNC: 23 MMOL/L (ref 23–29)
CREAT SERPL-MCNC: 0.9 MG/DL (ref 0.5–1.4)
EST. GFR  (NO RACE VARIABLE): >60 ML/MIN/1.73 M^2
GLUCOSE SERPL-MCNC: 86 MG/DL (ref 70–110)
MAGNESIUM SERPL-MCNC: 1.9 MG/DL (ref 1.6–2.6)
POTASSIUM SERPL-SCNC: 3.2 MMOL/L (ref 3.5–5.1)
SODIUM SERPL-SCNC: 138 MMOL/L (ref 136–145)
TSH SERPL DL<=0.005 MIU/L-ACNC: 1.11 UIU/ML (ref 0.4–4)

## 2024-01-11 NOTE — PROGRESS NOTES
Nisa please tell Ilene that her serum potassium level is low and let us repeat that level next Amanda with basic metabolic panel orders placed    Please recommend she increase the amount of foods that are higher in potassium below is a list.    Nisa please recommend she follow-up with her primary care doctor follow-up this and management.    Thank    Food Sources of Potassium      Bananas, oranges, cantaloupe, honeydew, apricots, grapefruit (some dried fruits, such as prunes, raisins, and dates, are also high in potassium)  Cooked spinach.  Cooked broccoli.  Potatoes.  Sweet potatoes.  Mushrooms.  Peas.  Cucumbers.

## 2024-01-12 NOTE — PROGRESS NOTES
I sent pt a my chart message -  I reviewed your labs.  Your Potassium was low at 3.2. I think the medications adjustments we made should help with this.  We will see what your repeat Potassium looks like.  In the meantime I rec a high potassium diet (bananas, oranges, spinach, potato, mushrooms, peas, sweet potatoes). Your magnesium level was normal and your thyroid function was normal.  Dr. CONTRERAS

## 2024-01-12 NOTE — TELEPHONE ENCOUNTER
----- Message from Cirilo Tate MD sent at 1/11/2024  4:59 PM CST -----  Nisa please tell Ilene that her serum potassium level is low and let us repeat that level next Amanda with basic metabolic panel orders placed    Please recommend she increase the amount of foods that are higher in potassium below is a list.    Nisa please recommend she follow-up with her primary care doctor follow-up this and management.    Thank    Food Sources of Potassium      Bananas, oranges, cantaloupe, honeydew, apricots, grapefruit (some dried fruits, such as prunes, raisins, and dates, are also high in potassium)  Cooked spinach.  Cooked broccoli.  Potatoes.  Sweet potatoes.  Mushrooms.  Peas.  Cucumbers.

## 2024-01-19 NOTE — PROGRESS NOTES
Please inform pt - There was no significant blood in her urine as there is not an abnormal number of red blood cells (RBC) in your urine.  No interventions necessary.  Dr. CONTRERAS

## 2024-01-22 ENCOUNTER — TELEPHONE (OUTPATIENT)
Dept: PRIMARY CARE CLINIC | Facility: CLINIC | Age: 49
End: 2024-01-22
Payer: COMMERCIAL

## 2024-01-22 NOTE — TELEPHONE ENCOUNTER
----- Message from Katelyn Hare sent at 1/22/2024  3:11 PM CST -----  Contact: Ilene Garrison   Patient is returning a phone call.  Who left a message for the patient: Not sure    Does patient know what this is regarding: results    Would you like a call back, or a response through your [facility] portal?: call back   Comments:

## 2024-02-26 NOTE — DISCHARGE SUMMARY
"[facility]  Discharge Summary  Patient Name: Ilene Garber MRN: [id number]   Patient Class: OP- Observation  Length of Stay: 0   Admission Date: 9/6/2023  6:05 PM Attending Physician: Harjeet Laurent MD   Primary Care Provider: Uma Henry MD Face-to-Face encounter date: 9/7/23   Chief Complaint: Abdominal Pain (Pt states " my stomach is killing me" c/o lower abd pain, pt states " I had a hernia repair about a year and half ago"" it feels like it's sticking out again" symptoms since Monday night )    Date of Discharge: 9/7/23  Discharge Disposition:Home or Self Care   Condition: Stable       Reason for Hospitalization   There are no active hospital problems to display for this patient.        Brief History of Present Illness    48 year old female with history of HTN, Asthma, GERD, s/p cholecystectomy, s/p hernia repair presented to ED complaining of 4 day history of progressively worsening central (umbilical area) abdominal pain, currently 8-9/10, sharp, aching, waxing and waning, but progressively worsening, no rads. Feels nauseous, but no vomiting. Is chronically constipated, but had "Good" BM today. Had mesh placed approximately 1 year ago for hernia.     In ED: labs reviewed and noted fully below: CBC normal; trivial hyponatremia with normal renal and hepatic function. UPT: negative.  CT Abdo/Pelvis:  "IMPRESSION:  1.  Within the anterior abdominal wall just deep to the umbilicus, there is a fluid collection interposed between the right and left rectus abdominis, abutting the external margin of the peritoneum, measuring approximately 4.4 x 2.0 x 2.6 cm, with mild peripheral enhancement. This could potentially represent hematoma or abscess.  2.  Mild wall thickening along the descending colon, which could in part be artifactual related to incomplete distention, though colitis is not excluded.  3.  Moderate feces within the ascending and transverse colon, and constipation is not " "excluded.  4.  Mild free fluid within the pelvis and minimally within the right paracolic gutter, of uncertain etiology, possibly in part physiologic fluid.        Hospital Course By Problem with Pertinent Findings     Abdominal hematoma  General Surgery: npo p midnight-except meds with sips; continue home regimen as per admission med rec listed above; received a dose of levofloxacin in ED; AM labs for revie    Surgery consult  Discussed with patient.  Clinically appears to be feeling much better.  Benign abdominal exam.  Okay to start diet and DC home when cleared by hospitalist.    Ok for discharge, follow up with pcp    Patient was seen and examined on the date of discharge and determined to be suitable for discharge.    Physical Exam  /68   Pulse 78   Temp 97.8 °F (36.6 °C)   Resp 18   Ht 5' 7" (1.702 m)   Wt 90.6 kg (199 lb 11.8 oz)   LMP 08/26/2023 (Approximate)   SpO2 99%   Breastfeeding No   BMI 31.28 kg/m²   Vitals reviewed.    Constitutional: No distress.   HENT: Atraumatic.   Cardiovascular: Normal rate, regular rhythm.  S1 S2.    Pulmonary/Chest: Effort normal. Clear to auscultation bilaterally. No wheezes.   Abdominal: Soft. Bowel sounds are normal. Exhibits no distension and no mass. No tenderness  Neurological: Alert.   Skin: Skin is warm and dry.     Following labs were Reviewed   No results for input(s): "WBC", "HGB", "HCT", "PLT", "GLUCOSE", "CALCIUM", "ALBUMIN", "PROT", "NA", "K", "CO2", "CL", "BUN", "CREATININE", "ALKPHOS", "ALT", "AST", "BILITOT" in the last 24 hours.  No results found for: "POCTGLUCOSE"     All labs within the past 24 hours have been reviewed    Microbiology Results (last 7 days)       ** No results found for the last 168 hours. **          US Guided Abscess Cyst Aspiration   Final Result      CT Abdomen Pelvis With Contrast   Final Result          No results found for this or any previous visit.      Consultants and Procedures   Consultants:  Consults (From " admission, onward)          Status Ordering Provider     Inpatient consult to General Surgery  Once        Provider:  Wagner Freeman MD    Completed HENRIQUE REYES            Procedures:   * No surgery found *     Discharge Information:   Diet:  Resume Cardiac diet/Diabetic Diet    Physical Activity:  Activity as tolerated    Instructions:  1. Take all medications as prescribed  2. Keep all follow-up appointments  3. Return to the hospital or call your primary care physicians if any worsening symptoms such as chest pain, shortness of breath, bleeding,  intractable pain, fever >101 occur.      Follow-Up Appointments:  Please call your primary care physician to schedule an appointment in 1 week time.     Follow-up Information       Uma Henry MD Follow up in 1 week(s).    Specialty: Internal Medicine  Contact information:  [address] [facility] [address] [phone]                               Pending laboratory work/Tests to be performed/followed by the Primary care Physician:    The patient was discharged with discharge instructions reviewed in written and verbal form. All questions were answered and prescriptions were provided. The importance of making follow up appointments and compliance of medications has been emphasized. The patient will follow up in 1 week or sooner as needed with the PCP. Tthe patient understands and agrees with the plan. Upon discharge, patient needs to be on following medications.    Discharge Medications:     Medication List        CHANGE how you take these medications      pantoprazole 20 MG tablet  Commonly known as: PROTONIX  Take 1 tablet (20 mg total) by mouth before breakfast.  What changed: how much to take     valACYclovir 500 MG tablet  Commonly known as: VALTREX  TAKE 1 TABLET BY MOUTH TWICE DAILY FOR 3 DAYS AS NEEDED FOR  FLARE  What changed:   how much to take  how to take this  when to take this            CONTINUE taking these medications       albuterol 5 mg/mL nebulizer solution  Commonly known as: PROVENTIL     ciclopirox 8 % Soln  Commonly known as: PENLAC  Apply topically nightly. Apply to affected  toenail(s) and adjacent skin once daily in combination with weekly nail trimming and periodic nail debridement. Remove with alcohol every 7 days; continue therapy until nail clearance (max duration 48wks)     fluticasone propionate 50 mcg/actuation nasal spray  Commonly known as: FLONASE  2 sprays (100 mcg total) by Each Nostril route once daily.     tretinoin 0.05 % cream  Commonly known as: RETIN-A  Apply topically every evening.                I spent 22 minutes preparing the discharge for this patient including reviewing records from previous encounters, preparation of discharge summary, assessing and final examination of the patient, discharge medicine reconciliation, discussing plan of care, follow up and education and prescriptions.       Harjeet Laurent  [facility] Hospitalist

## 2024-03-22 ENCOUNTER — HOSPITAL ENCOUNTER (OUTPATIENT)
Dept: RADIOLOGY | Facility: HOSPITAL | Age: 49
Discharge: HOME | End: 2024-03-22
Attending: OBSTETRICS & GYNECOLOGY
Payer: COMMERCIAL

## 2024-03-22 DIAGNOSIS — N93.9 ABNORMAL UTERINE BLEEDING (AUB): ICD-10-CM

## 2024-03-22 PROCEDURE — 76830 TRANSVAGINAL US NON-OB: CPT | Mod: TC,PO

## 2024-03-22 PROCEDURE — 76856 US EXAM PELVIC COMPLETE: CPT | Mod: 26,,, | Performed by: RADIOLOGY

## 2024-03-22 PROCEDURE — 76830 TRANSVAGINAL US NON-OB: CPT | Mod: 26,,, | Performed by: RADIOLOGY

## 2024-03-28 ENCOUNTER — OFFICE VISIT (OUTPATIENT)
Dept: OBSTETRICS AND GYNECOLOGY | Facility: CLINIC | Age: 49
End: 2024-03-28
Payer: COMMERCIAL

## 2024-03-28 VITALS
HEIGHT: 67 IN | DIASTOLIC BLOOD PRESSURE: 66 MMHG | WEIGHT: 197.75 LBS | BODY MASS INDEX: 31.04 KG/M2 | SYSTOLIC BLOOD PRESSURE: 140 MMHG

## 2024-03-28 DIAGNOSIS — N93.9 ABNORMAL UTERINE BLEEDING (AUB): Primary | ICD-10-CM

## 2024-03-28 DIAGNOSIS — R10.2 PELVIC PAIN: ICD-10-CM

## 2024-03-28 PROCEDURE — 99213 OFFICE O/P EST LOW 20 MIN: CPT | Mod: S$GLB,,, | Performed by: OBSTETRICS & GYNECOLOGY

## 2024-03-28 PROCEDURE — 99999 PR PBB SHADOW E&M-EST. PATIENT-LVL III: CPT | Mod: PBBFAC,,, | Performed by: OBSTETRICS & GYNECOLOGY

## 2024-03-28 PROCEDURE — 3078F DIAST BP <80 MM HG: CPT | Mod: CPTII,S$GLB,, | Performed by: OBSTETRICS & GYNECOLOGY

## 2024-03-28 PROCEDURE — 1159F MED LIST DOCD IN RCRD: CPT | Mod: CPTII,S$GLB,, | Performed by: OBSTETRICS & GYNECOLOGY

## 2024-03-28 PROCEDURE — 3077F SYST BP >= 140 MM HG: CPT | Mod: CPTII,S$GLB,, | Performed by: OBSTETRICS & GYNECOLOGY

## 2024-03-28 PROCEDURE — 3008F BODY MASS INDEX DOCD: CPT | Mod: CPTII,S$GLB,, | Performed by: OBSTETRICS & GYNECOLOGY

## 2024-03-28 NOTE — PROGRESS NOTES
"    Subjective:   Chief Complaint:  Follow-up (U/s follow up)       Patient ID: Ilene Garber is a  48 y.o. female.    Contraception:  Bilateral tubal sterilization    Date: 1/3/2023    The patient presents today due to the following:  Since her last evaluation she has continued to have irregular menses.  Ultrasound was ordered and the patient presents today to discuss the results as well as further evaluation and potential treatment options.    Date: 3/28/2024    The patient presents today for follow-up.  She was last seen as above.  In light of her bleeding issues and findings of a thickened endometrial cavity the patient was placed on oral progesterone and elected a follow up ultrasound.    She presents today to discuss the results     Since her last evaluation she reports doing "very well".  No pelvic pain or abnormal bleeding issues.    GYN & OB History  Patient's last menstrual period was 03/15/2024 (approximate).     Date of Last Pap: 2023  OB History          3    Para   3    Term   3            AB        Living   3         SAB        IAB        Ectopic        Multiple        Live Births   3           Obstetric Comments    x 3               Allergies: Review of patient's allergies indicates:  No Known Allergies    Past Medical History:   Diagnosis Date    Asthma     GERD (gastroesophageal reflux disease)     H. pylori infection     Hypertension     Incisional hernia, without obstruction or gangrene 2022    Ovarian cyst        Past Surgical History:   Procedure Laterality Date    BILATERAL TUBAL LIGATION      BREAST BIOPSY Left     benign     SECTION      x 3    CHOLECYSTECTOMY  2022    COLONOSCOPY N/A 2022    Procedure: COLONOSCOPY;  Surgeon: Cirilo Tate MD;  Location: [address];  Service: Endoscopy;  Laterality: N/A;  Constipation bowel prep    ESOPHAGOGASTRODUODENOSCOPY N/A 2022    Procedure: EGD " (ESOPHAGOGASTRODUODENOSCOPY);  Surgeon: Cirilo Tate MD;  Location: [facility] ENDO (4TH FLR);  Service: Endoscopy;  Laterality: N/A;  COVID test at [place] on 1/16-GT    ESOPHAGOGASTRODUODENOSCOPY N/A 07/12/2022    Procedure: EGD (ESOPHAGOGASTRODUODENOSCOPY);  Surgeon: Cirilo Tate MD;  Location: [facility] ENDO (4TH FLR);  Service: Endoscopy;  Laterality: N/A;    HERNIA REPAIR  9/22/2022    LAPAROSCOPIC CHOLECYSTECTOMY N/A 02/07/2022    Procedure: CHOLECYSTECTOMY, LAPAROSCOPIC;  Surgeon: Chau Thomason Jr., MD;  Location: [facility] OR 2ND FLR;  Service: General;  Laterality: N/A;    LAPAROSCOPY LYSIS OF ADHESIONS  11/28/2018    Myomectomy    ROBOT-ASSISTED REPAIR OF INCISIONAL HERNIA USING DA LOUIE XI N/A 09/22/2022    Procedure: XI ROBOTIC REPAIR, HERNIA, INCISIONAL w/ mesh;  Surgeon: Chau Thomason Jr., MD;  Location: [facility] OR 2ND FLR;  Service: General;  Laterality: N/A;    TUBAL LIGATION         Medications    Current Outpatient Medications:     albuterol (PROVENTIL) 5 mg/mL nebulizer solution, Inhale 2 puffs into the lungs every 4 (four) hours as needed for Shortness of Breath., Disp: , Rfl:     albuterol (VENTOLIN HFA) 90 mcg/actuation inhaler, Inhale 2 puffs into the lungs every 6 (six) hours as needed for Wheezing. Rescue, Disp: 18 g, Rfl: 3    amLODIPine (NORVASC) 5 MG tablet, Take 1 tablet (5 mg total) by mouth once daily., Disp: 90 tablet, Rfl: 1    ciclopirox (PENLAC) 8 % Soln, Apply topically nightly. Apply to affected  toenail(s) and adjacent skin once daily in combination with weekly nail trimming and periodic nail debridement. Remove with alcohol every 7 days; continue therapy until nail clearance (max duration 48wks), Disp: 6.6 mL, Rfl: 2    clotrimazole-betamethasone 1-0.05% (LOTRISONE) cream, Apply topically 2 (two) times daily. PRN itch, Disp: 45 g, Rfl: 1    fluticasone propionate (FLONASE) 50 mcg/actuation nasal spray, 2 sprays (100 mcg total) by Each Nostril route once daily., Disp: 18.2  mL, Rfl: 8    pantoprazole (PROTONIX) 40 MG tablet, Take 1 tablet (40 mg total) by mouth before breakfast., Disp: 90 tablet, Rfl: 0    spironolactone (ALDACTONE) 25 MG tablet, Take 1 tablet (25 mg total) by mouth once daily., Disp: 90 tablet, Rfl: 0    tiZANidine (ZANAFLEX) 4 MG tablet, TAKE 1 TABLET BY MOUTH EVERY DAY AT BEDTIME AS NEEDED FOR BACK PAIN, Disp: 30 tablet, Rfl: 0    tretinoin (RETIN-A) 0.05 % cream, Apply topically every evening., Disp: 45 g, Rfl: 5    valACYclovir (VALTREX) 500 MG tablet, TAKE 1 TABLET BY MOUTH TWICE DAILY FOR 3 DAYS AS NEEDED FOR  FLARE (Patient taking differently: Take 500 mg by mouth 2 (two) times daily. TAKE 1 TABLET BY MOUTH TWICE DAILY FOR 3 DAYS AS NEEDED FOR  FLARE), Disp: 60 tablet, Rfl: 0    medroxyPROGESTERone (PROVERA) 5 MG tablet, Take 1 tablet (5 mg total) by mouth 2 (two) times a day. for 10 days, Disp: 20 tablet, Rfl: 0     Social History     Tobacco Use    Smoking status: Never     Passive exposure: Never    Smokeless tobacco: Never   Substance Use Topics    Alcohol use: Yes     Comment: Special occasions    Drug use: Never       Family History   Problem Relation Age of Onset    COPD Mother     Coronary artery disease Mother         s/p CABG    Breast cancer Sister 42    Hypertension Maternal Grandmother     Diabetes Maternal Grandmother     Colon cancer Neg Hx     Ovarian cancer Neg Hx     Celiac disease Neg Hx     Cirrhosis Neg Hx     Crohn's disease Neg Hx     Esophageal cancer Neg Hx     Inflammatory bowel disease Neg Hx     Liver cancer Neg Hx     Liver disease Neg Hx     Rectal cancer Neg Hx     Stomach cancer Neg Hx     Ulcerative colitis Neg Hx     Pancreatic cancer Neg Hx     Kidney cancer Neg Hx     Bladder Cancer Neg Hx     Uterine cancer Neg Hx        Review of Systems (at today's evaluation)  Review of Systems   Constitutional:  Negative for fever and unexpected weight change.   Respiratory: Negative.     Cardiovascular:  Negative for chest pain and  palpitations.   Gastrointestinal:  Negative for nausea and vomiting.   Genitourinary:  Negative for dysuria, hematuria and urgency.        Gyn as per HPI   Neurological:  Negative for headaches.        Objective:      Vitals:    03/28/24 1619   BP: (!) 140/66     Physical Exam:   Constitutional: She appears well-developed and well-nourished.    HENT:   Head: Normocephalic.     Neck: No thyroid mass present.    Cardiovascular:  Normal rate.             Pulmonary/Chest: Effort normal. No respiratory distress.        Abdominal: Soft. There is no abdominal tenderness.             Musculoskeletal: Normal range of motion.      Right lower leg: No edema.      Left lower leg: No edema.       Neurological: She is alert.   No gross lesions noted.    Skin: Skin is warm and dry.    Psychiatric: She has a normal mood and affect. Her speech is normal and behavior is normal. Mood normal.     Recent Radiology Results:    12/1/2023 Routine     Narrative & Impression  EXAMINATION:  US PELVIS COMP WITH TRANSVAG NON-OB (XPD)     CLINICAL HISTORY:  Pelvic and perineal pain    FINDINGS:  Uterus:     Size: 9.1 x 4.7 x 5.6 cm cm     Masses: 1.3 x 1.2 x 3 cm slightly hyperechoic focus in the fundus suggesting uterine fibroid and appearing to represent the same finding that was seen on the prior exam 11/05/2021 where was measured at 1.3 x 1.2 x 1.2 cm.     Endometrium: Appears thick measuring 1.4 cm.  Patient's menopausal status not noted on the tech worksheet.     Right ovary:     Size: 2.1 x 1.6 x 1.8 cm     Appearance: 1.4 cm right ovarian cyst.     Vascular flow: Right ovarian flow as visualized appears normal.  Some limited visualization..     Left ovary:     Size: 4.2 x 1.8 x 3.4 cm     Appearance: Elongated cystic structure measuring approximately 3 x 3 x 0.8 cm likely ovarian cyst.     Vascular Flow: Left ovarian flow as visualized appears normal.     Free Fluid:     None.     Impression:     Uterine fibroid and bilateral ovarian  cysts.     The endometrium appears thick measured at 1.4 cm.    3/22/2024      Narrative & Impression  EXAMINATION:  US PELVIS COMP WITH TRANSVAG NON-OB (XPD)     CLINICAL HISTORY:  Abnormal uterine and vaginal bleeding, unspecified    FINDINGS:  Uterus:     Size: 9.7 x 5.5 cm     Masses: None     Endometrium: Normal in this pre menopausal patient, measuring 5.5 mm.     Right ovary:     Size: 2.3 x 2.2 cm     Appearance: Normal     Vascular flow: Normal.     Left ovary:     Size: 6.7 x 6.2 cm     Appearance: There is a 5.9 cm simple cyst and a 1.9 cm simple cyst.     Vascular Flow: Normal.     Free Fluid:     None.     Impression:     Two cyst of the left ovary the largest measuring 5.9 cm.       Assessment:        1. Abnormal uterine bleeding (AUB)    2. Pelvic pain        Plan:      Abnormal uterine bleeding (AUB)    Pelvic pain      Follow up for as needed for GYN issues or for annual exam.     The above was reviewed discussed with the patient.    We discussed her improved status following treatment with oral progesterone.    We discussed the fact that follow-up ultrasound noted an improved endometrial cavity.    From a gynecologic standpoint the patient is currently doing well without complaints.  She will continue with conservative management.      The patient's questions were answered, and she is in agreement with the current plan.     Ubaldo Gonzalez MD  Department OBGYN  [facility]

## 2024-04-23 RX ORDER — PANTOPRAZOLE SODIUM 40 MG/1
40 TABLET, DELAYED RELEASE ORAL
Qty: 90 TABLET | Refills: 0 | Status: SHIPPED | OUTPATIENT
Start: 2024-04-23

## 2024-04-29 ENCOUNTER — OFFICE VISIT (OUTPATIENT)
Dept: GASTROENTEROLOGY | Facility: CLINIC | Age: 49
End: 2024-04-29
Payer: COMMERCIAL

## 2024-04-29 DIAGNOSIS — Z51.81 ENCOUNTER FOR MONITORING LONG-TERM PROTON PUMP INHIBITOR THERAPY: ICD-10-CM

## 2024-04-29 DIAGNOSIS — Z86.19 HISTORY OF HELICOBACTER PYLORI INFECTION: Primary | ICD-10-CM

## 2024-04-29 DIAGNOSIS — K21.9 GASTROESOPHAGEAL REFLUX DISEASE, UNSPECIFIED WHETHER ESOPHAGITIS PRESENT: ICD-10-CM

## 2024-04-29 DIAGNOSIS — Z79.899 ENCOUNTER FOR MONITORING LONG-TERM PROTON PUMP INHIBITOR THERAPY: ICD-10-CM

## 2024-04-29 DIAGNOSIS — R13.19 ESOPHAGEAL DYSPHAGIA: ICD-10-CM

## 2024-04-29 DIAGNOSIS — R10.13 DYSPEPSIA: ICD-10-CM

## 2024-04-29 PROCEDURE — 1159F MED LIST DOCD IN RCRD: CPT | Mod: CPTII,95,, | Performed by: INTERNAL MEDICINE

## 2024-04-29 PROCEDURE — 99214 OFFICE O/P EST MOD 30 MIN: CPT | Mod: 95,,, | Performed by: INTERNAL MEDICINE

## 2024-04-29 PROCEDURE — 1160F RVW MEDS BY RX/DR IN RCRD: CPT | Mod: CPTII,95,, | Performed by: INTERNAL MEDICINE

## 2024-04-29 NOTE — Clinical Note
"Procedure: EGD  Diagnosis: Abdominal pain  Procedure Timin-6 weeks  *If within 4 weeks selected, please pedro as high priority*  *If greater than 12 weeks, please select "5-12 weeks" and delay sending until 3 months prior to requested date*  Location: No Preference  Additional Scheduling Information: No scheduling concerns  Prep Specifications:Standard prep  Is the patient taking a GLP-1 Agonist:no  Have you attached a patient to this message: yes "

## 2024-04-29 NOTE — PROGRESS NOTES
The patient location is: At work  The chief complaint leading to consultation is: dyspepsia and GERD and history of H. Pylori    Visit type: audiovisual    Face to Face time with patient: 20 minutes of total time spent on the encounter, which includes face to face time and non-face to face time preparing to see the patient (eg, review of tests), Obtaining and/or reviewing separately obtained history, Documenting clinical information in the electronic or other health record, Independently interpreting results (not separately reported) and communicating results to the patient/family/caregiver, or Care coordination (not separately reported).       Each patient to whom he or she provides medical services by telemedicine is:  (1) informed of the relationship between the physician and patient and the respective role of any other health care provider with respect to management of the patient; and (2) notified that he or she may decline to receive medical services by telemedicine and may withdraw from such care at any time.    Notes:          [facility] Consultation Note    Reason for Consult:  The encounter diagnosis was History of Helicobacter pylori infection.    PCP:   Uma Henry       Referring MD:  No referring provider defined for this encounter.    Initial History of Present Illness (HPI):  This is a 48 y.o. female here for evaluation of GERD symptoms intermittent esophageal dysphagia and dyspepsia.  Patient has a history of H pylori treated never did her stool for H pylori antigen to confirm eradication she knows to do it most accurately she needs to be off antibiotics and Pepto-Bismol for 4 weeks and off all PPIs and off all H2 blockers for at least 2 weeks or potentially could risk getting a false negative results she has not sure if she can get off her PPI to do it she is also ran out her PPI for few days and had worsening GERD symptoms just got back on it a few days ago and  starting to feel better but he has been having prior to that intermittent solid food dysphagia dyspepsia symptoms she had like an EGD for further evaluation will set her up for stool for H pylori antigen with the above instructions she has had her gallbladder removed she had an umbilical hernia repaired not having any issues there colonoscopy is up-to-date no fever no chills no shortness of breath no early satiety no nausea or vomiting bowel movements are at her baseline she is tried Linzess in the past but that gave her nausea vomiting and diarrhea she said.  No unintentional weight loss.  She has not taking a lot NSAIDs    Abdominal pain - no  Reflux - as above  Dysphagia - as above   Bowel habits - normal  GI bleeding - none  NSAID usage - none    Interval HPI 2024:  The patient's last visit with me was on 10/18/2023.      ROS:  Constitutional: No fevers, chills, No weight loss  ENT:  As above heartburn as above dysphagia no odynophagia no hoarseness  CV: No chest pain, no palpitation  Pulm: No cough, No shortness of breath, no wheezing  Ophtho: No vision changes  GI: see HPI  Derm: No rash, no itching  Heme: No lymphadenopathy, No easy bruising  MSK: No significant arthritis  : No dysuria, No hematuria  Endo: No hot or cold intolerance  Neuro: No syncope, No seizure, no strokes  Psych: No uncontrolled anxiety, No uncontrolled depression    Medical History:  has a past medical history of Asthma, GERD (gastroesophageal reflux disease), H. pylori infection, Hypertension, Incisional hernia, without obstruction or gangrene (2022), and Ovarian cyst.    Surgical History:  has a past surgical history that includes  section; LAPAROSCOPY LYSIS OF ADHESIONS (2018); Bilateral tubal ligation; Esophagogastroduodenoscopy (N/A, 2022); Colonoscopy (N/A, 2022); Laparoscopic cholecystectomy (N/A, 2022); Breast biopsy (Left, ); Esophagogastroduodenoscopy (N/A, 2022);  Robot-assisted repair of incisional hernia using da Vamshi Xi (N/A, 09/22/2022); Tubal ligation; Hernia repair (9/22/2022); and Cholecystectomy (2/7/2022).    Family History: family history includes Breast cancer (age of onset: 42) in her sister; COPD in her mother; Coronary artery disease in her mother; Diabetes in her maternal grandmother; Hypertension in her maternal grandmother..     Social History:  reports that she has never smoked. She has never been exposed to tobacco smoke. She has never used smokeless tobacco. She reports current alcohol use. She reports that she does not use drugs.    Review of patient's allergies indicates:  No Known Allergies    Medication List with Changes/Refills   Current Medications    ALBUTEROL (PROVENTIL) 5 MG/ML NEBULIZER SOLUTION    Inhale 2 puffs into the lungs every 4 (four) hours as needed for Shortness of Breath.    ALBUTEROL (VENTOLIN HFA) 90 MCG/ACTUATION INHALER    Inhale 2 puffs into the lungs every 6 (six) hours as needed for Wheezing. Rescue    AMLODIPINE (NORVASC) 5 MG TABLET    Take 1 tablet (5 mg total) by mouth once daily.    CICLOPIROX (PENLAC) 8 % SOLN    Apply topically nightly. Apply to affected  toenail(s) and adjacent skin once daily in combination with weekly nail trimming and periodic nail debridement. Remove with alcohol every 7 days; continue therapy until nail clearance (max duration 48wks)    CLOTRIMAZOLE-BETAMETHASONE 1-0.05% (LOTRISONE) CREAM    Apply topically 2 (two) times daily. PRN itch    FLUTICASONE PROPIONATE (FLONASE) 50 MCG/ACTUATION NASAL SPRAY    2 sprays (100 mcg total) by Each Nostril route once daily.    MEDROXYPROGESTERONE (PROVERA) 5 MG TABLET    Take 1 tablet (5 mg total) by mouth 2 (two) times a day. for 10 days    PANTOPRAZOLE (PROTONIX) 40 MG TABLET    TAKE 1 TABLET BY MOUTH BEFORE BREAKFAST    SPIRONOLACTONE (ALDACTONE) 25 MG TABLET    Take 1 tablet (25 mg total) by mouth once daily.    TIZANIDINE (ZANAFLEX) 4 MG TABLET    TAKE 1  TABLET BY MOUTH EVERY DAY AT BEDTIME AS NEEDED FOR BACK PAIN    TRETINOIN (RETIN-A) 0.05 % CREAM    Apply topically every evening.    VALACYCLOVIR (VALTREX) 500 MG TABLET    TAKE 1 TABLET BY MOUTH TWICE DAILY FOR 3 DAYS AS NEEDED FOR  FLARE         Objective Findings:    Vital Signs:  There were no vitals taken for this visit.  There is no height or weight on file to calculate BMI.    Physical Exam:  Telemedicine video visit  General Appearance: Well appearing in no acute distress  Neurologic:  Alert and oriented x4  Psychiatric:  Normal speech mentation and affect    Labs:  Lab Results   Component Value Date    WBC 7.48 09/29/2023    HGB 12.2 11/03/2023    HCT 35.8 (L) 09/29/2023     09/29/2023    CHOL 158 01/10/2023    TRIG 76 01/10/2023    HDL 41 01/10/2023    ALT 18 11/03/2023    AST 20 11/03/2023     01/10/2024    K 3.2 (L) 01/10/2024     01/10/2024    CREATININE 0.9 01/10/2024    BUN 9 01/10/2024    CO2 23 01/10/2024    TSH 1.107 01/10/2024       Medical Decision Making:  Lab work reviewed prior EGD and colonoscopy images and path personally reviewed by myself  Ppi talk given H pylori talk given patient never turn in a stool for H pylori antigen  EGD talk given  H. Pylori antigen test which tells us if H. Pylori has been successfully eradicated with the prior treatment medications, and please tell patient that Stool H. Pylori antigen needs to be done off the following medications for the following amount of time:    1. Off all Antibiotics for 4 (Four) weeks before stool collection.      2. Off all Proton Pump Inhibitors medications for 2 (Two) weeks before collecting stool for H. Pylori Antigen:    Nexium (esomeprazole)  Prilosec (omeprazole)   Protonix (pantoprazole)  Prevacid (lansoprazole)  Aciphex (rabeprazole)  Dexilant (dexlansoprazole)    Zegerid     3. Off all H2 blockers medications for 2 (Two) weeks before collecting stool for H. Pylori Antigen:    Zantac (ranitidine)  Tagamet  (cimetadine)  Axid (nizatidine)   Pepcid (famotidine)    4. Off Pepto-Bismol for 4 (four) weeks prior to collecting stool for H. Pylori Antigen.         Assessment:  1. History of Helicobacter pylori infection    2.      Esophageal dysphagia  3.      Dyspepsia  4.      GERD    Recommendations:  Stool for H pylori antigen ideally off PPIs and off H2 blockers for at least 2 weeks and off all Pepto-Bismol and antibiotics for least 4 weeks  Referral to endoscopy schedulers for EGD for GERD dyspepsia dysphagia  Patient to follow-up with primary care doctor for low potassium  Return GI clinic 3 months for follow-up okay for telemedicine video visit    Follow up in about 3 months (around 7/29/2024).      Order summary:  Orders Placed This Encounter    H. pylori antigen, stool         Thank you so much for allowing me to participate in the care of Ilene Tate MD    DISCLAIMER: This note was prepared with [employer] voice recognition transcription software. Garbled syntax, mangled or inadvertent pronouns, and other bizarre constructions may be attributed to that software system. While efforts were made to correct any mistakes made by this voice recognition program, some errors and/or omissions may remain in the note that were missed when the note was originally created.

## 2024-04-30 ENCOUNTER — PATIENT MESSAGE (OUTPATIENT)
Dept: GASTROENTEROLOGY | Facility: CLINIC | Age: 49
End: 2024-04-30
Payer: COMMERCIAL

## 2024-05-06 ENCOUNTER — TELEPHONE (OUTPATIENT)
Dept: ENDOSCOPY | Facility: HOSPITAL | Age: 49
End: 2024-05-06
Payer: COMMERCIAL

## 2024-05-06 VITALS — WEIGHT: 197 LBS | BODY MASS INDEX: 30.92 KG/M2 | HEIGHT: 67 IN

## 2024-05-06 DIAGNOSIS — R10.9 ABDOMINAL PAIN, UNSPECIFIED ABDOMINAL LOCATION: Primary | ICD-10-CM

## 2024-05-06 NOTE — TELEPHONE ENCOUNTER
"  Follow up in about 3 months (around 2024).  Procedure: EGD     Diagnosis: Abdominal pain     Procedure Timin-6 weeks     *If within 4 weeks selected, please pedro as high priority*     *If greater than 12 weeks, please select "5-12 weeks" and delay sending until 3 months prior to requested date*     Location: No Preference     Additional Scheduling Information: No scheduling concerns     Prep Specifications:Standard prep     Is the patient taking a GLP-1 Agonist:no     Have you attached a patient to this message: yes            "

## 2024-05-06 NOTE — TELEPHONE ENCOUNTER
Spoke to patient to schedule procedure(s) Upper Endoscopy (EGD)       Physician to perform procedure(s) Dr. MARIVEL Tate  Date of Procedure (s) 6/28/24  Arrival Time 7:40 AM  Time of Procedure(s) 8:40 AM   Location of Procedure(s) [address]  Type of Rx Prep sent to patient: N/A  Instructions provided to patient via [facility]    Patient was informed on the following information and verbalized understanding. Screening questionnaire reviewed with patient and complete. If procedure requires anesthesia, a responsible adult needs to be present to accompany the patient home, patient cannot drive after receiving anesthesia. Appointment details are tentative, especially check-in time. Patient will receive a prep-op call 7 days prior to confirm check-in time for procedure. If applicable the patient should contact their pharmacy to verify Rx for procedure prep is ready for pick-up. Patient was advised to call the scheduling department at [phone] if pharmacy states no Rx is available. Patient was advised to call the endoscopy scheduling department if any questions or concerns arise.      [facility] Scheduling Department

## 2024-05-06 NOTE — TELEPHONE ENCOUNTER
Received voicemail from Endo scheduling main line.  Ilene Cisneros     [id number]   MR [id number]   please call back.

## 2024-05-09 DIAGNOSIS — E87.6 HYPOKALEMIA: ICD-10-CM

## 2024-05-09 RX ORDER — SPIRONOLACTONE 25 MG/1
25 TABLET ORAL
Qty: 90 TABLET | Refills: 0 | Status: SHIPPED | OUTPATIENT
Start: 2024-05-09

## 2024-05-09 NOTE — TELEPHONE ENCOUNTER
No care due was identified.  [facility] Embedded Care Due Messages. Reference number: [id number].   5/09/2024 6:19:59 PM CDT

## 2024-05-10 NOTE — TELEPHONE ENCOUNTER
Refill Routing Note   Medication(s) are not appropriate for processing by [facility] for the following reason(s):        Required labs abnormal: hypokalemia  Required vitals abnormal: 140/66     [facility] action(s):  Defer               Appointments  past 12m or future 3m with PCP    Date Provider   Last Visit   1/10/2024 Uma Henry MD   Next Visit   7/9/2024 Uma Henry MD   ED visits in past 90 days: 0        Note composed:7:32 PM 05/09/2024

## 2024-05-18 DIAGNOSIS — A60.00 GENITAL HERPES SIMPLEX, UNSPECIFIED SITE: ICD-10-CM

## 2024-05-18 NOTE — TELEPHONE ENCOUNTER
No care due was identified.  [facility] Embedded Care Due Messages. Reference number: [id number].   5/18/2024 8:30:57 AM CDT

## 2024-05-20 RX ORDER — VALACYCLOVIR HYDROCHLORIDE 500 MG/1
TABLET, FILM COATED ORAL
Qty: 60 TABLET | Refills: 0 | Status: SHIPPED | OUTPATIENT
Start: 2024-05-20

## 2024-06-27 ENCOUNTER — TELEPHONE (OUTPATIENT)
Dept: ENDOSCOPY | Facility: HOSPITAL | Age: 49
End: 2024-06-27
Payer: COMMERCIAL

## 2024-06-27 NOTE — TELEPHONE ENCOUNTER
Received Called: Pt called to cancel upcoming procedure. Will called back when ready to schedule

## 2024-07-09 ENCOUNTER — TELEPHONE (OUTPATIENT)
Dept: PRIMARY CARE CLINIC | Facility: CLINIC | Age: 49
End: 2024-07-09
Payer: COMMERCIAL

## 2024-07-09 NOTE — TELEPHONE ENCOUNTER
Pt had urine odor and dysuria. She did a dip stick at work last week and had positive leukocytes,nitrates and blood. 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 Her sxs are now resolved. She had an appt today but was unable to get out of work. I rescheduled her appt to 7/29(unless something opens sooner) She would like to know if we could order a u/a for make sure the UTI resolved

## 2024-07-28 NOTE — PROGRESS NOTES
[facility]    Chief Complaint      Chief Complaint   Patient presents with    Annual Exam       History of Present Illness      Ilene Garber is a 48 y.o.  AAF with HTN, Hypokalemia, GERD, Mild Intermittent Asthma, Dysmnenorrhea, RT Rotator cuff tear presents to fu well visit. Last virtual visit - 9/15/23     Hypokalemia - Potassium was low at 3.2. I think the medications adjustments we made should help with this. We will see what your repeat Potassium looks like. In the meantime I rec a high potassium diet (bananas, oranges, spinach, potato, mushrooms, peas, sweet potatoes). Pt on aldactone. magnesium level was normal     Hematuria -resolved Pt not on cycle today.       Lip fasciculation - Resolved. She showed me a video of a mild lip fasciculation.  No other Sx's. She will alert me for any other Sx's or if this worsens. Suspect likely a benign fasciculation. Started this wk. No numbness/weakness.  No fasciculations anywhere else.      AUB/Dysmenorrhea - Fu by OB. Pelvic U/s - 12/1/23 - Uterine fibroid and amna ovarian cysts. Thick endometrium 1.4 cm was given medroxyprogesterone and if persists will need EMB or D&C.  Pelvic U /S - 3/22/24 - Two cyst of the left ovary the largest measuring 5.9 cm.      Constipation -  Started Linzess per Dr. Tate. She took for 1 wk and it worked too well. Would like lower dose or something else. Plans to d/w Dr. Tate.      Hosp -9/6/23 - 9/7/23 -  Abd pain x 3 days + Abd abscess/colitis. She was tx with flagyl,cipro,and Levaquin. Ultrasound-guided aspiration of anterior abdominal wall fluid collection as above yielding only 2 mL of serous fluid.  Peritoneal Cx - Anaerobic and aerobic both neg.  Feeling better since completing abx.  Fu by Dr. Thomason.     H/o gastritis - EGD 1/19/22- H. pylori gastritis Mild active proctitis. She was tx with Quad Tx. Repeat EGD 7/12/22 - 1 cm hiatal hernia.  Pt on Protonix daily. She is being fu by GI,   "Bebeto.   Avoids NSAIDs. Due for EGD - plans to reschedule.      H/o gallstones - CT abd 11/30/21 - Nitrogen containing cholelithiasis with small volume of nonspecific pericholecystic fluid.  No evidence of wall thickening or pericholecystic fat stranding. If there is clinical concern for cholecystitis, recommend HIDA scan. Few scattered prominent mesenteric lymph nodes without evidence of pathologic lymphadenopathy.GI referred to general surgery for evaluation of postprandial abdominal pain in gallstone seen on CT scan.  Pt is s/p lap delilah 2/7/22 with Dr. Thomason.     Iman umbilical hernia s/p repair - 9/22/22 -Doing well.      Iron def - Pt was iron deficient but not anemic and Dr. Tate had recommend that she take ferrous gluconate one 324mg pill once daily when on her cycle. She has not been on it. Resolved. Iron studies are normal.        Vit D def -  Resolved. Vit D level which was normal at 47. Pt completed Ergo per Dr. Tate 50,000 units ONCE A WEEK (every 7-days) for 12 wks. Rec Vitamin D3 4,000 units/d OTC.     Low HDL - Her Cholesterol looks ok but her HDL (good Cholesterol) is low.  I recommend exercise to increase this.  Repeat FLP next visit.      HSV genital - Pt on Valtrex prn.      HTN - We recently switched her from HCTZ to Spironolactone 25 mg/d.  She is doing well. Her potassium improved. She is now on amlodipine 5 mg/d since 9/26/22.  Derm inc her aldactone from 25 mg to 50 and then 100 mg for tx of her acne. She stopped the aldactone. Doing well. No dizziness. Cont to monitor K+. Edema returned. She started taking her old HCTZ x 2 days. We stopped this and resumed a low dose of aldactone at 25 mg/d.       GERD- Pt on Protonix daily. Fu by GI, Dr. Tate. Planning for EGD.      RT shoulder pain - She was injured at work. She tore her rotator cuff.  "It comes and goes but has been ok lately". Avoid NSAIDS. She takes Tylenol prn, heating pad prn, voltaren gel prn. Xanaflex prn. she " does some Ptx.      Mild intermittent Asthma - Allergies/weather change are a trigger.  Typically only needs Proair 5 times/yr.   Allergies - trigger. She has had flares with weather change. Zyrtec and flonase prn help.      Acne - Pt is off Aldactone 100 mg/d. Will resume 25 mg dose.  Repeat K+.      Obesity - BMI - 30.73 down 5lb since last visit. Rec exercise every other day. Rec low carb diet.     HCM - Flu - 1/10/24;  Tdap - 7/10/23;  PCV 13 - ?;  PVX 23 - ?;  Shingrix - due at 52 y.o.;  RSV - due at 62 y.o.; COVID -19 Vaccine - (Moderna) # 1 - 2/10/21 and #2 - 3/11/21;  MGM - 23 -cyst -  repeat 1 yr;  PAP - - neg.;  hep C screen - 21 - neg.HIV Screen -21 -neg. ; C-scope - 22 - diverticulosis, hemorrhoids, erythema - c/w mild active proctitis- repeat 10 yrs; EGD - 22 - Moderate chronic antral and oxyntic gastritis with mild activity Prev PCP - Dr. Rory Cooper; Ob/GYN - Dr. Dey;  GI - Dr. Tate; Well visit - 23      Patient Care Team:  Uma Henry MD as PCP - General (Internal Medicine)  Anderson Dey Jr., MD as Obstetrician (Obstetrics)  Ayala Sharma MA as Care Coordinator     Health Maintenance:  Immunization History   Administered Date(s) Administered    COVID-19, MRNA, LN-S, PF (MODERNA FULL 0.5 ML DOSE) 02/10/2021, 2021    Influenza - Quadrivalent - MDCK - PF 2021, 2023    Tdap 07/10/2023      Health Maintenance   Topic Date Due    Mammogram  2024    Lipid Panel  01/10/2028    Colorectal Cancer Screening  2032    TETANUS VACCINE  07/10/2033    Hepatitis C Screening  Completed        Past Medical History:  Past Medical History:   Diagnosis Date    Asthma     GERD (gastroesophageal reflux disease)     H. pylori infection     Hypertension     Incisional hernia, without obstruction or gangrene 2022    Ovarian cyst        Past Surgical History:   has a past surgical history that includes  section;  LAPAROSCOPY LYSIS OF ADHESIONS (11/28/2018); Bilateral tubal ligation; Esophagogastroduodenoscopy (N/A, 01/19/2022); Colonoscopy (N/A, 01/19/2022); Laparoscopic cholecystectomy (N/A, 02/07/2022); Breast biopsy (Left, 2020); Esophagogastroduodenoscopy (N/A, 07/12/2022); Robot-assisted repair of incisional hernia using da Vamshi Xi (N/A, 09/22/2022); Tubal ligation; Hernia repair (9/22/2022); and Cholecystectomy (2/7/2022).    Family History:  family history includes Breast cancer (age of onset: 42) in her sister; COPD in her mother; Coronary artery disease in her mother; Diabetes in her maternal grandmother; Hypertension in her maternal grandmother.     Social History:  Social History     Tobacco Use    Smoking status: Never     Passive exposure: Never    Smokeless tobacco: Never   Substance Use Topics    Alcohol use: Yes     Comment: Special occasions    Drug use: Never       Review of Systems   Constitutional:  Positive for fatigue. Negative for chills, diaphoresis and fever.   HENT:  Positive for postnasal drip. Negative for nasal congestion and sore throat.         Better with flonase.    Respiratory:  Negative for cough and chest tightness.         No snoring. No witnessed apnea.    Cardiovascular:  Negative for chest pain and palpitations.   Gastrointestinal:  Positive for constipation and reflux. Negative for abdominal pain, blood in stool, diarrhea, nausea and vomiting.        Protonix only helps sometimes. She has to reschedule her EGD.    Endocrine: Positive for heat intolerance. Negative for cold intolerance and polydipsia.        Hot flashes   Genitourinary:  Negative for bladder incontinence, difficulty urinating and frequency.   Musculoskeletal:  Positive for arthralgias and back pain. Negative for myalgias.        Rt shoulder. Takes muscle relaxants prn back pain. Tylenol prn helps.  Rarely takes ibuprofen.    Neurological:  Positive for memory loss.        Loses her thoughts. Did an MMSE with SW at  work. She has dec attention span. She has trouble focusing.    Psychiatric/Behavioral:  Positive for decreased concentration. Negative for depressed mood. The patient is nervous/anxious.         Work has been stressful. Take melatonin prn for sleep. She reports a h/o ADD in childhood and Mom did not give her Rx for it although she was Rx meds for it.  She never had issues in school. She reports being easily distractible. Takes notes to help her stay on top of things.         Medications:    Current Outpatient Medications:     albuterol (PROVENTIL) 5 mg/mL nebulizer solution, Inhale 2 puffs into the lungs every 4 (four) hours as needed for Shortness of Breath., Disp: , Rfl:     albuterol (VENTOLIN HFA) 90 mcg/actuation inhaler, Inhale 2 puffs into the lungs every 6 (six) hours as needed for Wheezing. Rescue, Disp: 18 g, Rfl: 3    ciclopirox (PENLAC) 8 % Soln, Apply topically nightly. Apply to affected  toenail(s) and adjacent skin once daily in combination with weekly nail trimming and periodic nail debridement. Remove with alcohol every 7 days; continue therapy until nail clearance (max duration 48wks), Disp: 6.6 mL, Rfl: 2    clotrimazole-betamethasone 1-0.05% (LOTRISONE) cream, Apply topically 2 (two) times daily. PRN itch, Disp: 45 g, Rfl: 1    tiZANidine (ZANAFLEX) 4 MG tablet, TAKE 1 TABLET BY MOUTH EVERY DAY AT BEDTIME AS NEEDED FOR BACK PAIN, Disp: 30 tablet, Rfl: 0    tretinoin (RETIN-A) 0.05 % cream, Apply topically every evening., Disp: 45 g, Rfl: 5    valACYclovir (VALTREX) 500 MG tablet, TAKE 1 TABLET BY MOUTH TWICE DAILY FOR 3 DAYS AS NEEDED FOR  FLARE, Disp: 60 tablet, Rfl: 0    amLODIPine (NORVASC) 5 MG tablet, Take 1 tablet (5 mg total) by mouth once daily., Disp: 90 tablet, Rfl: 1    fluticasone propionate (FLONASE) 50 mcg/actuation nasal spray, 2 sprays (100 mcg total) by Each Nostril route once daily., Disp: 18.2 mL, Rfl: 8    pantoprazole (PROTONIX) 40 MG tablet, Take 1 tablet (40 mg total) by  "mouth before breakfast., Disp: 90 tablet, Rfl: 0    spironolactone (ALDACTONE) 25 MG tablet, Take 1 tablet (25 mg total) by mouth once daily., Disp: 90 tablet, Rfl: 1     Allergies:  Review of patient's allergies indicates:  No Known Allergies    Physical Exam      Vital Signs  Temp: 98.2 °F (36.8 °C)  Temp Source: Oral  Pulse: 73  Resp: 16  SpO2: 98 %  BP: 116/84  BP Location: Right arm  Patient Position: Sitting  Pain Score: 0-No pain  Height and Weight  Height: 5' 7" (170.2 cm)  Weight: 89 kg (196 lb 3.4 oz)  BSA (Calculated - sq m): 2.05 sq meters  BMI (Calculated): 30.7  Weight in (lb) to have BMI = 25: 159.3      Patient Position: Sitting      Physical Exam  Vitals reviewed.   Constitutional:       General: She is not in acute distress.     Appearance: Normal appearance. She is not ill-appearing, toxic-appearing or diaphoretic.   HENT:      Head: Normocephalic and atraumatic.      Left Ear: Tympanic membrane normal.      Ears:      Comments: Rt serous otitis     Mouth/Throat:      Mouth: Mucous membranes are moist.      Pharynx: Oropharynx is clear.   Eyes:      Extraocular Movements: Extraocular movements intact.      Conjunctiva/sclera: Conjunctivae normal.      Pupils: Pupils are equal, round, and reactive to light.   Neck:      Vascular: No carotid bruit.   Cardiovascular:      Rate and Rhythm: Normal rate and regular rhythm.      Pulses: Normal pulses.      Heart sounds: Normal heart sounds. No murmur heard.  Pulmonary:      Effort: No respiratory distress.      Breath sounds: Normal breath sounds. No wheezing.   Abdominal:      General: Bowel sounds are normal. There is no distension.      Palpations: Abdomen is soft.      Tenderness: There is no abdominal tenderness. There is no guarding or rebound.   Skin:     General: Skin is warm.   Neurological:      General: No focal deficit present.      Mental Status: She is alert and oriented to person, place, and time.   Psychiatric:         Mood and Affect: " Mood normal.         Behavior: Behavior normal.          Laboratory:  CBC:  Recent Labs   Lab 09/06/23  1847 09/07/23  0555 09/29/23  1540 11/03/23  1635   WBC 8.64 7.49 7.48  --    RBC 4.26 4.05 4.16  --    Hemoglobin 12.5 11.7 L 11.7 L 12.2   Hematocrit 38.5 36.6 L 35.8 L  --    Platelets 264 246 325  --    MCV 90 90 86  --    MCH 29.3 28.9 28.1  --    MCHC 32.5 32.0 32.7  --        CMP:  Recent Labs   Lab 07/10/23  1535 09/06/23  1847 09/07/23  0555 11/03/23  1635 01/10/24  1625   Glucose 78 89   < > 82 86   Calcium 9.2 8.9   < > 9.4 9.3   Albumin 4.0 4.2  --  4.3  --    Total Protein 7.7 8.2  --  8.3  --    Sodium 137 133 L   < > 139 138   Potassium 3.4 L 3.5   < > 3.3 L 3.2 L   CO2 21 L 24   < > 21 L 23   Chloride 106 103   < > 105 107   BUN 9 12   < > 11 9   Creatinine 0.9 0.9   < > 1.0 0.9   Alkaline Phosphatase 62 56  --  64  --    ALT 17 19  --  18  --    AST 20 23  --  20  --    Total Bilirubin 0.5 0.7  --  0.4  --     < > = values in this interval not displayed.           URINALYSIS:  Recent Labs   Lab 09/06/23  1853 09/15/23  1143 01/10/24  1615   Color, UA Yellow Yellow  --    Specific Gravity, UA 1.020 1.025  --    pH, UA 7.0 6.0  --    Protein, UA Trace A Trace A  --    Bacteria Negative  --  Rare   Nitrite, UA Negative Negative  --    Leukocytes, UA 3+ A Negative  --    Urobilinogen, UA 2.0-3.0 A  --   --    Hyaline Casts, UA 11 A  --   --         LIPIDS:  Recent Labs   Lab 01/03/22  0900 01/10/23  0825 01/10/24  1625   TSH 1.510 1.316 1.107   HDL 35 L 41  --    Cholesterol 151 158  --    Triglycerides 93 76  --    LDL Cholesterol 97.4 101.8  --    HDL/Cholesterol Ratio 23.2 25.9  --    Non-HDL Cholesterol 116 117  --    Total Cholesterol/HDL Ratio 4.3 3.9  --        TSH:  Recent Labs   Lab 01/03/22  0900 01/10/23  0825 01/10/24  1625   TSH 1.510 1.316 1.107          Other:   Recent Labs   Lab 11/05/21  1415 01/03/22  0900 05/20/22  1445 11/14/22  0807 07/10/23  1535 09/29/23  1540 11/03/23  1635    Follicle Stimulating Hormone  --   --   --   --   --  8.67  --    Estradiol  --   --   --   --   --  167  --    Vit D, 25-Hydroxy 10 L  --  24 L 31  --   --   --    Vitamin B-12 253  --   --   --   --   --   --    Ferritin 51   < >  --   --    < >  --  75   Iron 69   < >  --   --    < >  --  79   Transferrin 278   < >  --   --    < >  --  266   TIBC 411   < >  --   --    < >  --  394   Saturated Iron 17 L   < >  --   --    < >  --  20    < > = values in this interval not displayed.     Recent Labs   Lab 11/03/23  1635   Hepatitis C Ab Non-reactive       Assessment/Plan     Ilene Garber is a 48 y.o.female with:    Normal physical exam, routine  -     Comprehensive Metabolic Panel; Future; Expected date: 07/29/2024  -     Magnesium; Future; Expected date: 07/29/2024  - Performed today.  Will check Basic labs.  RTC in 1 yr for fu or sooner if needed     Hypertension, unspecified type  -     Comprehensive Metabolic Panel; Future; Expected date: 07/29/2024  -     amLODIPine (NORVASC) 5 MG tablet; Take 1 tablet (5 mg total) by mouth once daily.  Dispense: 90 tablet; Refill: 1  - Controlled.  Cont current.     Gastroesophageal reflux disease, unspecified whether esophagitis present  - Pt plans to reschedule EGD.     Memory deficit  -     Vitamin B1; Future; Expected date: 07/29/2024  -     Ambulatory referral/consult to Adult Neuropsychology; Future; Expected date: 08/05/2024  - Refer to Neuro Psych. Repeat B12 and B1.     Concentration deficit  - Refer to Neuro Psych.  DDX anxiety, ADD, Memory impairment less likely.     Allergic rhinitis, unspecified seasonality, unspecified trigger  -     fluticasone propionate (FLONASE) 50 mcg/actuation nasal spray; 2 sprays (100 mcg total) by Each Nostril route once daily.  Dispense: 18.2 mL; Refill: 8  - Cont flonase prn.     Hypokalemia  -     Comprehensive Metabolic Panel; Future; Expected date: 07/29/2024  -     Magnesium; Future; Expected date: 07/29/2024  -      spironolactone (ALDACTONE) 25 MG tablet; Take 1 tablet (25 mg total) by mouth once daily.  Dispense: 90 tablet; Refill: 1  - Cont current. Repeat Lytes.     Vitamin B12 deficiency        -     Vitamin B12; Future; Expected date: 07/29/2024  -     Vitamin B1; Future; Expected date: 07/29/2024    Screening for lipoid disorders  -     Lipid Panel; Future; Expected date: 07/29/2024    Screening mammogram, encounter for  -     Mammo Digital Screening Bilat w/ Marcos; Future; Expected date: 09/15/2024    Other orders  -     pantoprazole (PROTONIX) 40 MG tablet; Take 1 tablet (40 mg total) by mouth before breakfast.  Dispense: 90 tablet; Refill: 0         Chronic conditions status updated as per HPI.  Other than changes above, cont current medications and maintain follow up with specialists.   Follow up in about 3 months (around 10/29/2024).      Uma Henry MD  [facility]

## 2024-07-29 ENCOUNTER — OFFICE VISIT (OUTPATIENT)
Dept: PRIMARY CARE CLINIC | Facility: CLINIC | Age: 49
End: 2024-07-29
Payer: COMMERCIAL

## 2024-07-29 VITALS
BODY MASS INDEX: 30.79 KG/M2 | TEMPERATURE: 98 F | DIASTOLIC BLOOD PRESSURE: 84 MMHG | WEIGHT: 196.19 LBS | RESPIRATION RATE: 16 BRPM | HEIGHT: 67 IN | SYSTOLIC BLOOD PRESSURE: 116 MMHG | OXYGEN SATURATION: 98 % | HEART RATE: 73 BPM

## 2024-07-29 DIAGNOSIS — R41.840 CONCENTRATION DEFICIT: ICD-10-CM

## 2024-07-29 DIAGNOSIS — Z12.31 SCREENING MAMMOGRAM, ENCOUNTER FOR: ICD-10-CM

## 2024-07-29 DIAGNOSIS — Z00.00 NORMAL PHYSICAL EXAM, ROUTINE: Primary | ICD-10-CM

## 2024-07-29 DIAGNOSIS — I10 HYPERTENSION, UNSPECIFIED TYPE: ICD-10-CM

## 2024-07-29 DIAGNOSIS — J30.9 ALLERGIC RHINITIS, UNSPECIFIED SEASONALITY, UNSPECIFIED TRIGGER: ICD-10-CM

## 2024-07-29 DIAGNOSIS — E87.6 HYPOKALEMIA: ICD-10-CM

## 2024-07-29 DIAGNOSIS — E53.8 VITAMIN B12 DEFICIENCY: ICD-10-CM

## 2024-07-29 DIAGNOSIS — R41.3 MEMORY DEFICIT: ICD-10-CM

## 2024-07-29 DIAGNOSIS — Z13.220 SCREENING FOR LIPOID DISORDERS: ICD-10-CM

## 2024-07-29 DIAGNOSIS — K21.9 GASTROESOPHAGEAL REFLUX DISEASE, UNSPECIFIED WHETHER ESOPHAGITIS PRESENT: ICD-10-CM

## 2024-07-29 PROCEDURE — 3079F DIAST BP 80-89 MM HG: CPT | Mod: CPTII,S$GLB,, | Performed by: INTERNAL MEDICINE

## 2024-07-29 PROCEDURE — 3008F BODY MASS INDEX DOCD: CPT | Mod: CPTII,S$GLB,, | Performed by: INTERNAL MEDICINE

## 2024-07-29 PROCEDURE — 99999 PR PBB SHADOW E&M-EST. PATIENT-LVL V: CPT | Mod: PBBFAC,,, | Performed by: INTERNAL MEDICINE

## 2024-07-29 PROCEDURE — 1159F MED LIST DOCD IN RCRD: CPT | Mod: CPTII,S$GLB,, | Performed by: INTERNAL MEDICINE

## 2024-07-29 PROCEDURE — 99396 PREV VISIT EST AGE 40-64: CPT | Mod: S$GLB,,, | Performed by: INTERNAL MEDICINE

## 2024-07-29 PROCEDURE — 1160F RVW MEDS BY RX/DR IN RCRD: CPT | Mod: CPTII,S$GLB,, | Performed by: INTERNAL MEDICINE

## 2024-07-29 PROCEDURE — 3074F SYST BP LT 130 MM HG: CPT | Mod: CPTII,S$GLB,, | Performed by: INTERNAL MEDICINE

## 2024-07-29 RX ORDER — AMLODIPINE BESYLATE 5 MG/1
5 TABLET ORAL DAILY
Qty: 90 TABLET | Refills: 1 | Status: SHIPPED | OUTPATIENT
Start: 2024-07-29 | End: 2025-07-29

## 2024-07-29 RX ORDER — SPIRONOLACTONE 25 MG/1
25 TABLET ORAL DAILY
Qty: 90 TABLET | Refills: 1 | Status: SHIPPED | OUTPATIENT
Start: 2024-07-29

## 2024-07-29 RX ORDER — FLUTICASONE PROPIONATE 50 MCG
2 SPRAY, SUSPENSION (ML) NASAL DAILY
Qty: 18.2 ML | Refills: 8 | Status: SHIPPED | OUTPATIENT
Start: 2024-07-29

## 2024-07-29 RX ORDER — PANTOPRAZOLE SODIUM 40 MG/1
40 TABLET, DELAYED RELEASE ORAL
Qty: 90 TABLET | Refills: 0 | Status: SHIPPED | OUTPATIENT
Start: 2024-07-29

## 2024-08-26 ENCOUNTER — LAB VISIT (OUTPATIENT)
Dept: LAB | Facility: HOSPITAL | Age: 49
End: 2024-08-26
Attending: INTERNAL MEDICINE
Payer: COMMERCIAL

## 2024-08-26 DIAGNOSIS — E87.6 HYPOKALEMIA: ICD-10-CM

## 2024-08-26 DIAGNOSIS — E53.8 VITAMIN B12 DEFICIENCY: ICD-10-CM

## 2024-08-26 DIAGNOSIS — I10 HYPERTENSION, UNSPECIFIED TYPE: ICD-10-CM

## 2024-08-26 DIAGNOSIS — Z00.00 NORMAL PHYSICAL EXAM, ROUTINE: ICD-10-CM

## 2024-08-26 DIAGNOSIS — R41.3 MEMORY DEFICIT: ICD-10-CM

## 2024-08-26 DIAGNOSIS — Z13.220 SCREENING FOR LIPOID DISORDERS: ICD-10-CM

## 2024-08-26 LAB
ALBUMIN SERPL BCP-MCNC: 4 G/DL (ref 3.5–5.2)
ALP SERPL-CCNC: 65 U/L (ref 55–135)
ALT SERPL W/O P-5'-P-CCNC: 16 U/L (ref 10–44)
ANION GAP SERPL CALC-SCNC: 8 MMOL/L (ref 8–16)
AST SERPL-CCNC: 18 U/L (ref 10–40)
BILIRUB SERPL-MCNC: 0.8 MG/DL (ref 0.1–1)
BUN SERPL-MCNC: 10 MG/DL (ref 6–20)
CALCIUM SERPL-MCNC: 9.4 MG/DL (ref 8.7–10.5)
CHLORIDE SERPL-SCNC: 104 MMOL/L (ref 95–110)
CHOLEST SERPL-MCNC: 158 MG/DL (ref 120–199)
CHOLEST/HDLC SERPL: 3.4 {RATIO} (ref 2–5)
CO2 SERPL-SCNC: 24 MMOL/L (ref 23–29)
CREAT SERPL-MCNC: 1 MG/DL (ref 0.5–1.4)
EST. GFR  (NO RACE VARIABLE): >60 ML/MIN/1.73 M^2
GLUCOSE SERPL-MCNC: 88 MG/DL (ref 70–110)
HDLC SERPL-MCNC: 47 MG/DL (ref 40–75)
HDLC SERPL: 29.7 % (ref 20–50)
LDLC SERPL CALC-MCNC: 95.8 MG/DL (ref 63–159)
MAGNESIUM SERPL-MCNC: 2 MG/DL (ref 1.6–2.6)
NONHDLC SERPL-MCNC: 111 MG/DL
POTASSIUM SERPL-SCNC: 3.7 MMOL/L (ref 3.5–5.1)
PROT SERPL-MCNC: 7.8 G/DL (ref 6–8.4)
SODIUM SERPL-SCNC: 136 MMOL/L (ref 136–145)
TRIGL SERPL-MCNC: 76 MG/DL (ref 30–150)
VIT B12 SERPL-MCNC: 243 PG/ML (ref 210–950)

## 2024-08-26 PROCEDURE — 83735 ASSAY OF MAGNESIUM: CPT | Performed by: INTERNAL MEDICINE

## 2024-08-26 PROCEDURE — 84425 ASSAY OF VITAMIN B-1: CPT | Performed by: INTERNAL MEDICINE

## 2024-08-26 PROCEDURE — 80053 COMPREHEN METABOLIC PANEL: CPT | Performed by: INTERNAL MEDICINE

## 2024-08-26 PROCEDURE — 80061 LIPID PANEL: CPT | Performed by: INTERNAL MEDICINE

## 2024-08-26 PROCEDURE — 36415 COLL VENOUS BLD VENIPUNCTURE: CPT | Mod: PN | Performed by: INTERNAL MEDICINE

## 2024-08-26 PROCEDURE — 82607 VITAMIN B-12: CPT | Performed by: INTERNAL MEDICINE

## 2024-08-28 NOTE — PROGRESS NOTES
I sent pt a my chart message -  I reviewed your  labs.    Your vitamin B12 level remains low at 243. Are you taking OTC Vitamin B12 and if so how much and how often?  If not then I recommend you start OTC Vitamin B12 1000 mcg daily and I would not recommend taking this within 4 hrs of your Protonix. Your Cholesterol looked good.  Your kidney function and liver functions looked good.  Your magnesium level was normal at 2.0. I still await your Vitamin B1 (thiamine) level. No further recommendations at this time.  Dr. CONTRERAS

## 2024-08-30 LAB — VIT B1 BLD-MCNC: 48 UG/L (ref 38–122)

## 2024-09-03 ENCOUNTER — TELEPHONE (OUTPATIENT)
Dept: PRIMARY CARE CLINIC | Facility: CLINIC | Age: 49
End: 2024-09-03
Payer: COMMERCIAL

## 2024-09-03 NOTE — TELEPHONE ENCOUNTER
----- Message from Uma Henry MD sent at 9/2/2024  9:32 PM CDT -----  I sent pt a my chart message -  Your Vitamin B1 (thiamine) is normal.   Dr. CONTRERAS

## 2024-10-28 ENCOUNTER — TELEPHONE (OUTPATIENT)
Dept: NEUROLOGY | Facility: CLINIC | Age: 49
End: 2024-10-28
Payer: COMMERCIAL

## 2024-11-02 DIAGNOSIS — I10 HYPERTENSION, UNSPECIFIED TYPE: ICD-10-CM

## 2024-11-02 DIAGNOSIS — E87.6 HYPOKALEMIA: ICD-10-CM

## 2024-11-02 DIAGNOSIS — M54.50 INTERMITTENT LOW BACK PAIN: ICD-10-CM

## 2024-11-02 NOTE — TELEPHONE ENCOUNTER
No care due was identified.  [facility] Embedded Care Due Messages. Reference number: [id number].   11/02/2024 12:33:29 PM CDT

## 2024-11-02 NOTE — TELEPHONE ENCOUNTER
No care due was identified.  [facility] Embedded Care Due Messages. Reference number: [id number].   11/02/2024 12:34:40 PM CDT

## 2024-11-02 NOTE — TELEPHONE ENCOUNTER
No care due was identified.  Health [facility] Embedded Care Due Messages. Reference number: [id number].   11/02/2024 12:38:20 PM CDT

## 2024-11-04 RX ORDER — TIZANIDINE 4 MG/1
TABLET ORAL
Qty: 30 TABLET | Refills: 0 | Status: SHIPPED | OUTPATIENT
Start: 2024-11-04

## 2024-11-05 RX ORDER — AMLODIPINE BESYLATE 5 MG/1
5 TABLET ORAL DAILY
Qty: 90 TABLET | Refills: 0 | Status: SHIPPED | OUTPATIENT
Start: 2024-11-05 | End: 2025-11-05

## 2024-11-05 RX ORDER — SPIRONOLACTONE 25 MG/1
25 TABLET ORAL DAILY
Qty: 90 TABLET | Refills: 0 | Status: SHIPPED | OUTPATIENT
Start: 2024-11-05

## 2024-11-05 RX ORDER — PANTOPRAZOLE SODIUM 40 MG/1
40 TABLET, DELAYED RELEASE ORAL
Qty: 90 TABLET | Refills: 0 | Status: SHIPPED | OUTPATIENT
Start: 2024-11-05

## 2024-11-08 ENCOUNTER — OFFICE VISIT (OUTPATIENT)
Dept: PRIMARY CARE CLINIC | Facility: CLINIC | Age: 49
End: 2024-11-08
Payer: COMMERCIAL

## 2024-11-08 DIAGNOSIS — J01.90 ACUTE BACTERIAL SINUSITIS: Primary | ICD-10-CM

## 2024-11-08 DIAGNOSIS — J45.21 MILD INTERMITTENT ASTHMA WITH ACUTE EXACERBATION: ICD-10-CM

## 2024-11-08 DIAGNOSIS — B96.89 ACUTE BACTERIAL SINUSITIS: Primary | ICD-10-CM

## 2024-11-08 RX ORDER — PREDNISONE 20 MG/1
TABLET ORAL
Qty: 5 TABLET | Refills: 0 | Status: SHIPPED | OUTPATIENT
Start: 2024-11-08 | End: 2024-11-13

## 2024-11-08 RX ORDER — ALBUTEROL SULFATE 90 UG/1
2 INHALANT RESPIRATORY (INHALATION) EVERY 6 HOURS PRN
Qty: 18 G | Refills: 2 | Status: SHIPPED | OUTPATIENT
Start: 2024-11-08 | End: 2025-11-08

## 2024-11-08 RX ORDER — ALBUTEROL SULFATE 5 MG/ML
2.5 SOLUTION RESPIRATORY (INHALATION) EVERY 6 HOURS PRN
Qty: 60 EACH | Refills: 0 | Status: SHIPPED | OUTPATIENT
Start: 2024-11-08

## 2024-11-08 RX ORDER — AMOXICILLIN AND CLAVULANATE POTASSIUM 875; 125 MG/1; MG/1
1 TABLET, FILM COATED ORAL EVERY 12 HOURS
Qty: 14 TABLET | Refills: 0 | Status: SHIPPED | OUTPATIENT
Start: 2024-11-08 | End: 2024-11-15

## 2024-11-08 NOTE — LETTER
November 8, 2024    Ilene Garber  [address] [place] [address]             [facility]  Primary Care  [address]  [place] [address]  Phone: [phone]  Fax: [phone]   November 8, 2024     Patient: Ilene Garber   YOB: 1975   Date of Visit: 11/8/2024       To Whom it May Concern:    Ilene Garber was seen virtually on 11/8/2024. She may return to work on 11/11/2024.    Please excuse her from any work missed.    If you have any questions or concerns, please don't hesitate to call.    Sincerely,         Uma Henry MD

## 2024-11-08 NOTE — PROGRESS NOTES
"[facility] Note    Chief Complaint    No chief complaint on file.      History of Present Illness      Ilene Garber is a 49 y.o.   AAF with HTN, Hypokalemia, GERD, Mild Intermittent Asthma, Dysmnenorrhea, RT Rotator cuff tear presents to  same day appt for URI sx's. Last visit - 7/29/24     The patient location is: Work  The chief complaint leading to consultation is: "same day appt for resp Sx's"    Visit type: audiovisual    Face to Face time with patient: 16 min.   16 minutes of total time spent on the encounter, which includes face to face time and non-face to face time preparing to see the patient (eg, review of tests), Obtaining and/or reviewing separately obtained history, Documenting clinical information in the electronic or other health record, Independently interpreting results (not separately reported) and communicating results to the patient/family/caregiver, or Care coordination (not separately reported).         Each patient to whom he or she provides medical services by telemedicine is:  (1) informed of the relationship between the physician and patient and the respective role of any other health care provider with respect to management of the patient; and (2) notified that he or she may decline to receive medical services by telemedicine and may withdraw from such care at any time.    Notes:     LONNY Sraman's - woke up 5 days ago  with sore throat , right ear pain, and HA. She took zyrtec and flonase. Next day was a little better and then that night worsened. Next day had congestion, diarrhea, Frntal Rodriguez was worse, sore throat was improvng, and cough started. + Dry cough - feels like she has something she needs to cough up but can't. + sinus pressure and postnasal drip. Home covid test neg just now. +wheezing and SOB. She uses her inhaler q 4 hrs - not helping. Diarrhea has resolved. No BA. Zyrtec no longer helps. Will change to allegra.  Will start prednisone 20 mg x 5 d and Rx " Augmentin x 7 days. Mucinex not helping. Rec supportive care.  Rec strict hand hygiene.  Gargle with salt water. Rec APAP/Ibuprofen prn T > 100.3/pain. RTC or alert MD if Symptoms persist/worsen.       Mild intermittent Asthma with acute exac - Allergies/weather change are a trigger.  Typically only needs Proair 5 times/yr.   Allergies - trigger. She has had flares with weather change. Zyrtec and flonase prn help. Will Rx nebs and Prednisone x 5 d.       Vit B12 def - Vitamin B12 level remains low at 243. Rec OTC Vitamin B12 1000 mcg daily and would not recommend taking this within 4 hrs of her Protonix.     Hypokalemia - resolved.  I think the medications adjustments we made helped with this.       Hematuria -resolved      Lip fasciculation - Resolved.       AUB/Dysmenorrhea - Fu by OB. Pelvic U/s - 12/1/23 - Uterine fibroid and amna ovarian cysts. Thick endometrium 1.4 cm was given medroxyprogesterone and if persists will need EMB or D&C.  Pelvic U /S - 3/22/24 - Two cyst of the left ovary the largest measuring 5.9 cm.      Constipation -  Started Linzess per Dr. Tate. She took for 1 wk and it worked too well.  Plans to d/w Dr. Tate.      Hosp -9/6/23 - 9/7/23 -  Abd pain x 3 days + Abd abscess/colitis. She was tx with flagyl,cipro,and Levaquin. Ultrasound-guided aspiration of anterior abdominal wall fluid collection as above yielding only 2 mL of serous fluid.  Peritoneal Cx - Anaerobic and aerobic both neg.  Feeling better since completing abx.  Fu by Dr. Thomason.     H/o gastritis - EGD 1/19/22- H. pylori gastritis Mild active proctitis. She was tx with Quad Tx. Repeat EGD 7/12/22 - 1 cm hiatal hernia.  Pt on Protonix daily. She is being fu by GI, Dr. Tate.   Avoids NSAIDs. Due for EGD -still needs  to reschedule.      H/o gallstones - CT abd 11/30/21 - Nitrogen containing cholelithiasis with small volume of nonspecific pericholecystic fluid.  No evidence of wall thickening or pericholecystic fat  "stranding. If there is clinical concern for cholecystitis, recommend HIDA scan. Few scattered prominent mesenteric lymph nodes without evidence of pathologic lymphadenopathy.GI referred to general surgery for evaluation of postprandial abdominal pain in gallstone seen on CT scan.  Pt is s/p lap delilah 2/7/22 with Dr. Thomason.     Iman umbilical hernia s/p repair - 9/22/22 -Doing well.      Iron def - Pt was iron deficient but not anemic and Dr. Tate had recommend that she take ferrous gluconate one 324mg pill once daily when on her cycle. She has not been on it. Resolved. Iron studies are normal.        Vit D def -  Resolved. Vit D level which was normal at 47. Pt completed Ergo per Dr. Tate 50,000 units ONCE A WEEK (every 7-days) for 12 wks. Rec Vitamin D3 4,000 units/d OTC.     Low HDL - Her Cholesterol looks ok but her HDL (good Cholesterol) is low.  I recommend exercise to increase this.  Repeat FLP next visit.      HSV genital - Pt on Valtrex prn.      HTN - We prev. switched her from HCTZ to Spironolactone 25 mg/d.  She is doing well. Her potassium improved. She is now on amlodipine 5 mg/d since 9/26/22.    Cont to monitor K+. Edema returned.      GERD- Pt on Protonix daily. Fu by GI, Dr. Tate. Planning for EGD.      RT shoulder pain - She was injured at work. She tore her rotator cuff.  "It comes and goes but has been ok lately". Avoid NSAIDS. She takes Tylenol prn, heating pad prn, voltaren gel prn. Xanaflex prn. she does some Ptx.      Acne - Pt is on Aldactone   25 mg/d.      Obesity - BMI - 30.73 down 5lb since last visit. Rec exercise every other day. Rec low carb diet.     HCM - Flu - 1/10/24;  Tdap - 7/10/23;  PCV 13 - ?;  PVX 23 - ?;  Shingrix - due at 52 y.o.;  RSV - due at 62 y.o.; COVID -19 Vaccine - (Moderna) # 1 - 2/10/21 and #2 - 3/11/21;  MGM - 9/14/23 -cyst -  repeat 1 yr;  PAP -9/122/23 - neg.;  hep C screen - 11/1/21 - neg.HIV Screen -11/1/21 -neg. ; C-scope - 1/19/22 - " diverticulosis, hemorrhoids, erythema - c/w mild active proctitis- repeat 10 yrs; EGD - 22 - Moderate chronic antral and oxyntic gastritis with mild activity Prev PCP - Dr. Rory Cooper; Ob/GYN - Dr. Dey;  GI - Dr. Tate; Well visit -  24       Patient Care Team:  Uma Henry MD as PCP - General (Internal Medicine)  Anderson Dey Jr., MD as Obstetrician (Obstetrics)     Health Maintenance:  Immunization History   Administered Date(s) Administered    COVID-19, MRNA, LN-S, PF (MODERNA FULL 0.5 ML DOSE) 02/10/2021, 2021    Influenza - Quadrivalent - MDCK - PF 2021, 2023    Tdap 07/10/2023      Health Maintenance   Topic Date Due    Mammogram  2024    Lipid Panel  2029    Colorectal Cancer Screening  2032    TETANUS VACCINE  07/10/2033    Hepatitis C Screening  Completed        Past Medical History:  Past Medical History:   Diagnosis Date    Asthma     GERD (gastroesophageal reflux disease)     H. pylori infection     Hypertension     Incisional hernia, without obstruction or gangrene 2022    Ovarian cyst        Past Surgical History:   has a past surgical history that includes  section; LAPAROSCOPY LYSIS OF ADHESIONS (2018); Bilateral tubal ligation; Esophagogastroduodenoscopy (N/A, 2022); Colonoscopy (N/A, 2022); Laparoscopic cholecystectomy (N/A, 2022); Breast biopsy (Left, ); Esophagogastroduodenoscopy (N/A, 2022); Robot-assisted repair of incisional hernia using da Vamshi Xi (N/A, 2022); Tubal ligation; Hernia repair (2022); and Cholecystectomy (2022).    Family History:  family history includes Breast cancer (age of onset: 42) in her sister; COPD in her mother; Coronary artery disease in her mother; Diabetes in her maternal grandmother; Hypertension in her maternal grandmother.     Social History:  Social History     Tobacco Use    Smoking status: Never     Passive exposure: Never     Smokeless tobacco: Never   Substance Use Topics    Alcohol use: Yes     Comment: Special occasions    Drug use: Never       Review of Systems   Constitutional:  Negative for chills, fever and night sweats.   HENT:  Positive for nasal congestion, ear pain, postnasal drip, sinus pressure/congestion and sore throat. Negative for rhinorrhea.    Respiratory:  Positive for cough, shortness of breath and wheezing.    Cardiovascular:  Positive for chest pain.   Gastrointestinal:  Positive for diarrhea and nausea. Negative for abdominal pain and vomiting.   Musculoskeletal:  Negative for arthralgias and myalgias.   Integumentary:  Negative for rash.   Neurological:  Positive for light-headedness and headaches.        Medications:    Current Outpatient Medications:     albuterol (PROVENTIL) 5 mg/mL nebulizer solution, Take 0.5 mLs (2.5 mg total) by nebulization every 6 (six) hours as needed for Shortness of Breath., Disp: 60 each, Rfl: 0    albuterol (VENTOLIN HFA) 90 mcg/actuation inhaler, Inhale 2 puffs into the lungs every 6 (six) hours as needed for Wheezing. Rescue, Disp: 18 g, Rfl: 2    amLODIPine (NORVASC) 5 MG tablet, Take 1 tablet (5 mg total) by mouth once daily., Disp: 90 tablet, Rfl: 0    amoxicillin-clavulanate 875-125mg (AUGMENTIN) 875-125 mg per tablet, Take 1 tablet by mouth every 12 (twelve) hours. for 7 days, Disp: 14 tablet, Rfl: 0    ciclopirox (PENLAC) 8 % Soln, Apply topically nightly. Apply to affected  toenail(s) and adjacent skin once daily in combination with weekly nail trimming and periodic nail debridement. Remove with alcohol every 7 days; continue therapy until nail clearance (max duration 48wks), Disp: 6.6 mL, Rfl: 2    clotrimazole-betamethasone 1-0.05% (LOTRISONE) cream, Apply topically 2 (two) times daily. PRN itch, Disp: 45 g, Rfl: 1    fluticasone propionate (FLONASE) 50 mcg/actuation nasal spray, 2 sprays (100 mcg total) by Each Nostril route once daily., Disp: 18.2 mL, Rfl: 8     pantoprazole (PROTONIX) 40 MG tablet, Take 1 tablet (40 mg total) by mouth before breakfast., Disp: 90 tablet, Rfl: 0    predniSONE (DELTASONE) 20 MG tablet, Take 1 po with breakfast x 5d, Disp: 5 tablet, Rfl: 0    spironolactone (ALDACTONE) 25 MG tablet, Take 1 tablet (25 mg total) by mouth once daily., Disp: 90 tablet, Rfl: 0    tiZANidine (ZANAFLEX) 4 MG tablet, TAKE 1 TABLET BY MOUTH EVERY DAY AT BEDTIME AS NEEDED FOR BACK PAIN, Disp: 30 tablet, Rfl: 0    tretinoin (RETIN-A) 0.05 % cream, Apply topically every evening., Disp: 45 g, Rfl: 5    valACYclovir (VALTREX) 500 MG tablet, TAKE 1 TABLET BY MOUTH TWICE DAILY FOR 3 DAYS AS NEEDED FOR  FLARE, Disp: 60 tablet, Rfl: 0     Allergies:  Review of patient's allergies indicates:  No Known Allergies    Physical Exam                    Physical Exam  Constitutional:       General: She is not in acute distress.     Appearance: Normal appearance. She is not ill-appearing, toxic-appearing or diaphoretic.      Comments: +hoarseness   HENT:      Head: Normocephalic and atraumatic.   Pulmonary:      Effort: No respiratory distress.   Neurological:      General: No focal deficit present.      Mental Status: She is alert and oriented to person, place, and time.   Psychiatric:         Mood and Affect: Mood normal.         Behavior: Behavior normal.          Laboratory:  CBC:  Recent Labs   Lab 09/06/23 1847 09/07/23  0555 09/29/23  1540 11/03/23  1635   WBC 8.64 7.49 7.48  --    RBC 4.26 4.05 4.16  --    Hemoglobin 12.5 11.7 L 11.7 L 12.2   Hematocrit 38.5 36.6 L 35.8 L  --    Platelets 264 246 325  --    MCV 90 90 86  --    MCH 29.3 28.9 28.1  --    MCHC 32.5 32.0 32.7  --        CMP:  Recent Labs   Lab 09/06/23 1847 09/07/23  0555 11/03/23  1635 01/10/24  1625 08/26/24  0825   Glucose 89   < > 82   < > 88   Calcium 8.9   < > 9.4   < > 9.4   Albumin 4.2  --  4.3  --  4.0   Total Protein 8.2  --  8.3  --  7.8   Sodium 133 L   < > 139   < > 136   Potassium 3.5   < > 3.3 L   <  > 3.7   CO2 24   < > 21 L   < > 24   Chloride 103   < > 105   < > 104   BUN 12   < > 11   < > 10   Creatinine 0.9   < > 1.0   < > 1.0   Alkaline Phosphatase 56  --  64  --  65   ALT 19  --  18  --  16   AST 23  --  20  --  18   Total Bilirubin 0.7  --  0.4  --  0.8    < > = values in this interval not displayed.           URINALYSIS:  Recent Labs   Lab 09/06/23  1853 09/15/23  1143 01/10/24  1615   Color, UA Yellow Yellow  --    Specific Gravity, UA 1.020 1.025  --    pH, UA 7.0 6.0  --    Protein, UA Trace A Trace A  --    Bacteria Negative  --  Rare   Nitrite, UA Negative Negative  --    Leukocytes, UA 3+ A Negative  --    Urobilinogen, UA 2.0-3.0 A  --   --    Hyaline Casts, UA 11 A  --   --         LIPIDS:  Recent Labs   Lab 01/03/22  0900 01/10/23  0825 01/10/24  1625 08/26/24  0825   TSH 1.510 1.316 1.107  --    HDL 35 L 41  --  47   Cholesterol 151 158  --  158   Triglycerides 93 76  --  76   LDL Cholesterol 97.4 101.8  --  95.8   HDL/Cholesterol Ratio 23.2 25.9  --  29.7   Non-HDL Cholesterol 116 117  --  111   Total Cholesterol/HDL Ratio 4.3 3.9  --  3.4       TSH:  Recent Labs   Lab 01/03/22  0900 01/10/23  0825 01/10/24  1625   TSH 1.510 1.316 1.107     Other:   Recent Labs   Lab 05/20/22  1445 11/14/22  0807 07/10/23  1535 09/29/23  1540 11/03/23  1635 08/26/24  0825   Follicle Stimulating Hormone  --   --   --  8.67  --   --    Estradiol  --   --   --  167  --   --    Vit D, 25-Hydroxy 24 L 31  --   --   --   --    Vitamin B-12  --   --   --   --   --  243   Ferritin  --   --    < >  --  75  --    Iron  --   --    < >  --  79  --    Transferrin  --   --    < >  --  266  --    TIBC  --   --    < >  --  394  --    Saturated Iron  --   --    < >  --  20  --     < > = values in this interval not displayed.     Recent Labs   Lab 11/03/23  1635   Hepatitis C Ab Non-reactive       Assessment/Plan     Ilene Garber is a 49 y.o.female with:    Acute bacterial sinusitis  -     predniSONE (DELTASONE) 20 MG  tablet; Take 1 po with breakfast x 5d  Dispense: 5 tablet; Refill: 0  -     amoxicillin-clavulanate 875-125mg (AUGMENTIN) 875-125 mg per tablet; Take 1 tablet by mouth every 12 (twelve) hours. for 7 days  Dispense: 14 tablet; Refill: 0  - Will Rx Prednisone x 5 d and Augmentin.     Mild intermittent asthma with acute exacerbation  -     albuterol (VENTOLIN HFA) 90 mcg/actuation inhaler; Inhale 2 puffs into the lungs every 6 (six) hours as needed for Wheezing. Rescue  Dispense: 18 g; Refill: 2  -     albuterol (PROVENTIL) 5 mg/mL nebulizer solution; Take 0.5 mLs (2.5 mg total) by nebulization every 6 (six) hours as needed for Shortness of Breath.  Dispense: 60 each; Refill: 0  - will rx Prednisone x 5 d. Cont Albuterol MDI and refill nebs to use Q6 prn. Seek care if worsen.  Alert MD if sx's persist.        Chronic conditions status updated as per HPI.  Other than changes above, cont current medications and maintain follow up with specialists.  Follow up in about 7 weeks (around 12/26/2024) for fu chronic issues or sooner if needed.      Uma Henry MD  [facility]          Answers submitted by the patient for this visit:  Cough Questionnaire (Submitted on 11/8/2024)  Chief Complaint: Cough  Chronicity: recurrent  Onset: in the past 7 days  Progression since onset: gradually worsening  Frequency: every few minutes  Cough characteristics: non-productive  ear congestion: No  heartburn: No  hemoptysis: No  nasal congestion: Yes  sweats: No  weight loss: No  Aggravated by: animals  asthma: Yes  bronchitis: Yes  Treatments tried: OTC cough suppressant  Improvement on treatment: mild

## 2024-11-13 ENCOUNTER — OFFICE VISIT (OUTPATIENT)
Dept: NEUROLOGY | Facility: CLINIC | Age: 49
End: 2024-11-13
Payer: COMMERCIAL

## 2024-11-13 DIAGNOSIS — F43.0 ANXIETY AS ACUTE REACTION TO GROSS STRESS: Primary | ICD-10-CM

## 2024-11-13 DIAGNOSIS — F41.1 ANXIETY AS ACUTE REACTION TO GROSS STRESS: Primary | ICD-10-CM

## 2024-11-13 PROCEDURE — 99499 UNLISTED E&M SERVICE: CPT | Mod: 95,,, | Performed by: PSYCHIATRY & NEUROLOGY

## 2024-11-13 PROCEDURE — 90791 PSYCH DIAGNOSTIC EVALUATION: CPT | Mod: 95,,, | Performed by: PSYCHIATRY & NEUROLOGY
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
## 2024-11-14 ENCOUNTER — HOSPITAL ENCOUNTER (OUTPATIENT)
Dept: RADIOLOGY | Facility: HOSPITAL | Age: 49
Discharge: HOME | End: 2024-11-14
Attending: INTERNAL MEDICINE
Payer: COMMERCIAL

## 2024-11-14 VITALS — HEIGHT: 67 IN | WEIGHT: 196 LBS | BODY MASS INDEX: 30.76 KG/M2

## 2024-11-14 DIAGNOSIS — Z12.31 SCREENING MAMMOGRAM, ENCOUNTER FOR: ICD-10-CM

## 2024-11-14 PROBLEM — F41.1 ANXIETY AS ACUTE REACTION TO GROSS STRESS: Status: ACTIVE | Noted: 2024-11-14

## 2024-11-14 PROBLEM — F32.A DEPRESSION: Status: ACTIVE | Noted: 2024-11-14

## 2024-11-14 PROBLEM — F43.0 ANXIETY AS ACUTE REACTION TO GROSS STRESS: Status: ACTIVE | Noted: 2024-11-14

## 2024-11-14 PROCEDURE — 77063 BREAST TOMOSYNTHESIS BI: CPT | Mod: 26,,, | Performed by: RADIOLOGY

## 2024-11-14 PROCEDURE — 77067 SCR MAMMO BI INCL CAD: CPT | Mod: 26,,, | Performed by: RADIOLOGY

## 2024-11-14 PROCEDURE — 77067 SCR MAMMO BI INCL CAD: CPT | Mod: TC,PO

## 2024-11-14 NOTE — PROGRESS NOTES
I sent pt a my chart message -  I reviewed your Mammogram -   There are scattered areas of fibroglandular density.  There are no new suspicious masses, microcalcifications, or architectural distortion.  Negative mammogram.  Recommendation repeat Annual screening mammography in 1 yr.  Dr. CONTRERAS

## 2024-11-22 NOTE — PATIENT INSTRUCTIONS
Ms. Garber has an incredibly busy work schedule and is also working very hard to support everyone in her life. She continues to function at a high level but may benefit from delegating some of her many responsibilities and setting boundaries with her family to ensure time for herself. Reducing the demands on her schedule could help optimize her functioning, alleviate stress, and improve her mood.  Targeted psychotherapy to learn coping skills and get extra support as she deals with your current stressors and the death of her mother is appropriate at this time. Initially she may benefit from supportive therapy and if appropriate she could benefit from skills-based Cognitive Behavioral Therapy (CBT) as it has a strong evidence base for treatment of depression and anxiety.  For management of stress/anxiety, I recommend daily practice of relaxation strategies (e.g., deep breathing, progressive muscle relaxation, mindfulness). I recommend first choosing one skill and practicing it ten minutes a day when you do not feel anxious or distressed. Then she can use these skills when she needs them. The following are just a few good examples:   The smartphone le [id number] can help guide you through a deep breathing exercise that may help with anxiety.   There are also excellent free videos for guided meditation, muscle relaxation, and mindfulness on [employer] or other video platforms.   I recommend trying some and finding something that works. For any of these skills, they only work if you practice them regularly.    Recommendations based on Ms. Garber's concerns:  Attention: Remember that inattention and lack of focus are major culprits to forgetting information so be sure and practice paying attention for adequate learning of information. If you rely on passive attention to remembering something (e.g., yeah, uh-huh approach), youll find you cannot recall it later. I recommend the following to improve attention, which may  aid in later recall:   Reduce distractions in the area as much as possible.  Look at the person as they are speaking to you.   Paraphrase as they are speaking  Write down important pieces of information   Ask them to repeat if you zone out.   Have them simplify and reduce information that you need to attend to during conversation.   Have visual cues to remind you if you need to do something later.  Language: Strategies to help with Word Finding. Not being able to find a word when you need it is a common and very annoying problem. It is not strictly a memory issue, but more a filing and retrieval issue. You know the word or name you want, but it cannot be found in your brain file. It is like having all the files in your file cabinet emptied on the floor. Every piece of information is there but finding it can be a challenge. One strategy that may help is working with a speech pathologist/therapist to learn techniques to decrease word finding problems. Some of those strategies/techniques include the following:  Use circumlocutions. Describe the object you're trying to name instead of naming it.  Recite you're A, B, Cs. Go through the alphabet to see if a letter triggers finding the word.  Picture it. Try to visualize the spelling or writing of the word.  Relax. The harder you try to force yourself to come up with the word, the more frustrated you get and the worse you function.   Write it down. In situations where using correct words is critical, such as communicating on the radio while flying, write down the key words so that they are in front of you if needed.  Use word association. For words or names you continually misfile and can't find, try to come up with a word association, something that reminds you of the word or name.  Write a script. Try scripting something important that you want to say. Either write it out or practice it beforehand, so that you get it right.  Play word games. Doing crossword  puzzles and playing word finding games may help your brain become more efficient at filing and retrieving words.   Sleep Hygiene: Poor sleep has a negative effect on cognition. Several strategies have been shown to improve sleep:   Caffeine intake in the afternoon and evening, as well as stuffing oneself at supper, can decrease the quality of restful sleep throughout the night.   Bedtime and wake-up times should be consistent every night and morning so the body becomes used to a single routine, even on the weekends.  Engage in daily physical activity, but not 2-3 hours before bedtime.   No technology use (television, computer, iPad) 1-2 hours before bed.   Have a wind down routine (e.g., soft lights in the house, bath before bed, reduced fluid intake, songs, reading, less noise) to promote sleep readiness.   Visit the www.sleepfoundation.org for more strategies.   Practice good cognitive/brain health hygiene:  Engage in regular exercise, which increases alertness and arousal and can improve attention and focus.  Consider lower impact exercises, such as yoga or light walking.  Get a good nights sleep, as this can enhance alertness and cognition.  Eat healthy foods and balanced meals. It is notable that research indicates certain nutrients may aid in brain function, such as B vitamins (especially B6, B12, and folic acid), antioxidants (such as vitamins C and E, and beta carotene), and Omega-3 fatty acids. Talk with your physician or nutritionist about whats right for you.   Keep your brain active. Find activities to stay mentally active, such as reading, games (cards, checkers), puzzles (crosswords, Sudoku, jig saw), crafts ([employer], woodworking), gardening, or participating in activities in the community.  Stay socially engaged. Continue staying active with your family and friends.    Managing Vascular Risk Factors: A personal history of disorders that affect the cardiovascular system (e.g., hypertension,  "hyperlipidemia, diabetes) can have a negative impact on brain functioning especially over many years. Therefore, it is very important for Ms. Garber to maintain good control over risk factors. The following is recommended:  Take all medications as prescribed and follow-up with recommendations above.  Get regular physical exercise to the extent that it is possible.   Follow a Mediterranean diet, which emphasizes plant-based foods, whole grains, fish and healthy fats, such as olive oil, and has been shown to be brain protective.   Check blood pressure, cholesterol levels, blood sugar, and others as appropriate.    Why does anxiety impact my thinking?    Your brain's number one job is to keep you alive, and our threat-response system is called the fight, flight, or freeze response.      When this gets turned on, our body automatically reacts within milliseconds -   Pupils dilate so we can have better vision of our surroundings  Skin becomes pale or flushed as the body redirects blood flow to the muscles, preparing us to run or fight   Heart rate and breathing become more rapid as our bodies try to take in as much oxygen as possible  Muscles tense and become primed for action, and may actually tremble or shake   Extra oxygen gets sent to the brain, increasing alertness and vigilance   Sight, hearing, and other senses get sharper     When we say "anxiety," what we are really describing is the subjective experience when this fight/flight/freeze system is activated. So, anxiety is a normal (and necessary!) human emotion. Without it, we probably wouldn't survive!     This system is designed to be FAST, but not necessarily ACCURATE. And that's ok! When there is a threat in your environment, it is usually more important that you respond quickly. For example, you may dodge out of the way of an oncoming car before you have time to really think about what's happening. In fact, the fight/flight/freeze response will OVERRIDE the " "more logical, thinking parts of your brain. It doesn't want you to waste time or energy wondering  why the bear is chasing you, it just wants you to run faster! That's why it feels impossible to think clearly when we are acutely stressed.     However, when this system gets dysregulated, we start to have problems.     If we are under chronic stress, the system is activated repeatedly. It becomes sensitized, turns on more easily, and becomes difficult to turn off. We have trouble sleeping because our body is on high alert all the time. Chronic stress is like a computer program running in the background, quietly draining all of our cognitive resources.    If we experience a trauma, this system gets turned up even more, and can get stuck in the "on" position. Often, people with a trauma history learn to live with this level of arousal after a while, and might not even notice it most of the time. However, when they get exposed to a new stressor - even a relatively minor one - they have less resources available to cope with it, because their  fight/flight/freeze sytem is ALREADY activated. So they are more prone to their body becoming overwhelmed, and may experience dissociative episodes, panic attacks, insomnia, stomach issues, and trouble thinking.      Effects of Anxiety on the Body          Central nervous system  Long-term anxiety and panic attacks can cause your brain to release stress hormones on a regular basis. This can increase the frequency of symptoms such as headaches, dizziness, and depression.    When you feel anxious and stressed, your brain floods your nervous system with hormones and chemicals designed to help you respond to a threat. Adrenaline and cortisol are two examples.    While helpful for the occasional high-stress event, long-term exposure to stress hormones can be more harmful to your physical health in the long run. For example, long-term exposure to cortisol can contribute to weight gain, " insomnia, and difficulty concentrating.    Cardiovascular system  Anxiety disorders can cause rapid heart rate, palpitations, and chest pain. You may also be at an increased risk of high blood pressure and heart disease. If you already have heart disease, anxiety disorders may raise the risk of coronary events.    Excretory and digestive systems  Anxiety also affects your excretory and digestive systems. You may have stomachaches, nausea, diarrhea, and other digestive issues. Loss of appetite can also occur.    There may be a connection between anxiety disorders and the development of irritable bowel syndrome (IBS) after a bowel infection. IBS can cause vomiting, diarrhea, or constipation.    Immune system  Anxiety can trigger your flight-or-fight stress response and release a flood of chemicals and hormones, like adrenaline, into your system.    In the short term, this increases your pulse and breathing rate, so your brain can get more oxygen. This prepares you to respond appropriately to an intense situation. Your immune system may even get a brief boost. With occasional stress, your body returns to normal functioning when the stress passes.    But if you repeatedly feel anxious and stressed or it lasts a long time, your body never gets the signal to return to normal functioning. This can weaken your immune system, leaving you more vulnerable to viral infections and frequent illnesses. Also, your regular vaccines may not work as well if you have anxiety.    Respiratory system  Anxiety causes rapid, shallow breathing. If you have chronic obstructive pulmonary disease (COPD), you may be at an increased risk of hospitalization from anxiety-related complications. Anxiety can also make asthma symptoms worse.    Other effects  Anxiety disorder can cause other symptoms, including:  Headaches  muscle tension  Insomnia  Depression  social isolation    If you have a trauma-related disorder, you may experience flashbacks,  reliving a traumatic experience over and over. You might get angry or startle easily, and perhaps become emotionally withdrawn. Other symptoms include nightmares, insomnia, and sadness.    [email or web address]    Anxiety Coping Strategies: Try these when you're feeling anxious or stressed:  Stress management: Limit potential triggers by managing stress levels. Keep an eye on pressures and deadlines, organize daunting tasks in to-do lists, and take enough time off from professional or educational obligations.  Take a time-out. Practice yoga, listen to music, meditate, get a massage, or learn relaxation techniques. Stepping back from the problem helps clear your head.  Eat well-balanced meals. Do not skip any meals. Do keep healthful, energy-boosting snacks on hand.  Limit alcohol and caffeine, which can aggravate anxiety and trigger panic attacks.  Get enough sleep. When stressed, your body needs additional sleep and rest.  Exercise daily: Physical exertion and an active lifestyle can improve self-image and trigger the release of chemicals in the brain that stimulate positive emotions.  Welcome humor. A good laugh goes a long way.  Maintain a positive attitude. Make an effort to replace negative thoughts with positive ones.  Get involved. Volunteer or find another way to be active in your community, which creates a support network and gives you a break from everyday stress.  Learn what triggers your anxiety. Is it work, family, school, or something else you can identify? Write in a journal when youre feeling stressed or anxious, and look for a pattern.  Support network: Talk to a person who is supportive, such as a family member or friend. Avoid storing up and suppressing anxious feelings as this can worsen anxiety disorders.  Relaxation techniques: Certain measures can help reduce signs of anxiety, including deep-breathing exercises, long baths, meditation, yoga, and resting in  the dark.  Exercises to replace negative thoughts with positive ones: Write down a list of any negative thoughts, and make another list of positive thoughts to replace them. Picturing yourself successfully facing and conquering a specific fear can also provide benefits if the anxiety symptoms link to a specific stressor.  Slow breathing. When youre anxious, your breathing becomes faster and shallower. Try deliberately slowing down your breathing. Count to three as you breathe in slowly - then count to three as you breathe out slowly.  Progressive muscle relaxation. Find a quiet location. Close your eyes and slowly tense and then relax each of your muscle groups from your toes to your head. Hold the tension for three seconds and then release quickly. This can help reduce the feelings of muscle tension that often comes with anxiety.  Stay in the present moment. Anxiety can make your thoughts live in a terrible future that hasnt happened yet. Try to bring yourself back to where you are.   Practice a grounding technique: Name 5 things you can see, 4 things you can hear, 3 things you can feel, 2 things you can smell, and 1 thing you can taste.  Healthy lifestyle. Keeping active, eating well, going out into nature, spending time with family and friends, reducing stress and doing the activities you enjoy are all effective in reducing anxiety and improving your wellbeing.     Take small acts of bravery. Avoiding what makes you anxious provides some relief in the short term, but can make you more anxious in the long term. Try approaching something that makes you anxious - even in a small way. The way through anxiety is by learning that what you fear isnt likely to happen - and if it does, youll be able to cope with it.  Challenge your self-talk. How you think affects how you feel. Anxiety can make you overestimate the danger in a situation and underestimate your ability to handle it. Try to think of different  interpretations to a situation thats making you anxious, rather than jumping to the worst-case scenario. Look at the facts for and against your thought being true.  Plan worry time. Its hard to stop worrying entirely so set aside some time to indulge your worries. Even 10 minutes each evening to write them down or go over them in your head can help stop your worries from taking over at other times.  Get to know your anxiety. Keep a diary of when its at its best - and worst. Find the patterns and plan your week - or day - to proactively manage your anxiety.  Learn from others. Talking with others who also experience anxiety - or are going through something similar - can help you feel less alone.   Be kind to yourself. Remember that you are not your anxiety. You are not weak. You are not inferior. Your body is trying to keep you safe. Be gentle with it.

## 2024-11-23 ENCOUNTER — PATIENT MESSAGE (OUTPATIENT)
Dept: OBSTETRICS AND GYNECOLOGY | Facility: CLINIC | Age: 49
End: 2024-11-23
Payer: COMMERCIAL

## 2024-11-26 ENCOUNTER — TELEPHONE (OUTPATIENT)
Dept: OBSTETRICS AND GYNECOLOGY | Facility: CLINIC | Age: 49
End: 2024-11-26
Payer: COMMERCIAL

## 2024-11-26 ENCOUNTER — PATIENT MESSAGE (OUTPATIENT)
Dept: PRIMARY CARE CLINIC | Facility: CLINIC | Age: 49
End: 2024-11-26
Payer: COMMERCIAL

## 2024-11-26 RX ORDER — FLUCONAZOLE 150 MG/1
150 TABLET ORAL DAILY
Qty: 1 TABLET | Refills: 1 | Status: SHIPPED | OUTPATIENT
Start: 2024-11-26

## 2024-11-26 NOTE — TELEPHONE ENCOUNTER
Please advise for request for Diflucan for yeast infection. Please send to [facility] on [address]. LOV 03/28/24.

## 2024-12-22 NOTE — PROGRESS NOTES
[facility] Note    Chief Complaint      Chief Complaint   Patient presents with    Follow-up       History of Present Illness      Ilene Garber is a 49 y.o.    AAF with HTN, Hypokalemia, GERD, Mild Intermittent Asthma, Dysmnenorrhea, RT Rotator cuff tear presents to fu same day appt for URI sx's. Last visit - 11/8/24     Mild intermittent Asthma with acute exac - Allergies/weather change are a trigger.  Typically only needs Proair 5 times/yr. Last used > 1 wk ago.   Allergies - trigger. She has had flares with weather change. Zyrtec daily and flonase prn help.       Vit B12 def - Vitamin B12 level remains low at 243. Rec OTC Vitamin B12 1000 mcg daily and would not recommend taking this within 4 hrs of her Protonix. Repeat B12.      Hypokalemia - resolved - 3.7 - in Aug.  I think the medications adjustments we made helped with this.       AUB/Dysmenorrhea - Fu by OB. Pelvic U/s - 12/1/23 - Uterine fibroid and amna ovarian cysts. Thick endometrium 1.4 cm was given medroxyprogesterone and if persists will need EMB or D&C.  Pelvic U/S - 3/22/24 - Two cyst of the left ovary the largest measuring 5.9 cm. Stable. Planning to fu.      Constipation -  Pt off Linzess per Dr. Tate. She took for 1 wk and it worked too well.  Plans to d/w Dr. Tate.      H/o gastritis - EGD 1/19/22- H. pylori gastritis Mild active proctitis. She was tx with Quad Tx. Repeat EGD 7/12/22 - 1 cm hiatal hernia.  Pt on Protonix daily. She is being fu by GI, Dr. Tate.   Avoids NSAIDs.       H/o gallstones - CT abd 11/30/21 - Nitrogen containing cholelithiasis with small volume of nonspecific pericholecystic fluid.  No evidence of wall thickening or pericholecystic fat stranding. If there is clinical concern for cholecystitis, recommend HIDA scan. Few scattered prominent mesenteric lymph nodes without evidence of pathologic lymphadenopathy.GI referred to general surgery for evaluation of postprandial abdominal pain in  "gallstone seen on CT scan.  Pt is s/p lap delilah 2/7/22 with Dr. Thomason. Hosp -9/6/23 - 9/7/23 -  Abd pain x 3 days + Abd abscess/colitis. She was tx with flagyl,cipro,and Levaquin. Ultrasound-guided aspiration of anterior abdominal wall fluid collection as above yielding only 2 mL of serous fluid.  Peritoneal Cx - Anaerobic and aerobic both neg.  Feeling better since completing abx.  Fu by Dr. Thomason.     Iman umbilical hernia s/p repair - 9/22/22 -Doing well.      Iron def - Pt was iron deficient but not anemic and Dr. Tate had recommend that she take ferrous gluconate one 324mg pill once daily when on her cycle Resolved. Iron studies are normal.        Vit D def -  Resolved. Vit D level which was normal at 47. Pt completed Ergo per Dr. Tate 50,000 units ONCE A WEEK (every 7-days) for 12 wks. Rec Vitamin D3 4,000 units/d OTC.     HSV genital - Pt on Valtrex prn.      HTN - We prev. switched her from HCTZ to Spironolactone 25 mg/d.  She is doing well. Her potassium improved. She is now on amlodipine 5 mg/d since 9/26/22.    Cont to monitor K+. Edema returned.      GERD- Pt on Protonix daily. Fu by GI, Dr. Tate.       RT shoulder pain - She was injured at work. She tore her rotator cuff.  "It comes and goes but has been ok lately". Avoid NSAIDS. She takes Tylenol prn, heating pad prn, voltaren gel prn. Xanaflex prn. She does some Ptx. Rt shoulder fell over yesterday playing with her grandson. Rt shoulder fell over yesterday playing with her grandson.     Acne - Pt is on Aldactone   25 mg/d.      Obesity - BMI - 31.84 - Up7lb since last visit. Rec exercise every other day. Rec low carb diet.     HCM - Flu - none - admin 12/26/24;  Tdap - 7/10/23;  PCV 13 - ?;  PVX 23 - ?;  Shingrix - due at 52 y.o.;  RSV - due at 62 y.o.; COVID -19 Vaccine - (Moderna) # 1 - 2/10/21 and #2 - 3/11/21;  MGM  - 11/14/24-  repeat 1 yr;  PAP -9/122/23 - neg.;  hep C screen - 11/1/21 - neg.HIV Screen -11/1/21 -neg. ; " C-scope - 22 - diverticulosis, hemorrhoids, erythema - c/w mild active proctitis- repeat 10 yrs; EGD - 22 - Moderate chronic antral and oxyntic gastritis with mild activity Prev PCP - Dr. Rory Cooper; Ob/GYN - Dr. Dey;  GI - Dr. Tate; Well visit -  24    Patient Care Team:  Uma Henry MD as PCP - General (Internal Medicine)  Anderson Dey Jr., MD as Obstetrician (Obstetrics)     Health Maintenance:  Immunization History   Administered Date(s) Administered    COVID-19, MRNA, LN-S, PF (MODERNA FULL 0.5 ML DOSE) 02/10/2021, 2021    Influenza - Quadrivalent - MDCK - PF 2021, 2023    Influenza - Trivalent - Fluarix, Flulaval, Fluzone, Afluria - PF 2024    Tdap 07/10/2023      Health Maintenance   Topic Date Due    Pneumococcal Vaccines (Age 0-49) (1 of 2 - PCV) Never done    Hemoglobin A1c (Diabetic Prevention Screening)  Never done    COVID-19 Vaccine (3 - - season) 2024    Mammogram  2025    Cervical Cancer Screening  2028    Lipid Panel  2029    Colorectal Cancer Screening  2032    TETANUS VACCINE  07/10/2033    RSV Vaccine (Age 60+ and Pregnant patients) (1 - 1-dose 75+ series) 2052    Hepatitis C Screening  Completed    Influenza Vaccine  Completed    HIV Screening  Completed        Past Medical History:  Past Medical History:   Diagnosis Date    Asthma     GERD (gastroesophageal reflux disease)     H. pylori infection     Hypertension     Incisional hernia, without obstruction or gangrene 2022    Ovarian cyst        Past Surgical History:   has a past surgical history that includes  section; LAPAROSCOPY LYSIS OF ADHESIONS (2018); Bilateral tubal ligation; Esophagogastroduodenoscopy (N/A, 2022); Colonoscopy (N/A, 2022); Laparoscopic cholecystectomy (N/A, 2022); Breast biopsy (Left, ); Esophagogastroduodenoscopy (N/A, 2022); Robot-assisted repair of incisional hernia  using da Vamshi Xi (N/A, 09/22/2022); Tubal ligation; Hernia repair (9/22/2022); and Cholecystectomy (2/7/2022).    Family History:  family history includes Breast cancer (age of onset: 42) in her sister; COPD in her mother; Coronary artery disease in her mother; Diabetes in her maternal grandmother; Hypertension in her maternal grandmother.     Social History:  Social History     Tobacco Use    Smoking status: Never     Passive exposure: Never    Smokeless tobacco: Never   Substance Use Topics    Alcohol use: Not Currently     Comment: Special occasions    Drug use: Never       Review of Systems   Constitutional:  Negative for chills, diaphoresis and fever.   HENT:  Positive for hearing loss. Negative for tinnitus.             Eyes:  Negative for visual disturbance.        Has ophtho appt tomorrow - glaucoma suspect. Wears glasses   Respiratory:  Negative for chest tightness and shortness of breath.    Cardiovascular:  Negative for chest pain and palpitations.   Gastrointestinal:  Positive for constipation. Negative for abdominal pain, blood in stool, diarrhea, nausea and vomiting.   Endocrine: Negative for cold intolerance, heat intolerance and polydipsia.   Genitourinary:  Negative for bladder incontinence, difficulty urinating, frequency and hematuria.   Musculoskeletal:  Positive for arthralgias.        Rt shoulder fell over yesterday playing with her grandson.   Neurological:  Positive for numbness and headaches. Negative for dizziness.        Tingling in rt hand when wakes up off and on - resolves quickly when changes position.    Psychiatric/Behavioral:  Negative for depressed mood. The patient is not nervous/anxious.         Medications:    Current Outpatient Medications:     albuterol (PROVENTIL) 5 mg/mL nebulizer solution, Take 0.5 mLs (2.5 mg total) by nebulization every 6 (six) hours as needed for Shortness of Breath., Disp: 60 each, Rfl: 0    albuterol (VENTOLIN HFA) 90 mcg/actuation inhaler, Inhale 2  "puffs into the lungs every 6 (six) hours as needed for Wheezing. Rescue, Disp: 18 g, Rfl: 2    ciclopirox (PENLAC) 8 % Soln, Apply topically nightly. Apply to affected  toenail(s) and adjacent skin once daily in combination with weekly nail trimming and periodic nail debridement. Remove with alcohol every 7 days; continue therapy until nail clearance (max duration 48wks), Disp: 6.6 mL, Rfl: 2    clotrimazole-betamethasone 1-0.05% (LOTRISONE) cream, Apply topically 2 (two) times daily. PRN itch, Disp: 45 g, Rfl: 1    fluticasone propionate (FLONASE) 50 mcg/actuation nasal spray, 2 sprays (100 mcg total) by Each Nostril route once daily., Disp: 18.2 mL, Rfl: 8    tiZANidine (ZANAFLEX) 4 MG tablet, TAKE 1 TABLET BY MOUTH EVERY DAY AT BEDTIME AS NEEDED FOR BACK PAIN, Disp: 30 tablet, Rfl: 0    tretinoin (RETIN-A) 0.05 % cream, Apply topically every evening., Disp: 45 g, Rfl: 5    valACYclovir (VALTREX) 500 MG tablet, TAKE 1 TABLET BY MOUTH TWICE DAILY FOR 3 DAYS AS NEEDED FOR  FLARE, Disp: 60 tablet, Rfl: 0    [START ON 2/1/2025] amLODIPine (NORVASC) 5 MG tablet, Take 1 tablet (5 mg total) by mouth once daily., Disp: 90 tablet, Rfl: 2    influenza (FLUARIX TRIV 7501-2408, PF,) 45 mcg (15 mcg x 3)/0.5 mL IM vaccine (> or = 6 mo), Inject 0.5 mLs into the muscle once. for 1 dose, Disp: 0.5 mL, Rfl: 0    [START ON 2/1/2025] pantoprazole (PROTONIX) 40 MG tablet, Take 1 tablet (40 mg total) by mouth before breakfast., Disp: 90 tablet, Rfl: 2    [START ON 2/1/2025] spironolactone (ALDACTONE) 25 MG tablet, Take 1 tablet (25 mg total) by mouth once daily., Disp: 90 tablet, Rfl: 2     Allergies:  Review of patient's allergies indicates:  No Known Allergies    Physical Exam      Vital Signs  Temp: 98 °F (36.7 °C)  Temp Source: Oral  Pulse: 73  Resp: 16  SpO2: 98 %  BP: 130/80  Pain Score:   4  Pain Loc: Shoulder  Height and Weight  Height: 5' 7" (170.2 cm)  Weight: 92.2 kg (203 lb 4.2 oz)  BSA (Calculated - sq m): 2.09 sq " meters  BMI (Calculated): 31.8  Weight in (lb) to have BMI = 25: 159.3             Physical Exam  Constitutional:       Appearance: Normal appearance. She is obese. She is not toxic-appearing or diaphoretic.   HENT:      Head: Normocephalic.      Right Ear: Tympanic membrane normal.      Left Ear: Tympanic membrane normal.   Eyes:      Extraocular Movements: Extraocular movements intact.      Conjunctiva/sclera: Conjunctivae normal.      Pupils: Pupils are equal, round, and reactive to light.      Comments: Small superficial laceration under Left eye. EOMI   Cardiovascular:      Rate and Rhythm: Normal rate and regular rhythm.      Heart sounds: Normal heart sounds. No murmur heard.  Pulmonary:      Effort: No respiratory distress.      Breath sounds: Normal breath sounds. No wheezing.   Abdominal:      General: Bowel sounds are normal. There is no distension.      Palpations: Abdomen is soft.      Tenderness: There is no abdominal tenderness. There is no guarding or rebound.   Musculoskeletal:         General: Normal range of motion.      Comments: FROM of RT shoulder.    Skin:     General: Skin is warm.   Neurological:      General: No focal deficit present.      Mental Status: She is alert and oriented to person, place, and time.   Psychiatric:         Mood and Affect: Mood normal.         Behavior: Behavior normal.          Laboratory:  CBC:  Recent Labs   Lab 09/06/23  1847 09/07/23  0555 09/29/23  1540 11/03/23  1635   WBC 8.64 7.49 7.48  --    RBC 4.26 4.05 4.16  --    Hemoglobin 12.5 11.7 L 11.7 L 12.2   Hematocrit 38.5 36.6 L 35.8 L  --    Platelets 264 246 325  --    MCV 90 90 86  --    MCH 29.3 28.9 28.1  --    MCHC 32.5 32.0 32.7  --        CMP:  Recent Labs   Lab 09/06/23  1847 09/07/23  0555 11/03/23  1635 01/10/24  1625 08/26/24  0825   Glucose 89   < > 82   < > 88   Calcium 8.9   < > 9.4   < > 9.4   Albumin 4.2  --  4.3  --  4.0   Total Protein 8.2  --  8.3  --  7.8   Sodium 133 L   < > 139   < > 136    Potassium 3.5   < > 3.3 L   < > 3.7   CO2 24   < > 21 L   < > 24   Chloride 103   < > 105   < > 104   BUN 12   < > 11   < > 10   Creatinine 0.9   < > 1.0   < > 1.0   Alkaline Phosphatase 56  --  64  --  65   ALT 19  --  18  --  16   AST 23  --  20  --  18   Total Bilirubin 0.7  --  0.4  --  0.8    < > = values in this interval not displayed.           URINALYSIS:  Recent Labs   Lab 09/06/23  1853 09/15/23  1143 01/10/24  1615   Color, UA Yellow Yellow  --    Specific Gravity, UA 1.020 1.025  --    pH, UA 7.0 6.0  --    Protein, UA Trace A Trace A  --    Bacteria Negative  --  Rare   Nitrite, UA Negative Negative  --    Leukocytes, UA 3+ A Negative  --    Urobilinogen, UA 2.0-3.0 A  --   --    Hyaline Casts, UA 11 A  --   --         LIPIDS:  Recent Labs   Lab 01/03/22  0900 01/10/23  0825 01/10/24  1625 08/26/24  0825   TSH 1.510 1.316 1.107  --    HDL 35 L 41  --  47   Cholesterol 151 158  --  158   Triglycerides 93 76  --  76   LDL Cholesterol 97.4 101.8  --  95.8   HDL/Cholesterol Ratio 23.2 25.9  --  29.7   Non-HDL Cholesterol 116 117  --  111   Total Cholesterol/HDL Ratio 4.3 3.9  --  3.4       TSH:  Recent Labs   Lab 01/03/22  0900 01/10/23  0825 01/10/24  1625   TSH 1.510 1.316 1.107            Other:   Recent Labs   Lab 05/20/22  1445 11/14/22  0807 07/10/23  1535 09/29/23  1540 11/03/23  1635 08/26/24  0825   Follicle Stimulating Hormone  --   --   --  8.67  --   --    Estradiol  --   --   --  167  --   --    Vit D, 25-Hydroxy 24 L 31  --   --   --   --    Vitamin B-12  --   --   --   --   --  243   Ferritin  --   --    < >  --  75  --    Iron  --   --    < >  --  79  --    Transferrin  --   --    < >  --  266  --    TIBC  --   --    < >  --  394  --    Saturated Iron  --   --    < >  --  20  --     < > = values in this interval not displayed.     Recent Labs   Lab 11/03/23  1635   Hepatitis C Ab Non-reactive       Assessment/Plan     Ilene Garber is a 49 y.o.female with:    Hypertension, unspecified  type  -     amLODIPine (NORVASC) 5 MG tablet; Take 1 tablet (5 mg total) by mouth once daily.  Dispense: 90 tablet; Refill: 2  - Controlled.  Cont current.     Hypokalemia  -     spironolactone (ALDACTONE) 25 MG tablet; Take 1 tablet (25 mg total) by mouth once daily.  Dispense: 90 tablet; Refill: 2  - Stable.  Cont current regimen.    Mild intermittent asthma with acute exacerbation  - Stable.  Cont current regimen.    Gastroesophageal reflux disease, unspecified whether esophagitis present  - Stable.  Cont current regimen.    Vitamin B12 deficiency  -     Vitamin B12; Future; Expected date: 12/26/2024  - Cont current.  Repeat B12.     Screening mammogram, encounter for  -     Mammo Digital Screening Bilat w/ Marcos; Future; Expected date: 11/15/2025    Other orders  -     CBC Auto Differential; Future; Expected date: 12/26/2024  -     Comprehensive Metabolic Panel; Future; Expected date: 12/26/2024  -     Hemoglobin A1C; Future; Expected date: 12/26/2024  -     TSH; Future; Expected date: 12/26/2024    Other orders  -     pantoprazole (PROTONIX) 40 MG tablet; Take 1 tablet (40 mg total) by mouth before breakfast.  Dispense: 90 tablet; Refill: 2         Chronic conditions status updated as per HPI.  Other than changes above, cont current medications and maintain follow up with specialists. Follow up in about 7 months (around 8/1/2025) for well visit or sooner if needed.      Uma Henry MD  [facility]

## 2024-12-26 ENCOUNTER — OFFICE VISIT (OUTPATIENT)
Dept: PRIMARY CARE CLINIC | Facility: CLINIC | Age: 49
End: 2024-12-26
Payer: COMMERCIAL

## 2024-12-26 VITALS
SYSTOLIC BLOOD PRESSURE: 130 MMHG | OXYGEN SATURATION: 98 % | HEART RATE: 73 BPM | TEMPERATURE: 98 F | DIASTOLIC BLOOD PRESSURE: 80 MMHG | HEIGHT: 67 IN | WEIGHT: 203.25 LBS | BODY MASS INDEX: 31.9 KG/M2 | RESPIRATION RATE: 16 BRPM

## 2024-12-26 DIAGNOSIS — Z13.220 SCREENING FOR LIPOID DISORDERS: ICD-10-CM

## 2024-12-26 DIAGNOSIS — E87.6 HYPOKALEMIA: ICD-10-CM

## 2024-12-26 DIAGNOSIS — Z00.00 NORMAL PHYSICAL EXAM, ROUTINE: ICD-10-CM

## 2024-12-26 DIAGNOSIS — I10 HYPERTENSION, UNSPECIFIED TYPE: Primary | ICD-10-CM

## 2024-12-26 DIAGNOSIS — E53.8 VITAMIN B12 DEFICIENCY: ICD-10-CM

## 2024-12-26 DIAGNOSIS — K21.9 GASTROESOPHAGEAL REFLUX DISEASE, UNSPECIFIED WHETHER ESOPHAGITIS PRESENT: ICD-10-CM

## 2024-12-26 DIAGNOSIS — Z12.31 SCREENING MAMMOGRAM, ENCOUNTER FOR: ICD-10-CM

## 2024-12-26 DIAGNOSIS — J45.21 MILD INTERMITTENT ASTHMA WITH ACUTE EXACERBATION: ICD-10-CM

## 2024-12-26 PROCEDURE — 99999 PR PBB SHADOW E&M-EST. PATIENT-LVL V: CPT | Mod: PBBFAC,,, | Performed by: INTERNAL MEDICINE

## 2024-12-26 RX ORDER — AMLODIPINE BESYLATE 5 MG/1
5 TABLET ORAL DAILY
Qty: 90 TABLET | Refills: 2 | Status: SHIPPED | OUTPATIENT
Start: 2025-02-01 | End: 2026-02-01

## 2024-12-26 RX ORDER — PANTOPRAZOLE SODIUM 40 MG/1
40 TABLET, DELAYED RELEASE ORAL
Qty: 90 TABLET | Refills: 2 | Status: SHIPPED | OUTPATIENT
Start: 2025-02-01

## 2024-12-26 RX ORDER — SPIRONOLACTONE 25 MG/1
25 TABLET ORAL DAILY
Qty: 90 TABLET | Refills: 2 | Status: SHIPPED | OUTPATIENT
Start: 2025-02-01

## 2024-12-26 NOTE — LETTER
December 26, 2024      [facility]  [address]  [place] [address]  Phone: [phone]  Fax: [phone]       Patient: Ilene Garber   YOB: 1975  Date of Visit: 12/26/2024    To Whom It May Concern:    Shavon Garber  was at [facility] on 12/26/2024. The patient may return to work/school on 12/27/24 with no restrictions. If you have any questions or concerns, or if I can be of further assistance, please do not hesitate to contact me.    Sincerely,    Jennifer Shah MA

## 2025-03-06 ENCOUNTER — OFFICE VISIT (OUTPATIENT)
Dept: OBSTETRICS AND GYNECOLOGY | Facility: CLINIC | Age: 50
End: 2025-03-06
Payer: COMMERCIAL

## 2025-03-06 VITALS
DIASTOLIC BLOOD PRESSURE: 74 MMHG | BODY MASS INDEX: 31.32 KG/M2 | WEIGHT: 199.94 LBS | SYSTOLIC BLOOD PRESSURE: 132 MMHG

## 2025-03-06 DIAGNOSIS — R10.2 PELVIC PAIN: ICD-10-CM

## 2025-03-06 DIAGNOSIS — Z12.4 SCREENING FOR MALIGNANT NEOPLASM OF CERVIX: ICD-10-CM

## 2025-03-06 DIAGNOSIS — N93.9 ABNORMAL UTERINE BLEEDING (AUB): Primary | ICD-10-CM

## 2025-03-06 PROCEDURE — 3078F DIAST BP <80 MM HG: CPT | Mod: CPTII,S$GLB,, | Performed by: OBSTETRICS & GYNECOLOGY

## 2025-03-06 PROCEDURE — 99999 PR PBB SHADOW E&M-EST. PATIENT-LVL III: CPT | Mod: PBBFAC,,, | Performed by: OBSTETRICS & GYNECOLOGY

## 2025-03-06 PROCEDURE — 1159F MED LIST DOCD IN RCRD: CPT | Mod: CPTII,S$GLB,, | Performed by: OBSTETRICS & GYNECOLOGY

## 2025-03-06 PROCEDURE — 87624 HPV HI-RISK TYP POOLED RSLT: CPT | Performed by: OBSTETRICS & GYNECOLOGY

## 2025-03-06 PROCEDURE — 3075F SYST BP GE 130 - 139MM HG: CPT | Mod: CPTII,S$GLB,, | Performed by: OBSTETRICS & GYNECOLOGY

## 2025-03-06 PROCEDURE — 3008F BODY MASS INDEX DOCD: CPT | Mod: CPTII,S$GLB,, | Performed by: OBSTETRICS & GYNECOLOGY

## 2025-03-06 PROCEDURE — 99214 OFFICE O/P EST MOD 30 MIN: CPT | Mod: S$GLB,,, | Performed by: OBSTETRICS & GYNECOLOGY

## 2025-03-06 NOTE — PROGRESS NOTES
"    Subjective:   Chief Complaint:  No chief complaint on file.       Patient ID: Ilene Garber is a  49 y.o. female.    Contraception:  Bilateral tubal sterilization    Date: 1/3/2023    The patient presents today due to the following:  Since her last evaluation she has continued to have irregular menses.  Ultrasound was ordered and the patient presents today to discuss the results as well as further evaluation and potential treatment options.    Date: 3/28/2024    The patient presents today for follow-up.  She was last seen as above.  In light of her bleeding issues and findings of a thickened endometrial cavity the patient was placed on oral progesterone and elected a follow up ultrasound.    She presents today to discuss the results     Since her last evaluation she reports doing "very well".  No pelvic pain or abnormal bleeding issues.    GYN & OB History  Patient's last menstrual period was 2025.     Date of Last Pap: 2023  OB History          3    Para   3    Term   3            AB        Living   3         SAB        IAB        Ectopic        Multiple        Live Births   3           Obstetric Comments    x 3               Allergies: Review of patient's allergies indicates:  No Known Allergies    Past Medical History:   Diagnosis Date    Asthma     GERD (gastroesophageal reflux disease)     H. pylori infection     Hypertension     Incisional hernia, without obstruction or gangrene 2022    Ovarian cyst        Past Surgical History:   Procedure Laterality Date    BILATERAL TUBAL LIGATION      BREAST BIOPSY Left     benign     SECTION      x 3    CHOLECYSTECTOMY  2022    COLONOSCOPY N/A 2022    Procedure: COLONOSCOPY;  Surgeon: Cirilo Tate MD;  Location: [address];  Service: Endoscopy;  Laterality: N/A;  Constipation bowel prep    ESOPHAGOGASTRODUODENOSCOPY N/A 2022    Procedure: EGD (ESOPHAGOGASTRODUODENOSCOPY);  " Surgeon: Cirilo Tate MD;  Location: [facility] ENDO (4TH FLR);  Service: Endoscopy;  Laterality: N/A;  COVID test at [place] on 1/16-GT    ESOPHAGOGASTRODUODENOSCOPY N/A 07/12/2022    Procedure: EGD (ESOPHAGOGASTRODUODENOSCOPY);  Surgeon: Cirilo Tate MD;  Location: [facility] ENDO (4TH FLR);  Service: Endoscopy;  Laterality: N/A;    HERNIA REPAIR  9/22/2022    LAPAROSCOPIC CHOLECYSTECTOMY N/A 02/07/2022    Procedure: CHOLECYSTECTOMY, LAPAROSCOPIC;  Surgeon: Chau Thomason Jr., MD;  Location: [facility] OR 2ND FLR;  Service: General;  Laterality: N/A;    LAPAROSCOPY LYSIS OF ADHESIONS  11/28/2018    Myomectomy    ROBOT-ASSISTED REPAIR OF INCISIONAL HERNIA USING DA LOUIE XI N/A 09/22/2022    Procedure: XI ROBOTIC REPAIR, HERNIA, INCISIONAL w/ mesh;  Surgeon: Chau Thomason Jr., MD;  Location: [facility] OR 2ND FLR;  Service: General;  Laterality: N/A;    TUBAL LIGATION         Medications    Current Outpatient Medications:     albuterol (PROVENTIL) 5 mg/mL nebulizer solution, Take 0.5 mLs (2.5 mg total) by nebulization every 6 (six) hours as needed for Shortness of Breath., Disp: 60 each, Rfl: 0    albuterol (VENTOLIN HFA) 90 mcg/actuation inhaler, Inhale 2 puffs into the lungs every 6 (six) hours as needed for Wheezing. Rescue, Disp: 18 g, Rfl: 2    amLODIPine (NORVASC) 5 MG tablet, Take 1 tablet (5 mg total) by mouth once daily., Disp: 90 tablet, Rfl: 2    ciclopirox (PENLAC) 8 % Soln, Apply topically nightly. Apply to affected  toenail(s) and adjacent skin once daily in combination with weekly nail trimming and periodic nail debridement. Remove with alcohol every 7 days; continue therapy until nail clearance (max duration 48wks), Disp: 6.6 mL, Rfl: 2    clotrimazole-betamethasone 1-0.05% (LOTRISONE) cream, Apply topically 2 (two) times daily. PRN itch, Disp: 45 g, Rfl: 1    fluticasone propionate (FLONASE) 50 mcg/actuation nasal spray, 2 sprays (100 mcg total) by Each Nostril route once daily., Disp: 18.2 mL, Rfl:  8    pantoprazole (PROTONIX) 40 MG tablet, Take 1 tablet (40 mg total) by mouth before breakfast., Disp: 90 tablet, Rfl: 2    spironolactone (ALDACTONE) 25 MG tablet, Take 1 tablet (25 mg total) by mouth once daily., Disp: 90 tablet, Rfl: 2    tiZANidine (ZANAFLEX) 4 MG tablet, TAKE 1 TABLET BY MOUTH EVERY DAY AT BEDTIME AS NEEDED FOR BACK PAIN, Disp: 30 tablet, Rfl: 0    tretinoin (RETIN-A) 0.05 % cream, Apply topically every evening., Disp: 45 g, Rfl: 5    valACYclovir (VALTREX) 500 MG tablet, TAKE 1 TABLET BY MOUTH TWICE DAILY FOR 3 DAYS AS NEEDED FOR  FLARE, Disp: 60 tablet, Rfl: 0     Social History     Tobacco Use    Smoking status: Never     Passive exposure: Never    Smokeless tobacco: Never   Substance Use Topics    Alcohol use: Not Currently     Comment: Special occasions    Drug use: Never       Family History   Problem Relation Name Age of Onset    COPD Mother Karo Wiley     Coronary artery disease Mother Karo Wiley         s/p CABG    Breast cancer Sister  42    Hypertension Maternal Grandmother      Diabetes Maternal Grandmother      Colon cancer Neg Hx      Ovarian cancer Neg Hx      Celiac disease Neg Hx      Cirrhosis Neg Hx      Crohn's disease Neg Hx      Esophageal cancer Neg Hx      Inflammatory bowel disease Neg Hx      Liver cancer Neg Hx      Liver disease Neg Hx      Rectal cancer Neg Hx      Stomach cancer Neg Hx      Ulcerative colitis Neg Hx      Pancreatic cancer Neg Hx      Kidney cancer Neg Hx      Bladder Cancer Neg Hx      Uterine cancer Neg Hx         Review of Systems (at today's evaluation)  Review of Systems   Constitutional:  Negative for fever and unexpected weight change.   Respiratory: Negative.     Cardiovascular:  Negative for chest pain and palpitations.   Gastrointestinal:  Negative for nausea and vomiting.   Genitourinary:  Negative for dysuria, hematuria and urgency.        Gyn as per HPI   Neurological:  Negative for headaches.        Objective:      Vitals:     03/06/25 1540   BP: 132/74     Physical Exam:   Constitutional: She appears well-developed and well-nourished.    HENT:   Head: Normocephalic.     Neck: No thyroid mass present.    Cardiovascular:  Normal rate.             Pulmonary/Chest: Effort normal. No respiratory distress.        Abdominal: Soft. There is no abdominal tenderness.             Musculoskeletal: Normal range of motion.      Right lower leg: No edema.      Left lower leg: No edema.       Neurological: She is alert.   No gross lesions noted.    Skin: Skin is warm and dry.    Psychiatric: She has a normal mood and affect. Her speech is normal and behavior is normal. Mood normal.       Assessment:        1. Abnormal uterine bleeding (AUB)    2. Pelvic pain    3. Screening for malignant neoplasm of cervix          Plan:      Abnormal uterine bleeding (AUB)  -     US Pelvis Comp with Transvag NON-OB (xpd; Future; Expected date: 03/06/2025  -     Luteinizing Hormone; Future; Expected date: 03/06/2025  -     Follicle Stimulating Hormone; Future; Expected date: 03/06/2025  -     Estradiol; Future; Expected date: 03/06/2025  -     Ferritin; Future; Expected date: 03/06/2025  -     CBC Auto Differential; Future; Expected date: 03/06/2025    Pelvic pain  -     US Pelvis Comp with Transvag NON-OB (xpd; Future; Expected date: 03/06/2025    Screening for malignant neoplasm of cervix  -     HPV High Risk Genotypes, PCR  -     Liquid-Based Pap Smear, Screening        No follow-ups on file.     The above was reviewed discussed with the patient.    We discussed her improved status following treatment with oral progesterone.    We discussed the fact that follow-up ultrasound noted an improved endometrial cavity.    From a gynecologic standpoint the patient is currently doing well without complaints.  She will continue with conservative management.      The patient's questions were answered, and she is in agreement with the current plan.     Ubaldo Gonzalez  MD  Department OBGYN  [facility]

## 2025-03-07 PROBLEM — E87.6 HYPOKALEMIA: Status: RESOLVED | Noted: 2024-07-29 | Resolved: 2025-03-07

## 2025-03-07 NOTE — PROGRESS NOTES
[facility] Note    Pertinent Med & GYN Problem List        Subjective:   Chief Complaint:  No chief complaint on file.    Date: 3/6/2025     Patient ID: Ilene Garber is a  49 y.o. female.    Contraception: Bilateral tubal sterilization    Patient's last menstrual period was 2025.    The patient presents today due to the following:  She was last seen in 2024 due to bleeding issues.  She reports doing well until 2025.  In January she reports a very heavy menses while in February she has had irregular bleeding throughout the month.  In addition over the last three months she reports episodes of a sharp stabbing lower abdominal pain with the right greater than the left.    Pap smear history:  Positive history of abnormal Pap smear.  Last Pap smear: 2023    Mammogram: Up-to-date 2024  Colon cancer screening:  Up-to-date 2022  Personal or family history of bleeding or blood clotting disorders:  Negative     Family history:  Breast cancer:  POSITIVE - Sister  Colon cancer:  Negative   Gyn related cancer:  Negative     GYN & OB History  LMP: Patient's last menstrual period was 2025.     Age of Menarche:14  Age at first pregnancy:19   Age at first live birth:19  Number of months breastfeeding:    Age at Menopause:    Comments:     OB History          3    Para   3    Term   3            AB        Living   3         SAB        IAB        Ectopic        Multiple        Live Births   3           Obstetric Comments    x 3               Allergies: Review of patient's allergies indicates:  No Known Allergies    Past Medical History:   Diagnosis Date    Asthma     GERD (gastroesophageal reflux disease)     H. pylori infection     Hypertension     Incisional hernia, without obstruction or gangrene 2022    Ovarian cyst        Past Surgical History:   Procedure Laterality Date    BILATERAL TUBAL LIGATION      BREAST BIOPSY  Left 2020    benign     SECTION      x 3    CHOLECYSTECTOMY  2022    COLONOSCOPY N/A 2022    Procedure: COLONOSCOPY;  Surgeon: Cirilo Tate MD;  Location: [facility] ENDO (4TH FLR);  Service: Endoscopy;  Laterality: N/A;  Constipation bowel prep    ESOPHAGOGASTRODUODENOSCOPY N/A 2022    Procedure: EGD (ESOPHAGOGASTRODUODENOSCOPY);  Surgeon: Cirilo Tate MD;  Location: [facility] ENDO (4TH FLR);  Service: Endoscopy;  Laterality: N/A;  COVID test at [place] on -GT    ESOPHAGOGASTRODUODENOSCOPY N/A 2022    Procedure: EGD (ESOPHAGOGASTRODUODENOSCOPY);  Surgeon: Cirilo Tate MD;  Location: [facility] ENDO (4TH FLR);  Service: Endoscopy;  Laterality: N/A;    HERNIA REPAIR  2022    LAPAROSCOPIC CHOLECYSTECTOMY N/A 2022    Procedure: CHOLECYSTECTOMY, LAPAROSCOPIC;  Surgeon: Chau Thomason Jr., MD;  Location: [facility] OR 2ND FLR;  Service: General;  Laterality: N/A;    LAPAROSCOPY LYSIS OF ADHESIONS  2018    Myomectomy    ROBOT-ASSISTED REPAIR OF INCISIONAL HERNIA USING DA LOUIE XI N/A 2022    Procedure: XI ROBOTIC REPAIR, HERNIA, INCISIONAL w/ mesh;  Surgeon: Chau Thomason Jr., MD;  Location: [facility] OR 2ND FLR;  Service: General;  Laterality: N/A;    TUBAL LIGATION         Medications  Current Medications[1]     Social History[2]    Family History   Problem Relation Name Age of Onset    COPD Mother Karo Wiley     Coronary artery disease Mother Karo Wiley         s/p CABG    Breast cancer Sister  42    Hypertension Maternal Grandmother      Diabetes Maternal Grandmother      Colon cancer Neg Hx      Ovarian cancer Neg Hx      Celiac disease Neg Hx      Cirrhosis Neg Hx      Crohn's disease Neg Hx      Esophageal cancer Neg Hx      Inflammatory bowel disease Neg Hx      Liver cancer Neg Hx      Liver disease Neg Hx      Rectal cancer Neg Hx      Stomach cancer Neg Hx      Ulcerative colitis Neg Hx      Pancreatic cancer Neg Hx      Kidney cancer Neg Hx       Bladder Cancer Neg Hx      Uterine cancer Neg Hx         Review of Systems (at today's evaluation)  Review of Systems   Constitutional:  Negative for fever and unexpected weight change.   Respiratory:  Negative for cough and shortness of breath.    Cardiovascular:  Negative for chest pain and palpitations.   Gastrointestinal:  Negative for constipation, diarrhea, nausea and vomiting.   Genitourinary:  Negative for dysuria, frequency, hematuria and urgency.        Gyn as per HPI   Neurological:  Negative for headaches.        Objective:      Vitals:    03/06/25 1540   BP: 132/74     Physical Exam:   Constitutional: She appears well-developed and well-nourished. No distress.    HENT:   Head: Normocephalic.     Neck: No thyroid mass present.    Cardiovascular:  Normal rate.             Pulmonary/Chest: Effort normal. No respiratory distress.        Abdominal: Soft. There is no abdominal tenderness.     Genitourinary:    Inguinal canal, vagina, uterus, right adnexa and left adnexa normal.      Pelvic exam was performed with patient supine.   The external female genitalia was normal.   No external genitalia lesions identified,Cervix is normal. Right adnexum displays no mass and no tenderness. Left adnexum displays no mass and no tenderness. No tenderness or bleeding in the vagina. Uterus is not tender. Normal urethral meatus.Urethra findings: no tendernessBladder findings: no bladder tenderness   Genitourinary Comments: A chaperone (female medical assistant) was present throughout the pelvic exam.             Musculoskeletal: Normal range of motion.      Right lower leg: No edema.      Left lower leg: No edema.      Lymphadenopathy: No inguinal adenopathy noted on the right or left side.    Neurological: She is alert.    Skin: Skin is warm and dry.    Psychiatric: She has a normal mood and affect. Mood normal.         Assessment:        1. Abnormal uterine bleeding (AUB)    2. Pelvic pain    3. Screening for malignant  neoplasm of cervix        Plan:      Abnormal uterine bleeding (AUB)  -     US Pelvis Comp with Transvag NON-OB (xpd; Future; Expected date: 03/06/2025  -     Luteinizing Hormone; Future; Expected date: 03/06/2025  -     Follicle Stimulating Hormone; Future; Expected date: 03/06/2025  -     Estradiol; Future; Expected date: 03/06/2025  -     Ferritin; Future; Expected date: 03/06/2025  -     CBC Auto Differential; Future; Expected date: 03/06/2025    Pelvic pain  -     US Pelvis Comp with Transvag NON-OB (xpd; Future; Expected date: 03/06/2025    Screening for malignant neoplasm of cervix  -     HPV High Risk Genotypes, PCR  -     Liquid-Based Pap Smear, Screening       Follow up for ASAP after recommended testing obtained, F/U on today's evaluation.     The above was reviewed discussed with the patient.  We discussed her previous history, improvement following oral progesterone and recurrence of her pain and bleeding issues over the last few months.    At this time will proceed as follows:   In light of the patient's history of abnormal Pap smear Pap smear was repeated today.    Pelvic ultrasound has been ordered   Hormonal lab work as above as well as CBC has been ordered.    We will base further evaluation treatment results of the above testing    The patient's questions were answered, and she is in agreement with the current plan.     Ubaldo Gonzalez MD  Department OBGYN [facility]         [1]   Current Outpatient Medications:     albuterol (PROVENTIL) 5 mg/mL nebulizer solution, Take 0.5 mLs (2.5 mg total) by nebulization every 6 (six) hours as needed for Shortness of Breath., Disp: 60 each, Rfl: 0    albuterol (VENTOLIN HFA) 90 mcg/actuation inhaler, Inhale 2 puffs into the lungs every 6 (six) hours as needed for Wheezing. Rescue, Disp: 18 g, Rfl: 2    amLODIPine (NORVASC) 5 MG tablet, Take 1 tablet (5 mg total) by mouth once daily., Disp: 90 tablet, Rfl: 2    ciclopirox (PENLAC) 8 % Soln, Apply topically  nightly. Apply to affected  toenail(s) and adjacent skin once daily in combination with weekly nail trimming and periodic nail debridement. Remove with alcohol every 7 days; continue therapy until nail clearance (max duration 48wks), Disp: 6.6 mL, Rfl: 2    clotrimazole-betamethasone 1-0.05% (LOTRISONE) cream, Apply topically 2 (two) times daily. PRN itch, Disp: 45 g, Rfl: 1    fluticasone propionate (FLONASE) 50 mcg/actuation nasal spray, 2 sprays (100 mcg total) by Each Nostril route once daily., Disp: 18.2 mL, Rfl: 8    pantoprazole (PROTONIX) 40 MG tablet, Take 1 tablet (40 mg total) by mouth before breakfast., Disp: 90 tablet, Rfl: 2    spironolactone (ALDACTONE) 25 MG tablet, Take 1 tablet (25 mg total) by mouth once daily., Disp: 90 tablet, Rfl: 2    tiZANidine (ZANAFLEX) 4 MG tablet, TAKE 1 TABLET BY MOUTH EVERY DAY AT BEDTIME AS NEEDED FOR BACK PAIN, Disp: 30 tablet, Rfl: 0    tretinoin (RETIN-A) 0.05 % cream, Apply topically every evening., Disp: 45 g, Rfl: 5    valACYclovir (VALTREX) 500 MG tablet, TAKE 1 TABLET BY MOUTH TWICE DAILY FOR 3 DAYS AS NEEDED FOR  FLARE, Disp: 60 tablet, Rfl: 0  [2]   Social History  Tobacco Use    Smoking status: Never     Passive exposure: Never    Smokeless tobacco: Never   Substance Use Topics    Alcohol use: Not Currently     Comment: Special occasions    Drug use: Never

## 2025-03-10 ENCOUNTER — RESULTS FOLLOW-UP (OUTPATIENT)
Dept: OBSTETRICS AND GYNECOLOGY | Facility: CLINIC | Age: 50
End: 2025-03-10

## 2025-03-12 ENCOUNTER — LAB VISIT (OUTPATIENT)
Dept: LAB | Facility: HOSPITAL | Age: 50
End: 2025-03-12
Attending: INTERNAL MEDICINE
Payer: COMMERCIAL

## 2025-03-12 DIAGNOSIS — E53.8 VITAMIN B12 DEFICIENCY: ICD-10-CM

## 2025-03-12 DIAGNOSIS — Z00.00 NORMAL PHYSICAL EXAM, ROUTINE: ICD-10-CM

## 2025-03-12 DIAGNOSIS — N93.9 ABNORMAL UTERINE BLEEDING (AUB): ICD-10-CM

## 2025-03-12 DIAGNOSIS — Z86.19 HISTORY OF HELICOBACTER PYLORI INFECTION: ICD-10-CM

## 2025-03-12 LAB
ALBUMIN SERPL BCP-MCNC: 4.3 G/DL (ref 3.5–5.2)
ALP SERPL-CCNC: 66 U/L (ref 40–150)
ALT SERPL W/O P-5'-P-CCNC: 15 U/L (ref 10–44)
ANION GAP SERPL CALC-SCNC: 7 MMOL/L (ref 8–16)
AST SERPL-CCNC: 18 U/L (ref 10–40)
BASOPHILS # BLD AUTO: 0.06 K/UL (ref 0–0.2)
BASOPHILS # BLD AUTO: 0.06 K/UL (ref 0–0.2)
BASOPHILS NFR BLD: 1.4 % (ref 0–1.9)
BASOPHILS NFR BLD: 1.4 % (ref 0–1.9)
BILIRUB SERPL-MCNC: 0.9 MG/DL (ref 0.1–1)
BUN SERPL-MCNC: 11 MG/DL (ref 6–20)
CALCIUM SERPL-MCNC: 9.4 MG/DL (ref 8.7–10.5)
CHLORIDE SERPL-SCNC: 106 MMOL/L (ref 95–110)
CO2 SERPL-SCNC: 25 MMOL/L (ref 23–29)
CREAT SERPL-MCNC: 1 MG/DL (ref 0.5–1.4)
DIFFERENTIAL METHOD BLD: ABNORMAL
DIFFERENTIAL METHOD BLD: ABNORMAL
EOSINOPHIL # BLD AUTO: 0.1 K/UL (ref 0–0.5)
EOSINOPHIL # BLD AUTO: 0.1 K/UL (ref 0–0.5)
EOSINOPHIL NFR BLD: 1.1 % (ref 0–8)
EOSINOPHIL NFR BLD: 1.1 % (ref 0–8)
ERYTHROCYTE [DISTWIDTH] IN BLOOD BY AUTOMATED COUNT: 13.3 % (ref 11.5–14.5)
ERYTHROCYTE [DISTWIDTH] IN BLOOD BY AUTOMATED COUNT: 13.3 % (ref 11.5–14.5)
EST. GFR  (NO RACE VARIABLE): >60 ML/MIN/1.73 M^2
ESTIMATED AVG GLUCOSE: 103 MG/DL (ref 68–131)
ESTRADIOL SERPL-MCNC: 44 PG/ML
FERRITIN SERPL-MCNC: 113 NG/ML (ref 20–300)
FSH SERPL-ACNC: 12.96 MIU/ML
GLUCOSE SERPL-MCNC: 87 MG/DL (ref 70–110)
HBA1C MFR BLD: 5.2 % (ref 4–5.6)
HCT VFR BLD AUTO: 42.4 % (ref 37–48.5)
HCT VFR BLD AUTO: 42.4 % (ref 37–48.5)
HGB BLD-MCNC: 13.3 G/DL (ref 12–16)
HGB BLD-MCNC: 13.3 G/DL (ref 12–16)
IMM GRANULOCYTES # BLD AUTO: 0.01 K/UL (ref 0–0.04)
IMM GRANULOCYTES # BLD AUTO: 0.01 K/UL (ref 0–0.04)
IMM GRANULOCYTES NFR BLD AUTO: 0.2 % (ref 0–0.5)
IMM GRANULOCYTES NFR BLD AUTO: 0.2 % (ref 0–0.5)
LH SERPL-ACNC: 5 MIU/ML
LYMPHOCYTES # BLD AUTO: 1.5 K/UL (ref 1–4.8)
LYMPHOCYTES # BLD AUTO: 1.5 K/UL (ref 1–4.8)
LYMPHOCYTES NFR BLD: 34.2 % (ref 18–48)
LYMPHOCYTES NFR BLD: 34.2 % (ref 18–48)
MCH RBC QN AUTO: 28.5 PG (ref 27–31)
MCH RBC QN AUTO: 28.5 PG (ref 27–31)
MCHC RBC AUTO-ENTMCNC: 31.4 G/DL (ref 32–36)
MCHC RBC AUTO-ENTMCNC: 31.4 G/DL (ref 32–36)
MCV RBC AUTO: 91 FL (ref 82–98)
MCV RBC AUTO: 91 FL (ref 82–98)
MONOCYTES # BLD AUTO: 0.4 K/UL (ref 0.3–1)
MONOCYTES # BLD AUTO: 0.4 K/UL (ref 0.3–1)
MONOCYTES NFR BLD: 8.7 % (ref 4–15)
MONOCYTES NFR BLD: 8.7 % (ref 4–15)
NEUTROPHILS # BLD AUTO: 2.4 K/UL (ref 1.8–7.7)
NEUTROPHILS # BLD AUTO: 2.4 K/UL (ref 1.8–7.7)
NEUTROPHILS NFR BLD: 54.4 % (ref 38–73)
NEUTROPHILS NFR BLD: 54.4 % (ref 38–73)
NRBC BLD-RTO: 0 /100 WBC
NRBC BLD-RTO: 0 /100 WBC
PLATELET # BLD AUTO: 283 K/UL (ref 150–450)
PLATELET # BLD AUTO: 283 K/UL (ref 150–450)
PMV BLD AUTO: 11.7 FL (ref 9.2–12.9)
PMV BLD AUTO: 11.7 FL (ref 9.2–12.9)
POTASSIUM SERPL-SCNC: 3.9 MMOL/L (ref 3.5–5.1)
PROT SERPL-MCNC: 8.3 G/DL (ref 6–8.4)
RBC # BLD AUTO: 4.66 M/UL (ref 4–5.4)
RBC # BLD AUTO: 4.66 M/UL (ref 4–5.4)
SODIUM SERPL-SCNC: 138 MMOL/L (ref 136–145)
TSH SERPL DL<=0.005 MIU/L-ACNC: 0.99 UIU/ML (ref 0.4–4)
VIT B12 SERPL-MCNC: 482 PG/ML (ref 210–950)
WBC # BLD AUTO: 4.36 K/UL (ref 3.9–12.7)
WBC # BLD AUTO: 4.36 K/UL (ref 3.9–12.7)

## 2025-03-12 PROCEDURE — 36415 COLL VENOUS BLD VENIPUNCTURE: CPT | Mod: PO | Performed by: INTERNAL MEDICINE

## 2025-03-12 PROCEDURE — 84443 ASSAY THYROID STIM HORMONE: CPT | Performed by: INTERNAL MEDICINE

## 2025-03-12 PROCEDURE — 82670 ASSAY OF TOTAL ESTRADIOL: CPT | Performed by: OBSTETRICS & GYNECOLOGY

## 2025-03-12 PROCEDURE — 83001 ASSAY OF GONADOTROPIN (FSH): CPT | Performed by: OBSTETRICS & GYNECOLOGY

## 2025-03-12 PROCEDURE — 82728 ASSAY OF FERRITIN: CPT | Performed by: OBSTETRICS & GYNECOLOGY

## 2025-03-12 PROCEDURE — 82607 VITAMIN B-12: CPT | Performed by: INTERNAL MEDICINE

## 2025-03-12 PROCEDURE — 83002 ASSAY OF GONADOTROPIN (LH): CPT | Performed by: OBSTETRICS & GYNECOLOGY

## 2025-03-12 PROCEDURE — 83036 HEMOGLOBIN GLYCOSYLATED A1C: CPT | Performed by: INTERNAL MEDICINE

## 2025-03-12 PROCEDURE — 85025 COMPLETE CBC W/AUTO DIFF WBC: CPT | Performed by: INTERNAL MEDICINE

## 2025-03-12 PROCEDURE — 80053 COMPREHEN METABOLIC PANEL: CPT | Performed by: INTERNAL MEDICINE

## 2025-03-13 DIAGNOSIS — R10.13 DYSPEPSIA: Primary | ICD-10-CM

## 2025-03-14 ENCOUNTER — RESULTS FOLLOW-UP (OUTPATIENT)
Dept: PRIMARY CARE CLINIC | Facility: CLINIC | Age: 50
End: 2025-03-14

## 2025-03-14 NOTE — PROGRESS NOTES
I sent pt a my chart message -  I reviewed your  labs.  Your Ha1c was normal at 5.2.  Your Vitamin B12 was borderline low.  Have you been taking your Vitamin B12 1000 mcg daily?  Do you miss doses often? Your thyroid functions are normal.  Your Cholesterol looked good.  Your kidney function and liver functions looked good.  You are not anemic. No further recommendations at this time.  Dr. CONTRERAS

## 2025-03-16 ENCOUNTER — HOSPITAL ENCOUNTER (EMERGENCY)
Facility: HOSPITAL | Age: 50
Discharge: HOME | End: 2025-03-16
Attending: EMERGENCY MEDICINE
Payer: COMMERCIAL

## 2025-03-16 VITALS
TEMPERATURE: 98 F | HEIGHT: 67 IN | OXYGEN SATURATION: 99 % | DIASTOLIC BLOOD PRESSURE: 82 MMHG | WEIGHT: 197 LBS | SYSTOLIC BLOOD PRESSURE: 148 MMHG | RESPIRATION RATE: 16 BRPM | BODY MASS INDEX: 30.92 KG/M2 | HEART RATE: 75 BPM

## 2025-03-16 DIAGNOSIS — H92.11 OTORRHEA OF RIGHT EAR: ICD-10-CM

## 2025-03-16 DIAGNOSIS — H61.891 IRRITATION OF RIGHT EXTERNAL AUDITORY CANAL: ICD-10-CM

## 2025-03-16 DIAGNOSIS — H92.01 RIGHT EAR PAIN: Primary | ICD-10-CM

## 2025-03-16 LAB
GROUP A STREP, MOLECULAR: NEGATIVE
INFLUENZA A, MOLECULAR: NEGATIVE
INFLUENZA B, MOLECULAR: NEGATIVE
SARS-COV-2 RDRP RESP QL NAA+PROBE: NEGATIVE
SPECIMEN SOURCE: NORMAL

## 2025-03-16 PROCEDURE — 87651 STREP A DNA AMP PROBE: CPT

## 2025-03-16 PROCEDURE — 86803 HEPATITIS C AB TEST: CPT | Performed by: EMERGENCY MEDICINE

## 2025-03-16 PROCEDURE — 87389 HIV-1 AG W/HIV-1&-2 AB AG IA: CPT | Performed by: EMERGENCY MEDICINE

## 2025-03-16 PROCEDURE — 87502 INFLUENZA DNA AMP PROBE: CPT

## 2025-03-16 PROCEDURE — 25000003 PHARM REV CODE 250

## 2025-03-16 PROCEDURE — 36415 COLL VENOUS BLD VENIPUNCTURE: CPT | Performed by: EMERGENCY MEDICINE

## 2025-03-16 PROCEDURE — 87635 SARS-COV-2 COVID-19 AMP PRB: CPT

## 2025-03-16 PROCEDURE — 99283 EMERGENCY DEPT VISIT LOW MDM: CPT

## 2025-03-16 RX ORDER — ACETAMINOPHEN 500 MG
1000 TABLET ORAL
Status: COMPLETED | OUTPATIENT
Start: 2025-03-16 | End: 2025-03-16

## 2025-03-16 RX ADMIN — ACETAMINOPHEN 1000 MG: 500 TABLET ORAL at 06:03

## 2025-03-16 NOTE — ED PROVIDER NOTES
"Encounter Date: 3/16/2025       History     Chief Complaint   Patient presents with    Otalgia     Reports pain today to right ear after using qtip to clean ear yesterday. Reports "feeling moisture' in her ear yesterday and when used qtip noticed blood with a clot      Patient is a 49 y.o. female with past medical history of hypertension, asthma, and GERD who presents to ED via self for concern for ear pain which began 1 day(s) ago.  Patient reports last night she developed pain in her right ear.  Patient reports yesterday during the day she felt like she had something wet in her ear when she checked with a Q-tip that has blood.  Patient reports she is still had blood today.  Patient denies a sharp pain while putting a Q-tip in her ear.  Patient denies fever.  Patient reports a sore throat and congestion that started last night and a cough that started today.  Patient denies chest pain or shortness of breath.  Patient is awake and alert in no acute distress.         Review of patient's allergies indicates:  No Known Allergies  Past Medical History:   Diagnosis Date    Asthma     GERD (gastroesophageal reflux disease)     H. pylori infection     Hypertension     Incisional hernia, without obstruction or gangrene 2022    Ovarian cyst      Past Surgical History:   Procedure Laterality Date    BILATERAL TUBAL LIGATION      BREAST BIOPSY Left     benign     SECTION      x 3    CHOLECYSTECTOMY  2022    COLONOSCOPY N/A 2022    Procedure: COLONOSCOPY;  Surgeon: Cirilo Tate MD;  Location: [address]);  Service: Endoscopy;  Laterality: N/A;  Constipation bowel prep    ESOPHAGOGASTRODUODENOSCOPY N/A 2022    Procedure: EGD (ESOPHAGOGASTRODUODENOSCOPY);  Surgeon: Cirilo Tate MD;  Location: [address]);  Service: Endoscopy;  Laterality: N/A;  COVID test at [place] on -GT    ESOPHAGOGASTRODUODENOSCOPY N/A 2022    Procedure: EGD (ESOPHAGOGASTRODUODENOSCOPY);  " Surgeon: Cirilo Tate MD;  Location: [facility] ENDO (4TH FLR);  Service: Endoscopy;  Laterality: N/A;    HERNIA REPAIR  9/22/2022    LAPAROSCOPIC CHOLECYSTECTOMY N/A 02/07/2022    Procedure: CHOLECYSTECTOMY, LAPAROSCOPIC;  Surgeon: Chau Thomason Jr., MD;  Location: [facility] OR 2ND FLR;  Service: General;  Laterality: N/A;    LAPAROSCOPY LYSIS OF ADHESIONS  11/28/2018    Myomectomy    ROBOT-ASSISTED REPAIR OF INCISIONAL HERNIA USING DA LOUIE XI N/A 09/22/2022    Procedure: XI ROBOTIC REPAIR, HERNIA, INCISIONAL w/ mesh;  Surgeon: Chau Thomason Jr., MD;  Location: [facility] OR 2ND FLR;  Service: General;  Laterality: N/A;    TUBAL LIGATION       Family History   Problem Relation Name Age of Onset    COPD Mother Karopadmini Wiley     Coronary artery disease Mother Karopadmini Wiley         s/p CABG    Breast cancer Sister  42    Hypertension Maternal Grandmother      Diabetes Maternal Grandmother      Colon cancer Neg Hx      Ovarian cancer Neg Hx      Celiac disease Neg Hx      Cirrhosis Neg Hx      Crohn's disease Neg Hx      Esophageal cancer Neg Hx      Inflammatory bowel disease Neg Hx      Liver cancer Neg Hx      Liver disease Neg Hx      Rectal cancer Neg Hx      Stomach cancer Neg Hx      Ulcerative colitis Neg Hx      Pancreatic cancer Neg Hx      Kidney cancer Neg Hx      Bladder Cancer Neg Hx      Uterine cancer Neg Hx       Social History[1]  Review of Systems   Constitutional:  Negative for fever.   HENT:  Positive for congestion, ear discharge, ear pain and sore throat. Negative for drooling, trouble swallowing and voice change.    Respiratory:  Positive for cough. Negative for apnea, choking, chest tightness, shortness of breath, wheezing and stridor.    Cardiovascular: Negative.  Negative for chest pain.   Gastrointestinal:  Negative for nausea and vomiting.   Musculoskeletal:  Negative for back pain, neck pain and neck stiffness.   Skin:  Negative for color change, pallor and rash.   Neurological:   Negative for weakness.   Hematological:  Does not bruise/bleed easily.       Physical Exam     Initial Vitals [03/16/25 1717]   BP Pulse Resp Temp SpO2   (!) 150/91 79 18 98.2 °F (36.8 °C) 99 %      MAP       --         Physical Exam    Nursing note and vitals reviewed.  Constitutional: She appears well-developed and well-nourished. She is not diaphoretic. No distress.   HENT:   Head: Normocephalic and atraumatic.   Right Ear: External ear normal. There is drainage. No swelling or tenderness. No mastoid tenderness. Tympanic membrane is not perforated, not erythematous, not retracted and not bulging. No hemotympanum.   Left Ear: Tympanic membrane, external ear and ear canal normal.   Nose: Nose normal. Mouth/Throat: Uvula is midline and mucous membranes are normal. Posterior oropharyngeal erythema present. No oropharyngeal exudate, posterior oropharyngeal edema or tonsillar abscesses.   Dried blood noted within the right ear canal.  TM appears to be intact   Eyes: EOM are normal.   Neck:   Normal range of motion.  Cardiovascular:  Normal rate, regular rhythm, normal heart sounds and intact distal pulses.     Exam reveals no gallop and no friction rub.       No murmur heard.  Pulmonary/Chest: Breath sounds normal. No respiratory distress. She has no wheezes. She has no rhonchi. She has no rales. She exhibits no tenderness.   Musculoskeletal:      Cervical back: Normal range of motion.     Neurological: She is alert and oriented to person, place, and time. She has normal strength. GCS score is 15. GCS eye subscore is 4. GCS verbal subscore is 5. GCS motor subscore is 6.   Skin: Skin is warm and dry. Capillary refill takes less than 2 seconds.   Psychiatric: She has a normal mood and affect. Her behavior is normal. Judgment and thought content normal.         ED Course   Procedures  Labs Reviewed   GROUP A STREP, MOLECULAR       Result Value    Group A Strep, Molecular Negative     INFLUENZA A AND B ANTIGEN    Influenza  A, Molecular Negative      Influenza B, Molecular Negative      Flu A & B Source Nasal swab      Narrative:     Specimen Source->Nasopharyngeal Swab   SARS-COV-2 RNA AMPLIFICATION, QUAL    SARS-CoV-2 RNA, Amplification, Qual Negative     HEPATITIS C ANTIBODY   HIV 1 / 2 ANTIBODY          Imaging Results    None          Medications   acetaminophen tablet 1,000 mg (1,000 mg Oral Given 3/16/25 9230)     Medical Decision Making  MDM    Patient presents for emergent evaluation of acute ear pain that poses a possible threat to life and/or bodily function.    Differential diagnosis included but not limited to otitis media, otitis externa, perforated TM, mastoiditis.  In the ED patient found to have acute clear lung sounds bilaterally with no increased work of breathing.  Patient has mild appearing posterior pharynx erythema without pustular exudate.  No peritonsillar abscess visualized.  Patient has normal range of motion of neck without stiffness.  Left TM and ear canal within normal limits.  Right ear canal exhibits bloody drainage that appears to be coming from a possible ear canal wound/scratch.  Right TM appears dull without erythema or bulging and there does not appear to be a perforated TM.  Blood does not appear to be coming from the TM but more from the canal in the right ear.  No mastoid tenderness bilaterally.  Patient maintaining secretions normally in no acute distress.    Labs significant for negative flu, COVID, strep    Discharge MDM  I discussed the patient presentation labs, findings with ED attending Dr. Alfaro.    There does not appear to be an otitis media or otitis externa on visual exam.  Discussed with the patient close follow up with primary care and ENT and to avoid putting anything in her ears and did not get water in the ears until she follows up.  Patient verbalizes understanding.  No indication for antibiotics at this time.  I do not appreciate any acute emergent process on workup at this  time.  Patient was managed in the ED with oral Tylenol  The response to treatment was good.    Patient was discharged in stable condition with close follow up with ENT.  Detailed return precautions discussed to return to the ED for any new or worsening concerns.  Patient verbalizes understanding.    NP uses Epic and voice recognition software prone to occasional and minor errors that may persist in the medical record.     Amount and/or Complexity of Data Reviewed  Labs:  Decision-making details documented in ED Course.    Risk  OTC drugs.               ED Course as of 03/16/25 2325   Sun Mar 16, 2025   1918 SARS-CoV-2 RNA, Amplification, Qual: Negative [MP]   1918 Group A Strep, Molecular: Negative [MP]   1918 Influenza A, Molecular: Negative [MP]   1918 Influenza B, Molecular: Negative [MP]      ED Course User Index  [MP] Christen Carter NP                           Clinical Impression:  Final diagnoses:  [H92.01] Right ear pain (Primary)  [H61.891] Irritation of right external auditory canal  [H92.11] Otorrhea of right ear          ED Disposition Condition    Discharge Stable          ED Prescriptions    None       Follow-up Information       Follow up With Specialties Details Why Contact Info Additional Information    Uma Henry MD Internal Medicine   [address] [place] [address]  [phone]       Jarrod Titus MD Otolaryngology Schedule an appointment as soon as possible for a visit   [address] [facility] [address]  [phone]       Jovani Treviño MD Otolaryngology Schedule an appointment as soon as possible for a visit  For recheck/continuing care [address] [facility]  [facility] [address]  [phone]       [facility] - Emergency Dept Emergency Medicine   [address] [facility] [id number] [phone] 1st floor               [1]   Social History  Tobacco Use    Smoking status: Never      Passive exposure: Never    Smokeless tobacco: Never   Substance Use Topics    Alcohol use: Not Currently     Comment: Special occasions    Drug use: Never        Christen Carter NP  03/16/25 1653

## 2025-03-16 NOTE — ED NOTES
Bed: [facility] 01 - C Chair  Expected date:   Expected time:   Means of arrival:   Comments:

## 2025-03-16 NOTE — ED NOTES
Bed: [facility] 02 - B Chair  Expected date:   Expected time:   Means of arrival:   Comments:

## 2025-03-16 NOTE — ED NOTES
Bed: [facility] 02 - B Chair  Expected date:   Expected time:   Means of arrival:   Comments:

## 2025-03-16 NOTE — Clinical Note
"Ilene Arcos" Shirlene was seen and treated in our emergency department on 3/16/2025.  She may return to work on 03/18/2025.       If you have any questions or concerns, please don't hesitate to call.      Christen Carter NP"

## 2025-03-17 ENCOUNTER — PATIENT MESSAGE (OUTPATIENT)
Dept: ADMINISTRATIVE | Facility: CLINIC | Age: 50
End: 2025-03-17
Payer: COMMERCIAL

## 2025-03-17 LAB
HCV AB SERPL QL IA: NEGATIVE
HIV 1+2 AB+HIV1 P24 AG SERPL QL IA: NEGATIVE

## 2025-03-17 NOTE — DISCHARGE INSTRUCTIONS
Alternate Tylenol and ibuprofen as needed for pain.  Do not allow anything to get in your ear, no water or Q-tips.  Please follow up with the regular doctor and/or ENT as soon as possible for further evaluation and rechecked.    Please return to the ED for worsening pain, worsening drainage from the ear, fever, decreased hearing, or any new or worsening concerns.

## 2025-03-18 ENCOUNTER — PATIENT MESSAGE (OUTPATIENT)
Dept: GASTROENTEROLOGY | Facility: CLINIC | Age: 50
End: 2025-03-18
Payer: COMMERCIAL

## 2025-04-05 DIAGNOSIS — A60.00 GENITAL HERPES SIMPLEX, UNSPECIFIED SITE: ICD-10-CM

## 2025-04-05 NOTE — TELEPHONE ENCOUNTER
No care due was identified.  [facility] Embedded Care Due Messages. Reference number: [id number].   4/05/2025 10:00:45 AM CDT

## 2025-04-07 RX ORDER — VALACYCLOVIR HYDROCHLORIDE 500 MG/1
TABLET, FILM COATED ORAL
Qty: 60 TABLET | Refills: 0 | Status: SHIPPED | OUTPATIENT
Start: 2025-04-07

## (undated) DEVICE — SEE MEDLINE ITEM 157117

## (undated) DEVICE — ADHESIVE MASTISOL VIAL 48/BX

## (undated) DEVICE — TUBING HF INSUFFLATION W/ FLTR

## (undated) DEVICE — SYS SMOKE EVACUATION LAP

## (undated) DEVICE — DRAPE COLUMN DAVINCI XI

## (undated) DEVICE — ELECTRODE REM PLYHSV RETURN 9

## (undated) DEVICE — SCISSOR 5MMX35CM DIRECT DRIVE

## (undated) DEVICE — CLOSURE SKIN STERI STRIP 1/2X4

## (undated) DEVICE — DRAPE ARM DAVINCI XI

## (undated) DEVICE — SUT VLOC 2 0 BI 12 V12

## (undated) DEVICE — NDL HYPO REG 25G X 1 1/2

## (undated) DEVICE — SUT V-LOC 180 ABD 2/0 GS-21

## (undated) DEVICE — DRAPE STERI INSTRUMENT 1018

## (undated) DEVICE — DRAPE SCOPE PILLOW WARMER

## (undated) DEVICE — SUT 0 VICRYL / UR6 (J603)

## (undated) DEVICE — GOWN SURGICAL X-LARGE

## (undated) DEVICE — COVER LIGHT HANDLE

## (undated) DEVICE — CANNULA ENDOPATH XCEL 5X100MM

## (undated) DEVICE — TRAY MINOR GEN SURG

## (undated) DEVICE — TROCAR ENDOPATH XCEL 5X100MM

## (undated) DEVICE — GOWN POLY REINF BRTH SLV XL

## (undated) DEVICE — TOWEL OR DISP STRL BLUE 4/PK

## (undated) DEVICE — DRAPE ABDOMINAL TIBURON 14X11

## (undated) DEVICE — SEAL UNIVERSAL 5MM-8MM XI

## (undated) DEVICE — TROCAR ENDOPATH XCEL 12MM 10CM

## (undated) DEVICE — BLADE SURG CARBON STEEL SZ11

## (undated) DEVICE — OBTURATOR BLADELESS 8MM XI CLR

## (undated) DEVICE — CLIP HEMO-LOK ML

## (undated) DEVICE — SUT MCRYL PLUS 4-0 PS2 27IN

## (undated) DEVICE — COVER TIP CURVED SCISSORS XI